# Patient Record
Sex: FEMALE | Race: OTHER | HISPANIC OR LATINO
[De-identification: names, ages, dates, MRNs, and addresses within clinical notes are randomized per-mention and may not be internally consistent; named-entity substitution may affect disease eponyms.]

---

## 2017-01-06 ENCOUNTER — APPOINTMENT (OUTPATIENT)
Age: 67
End: 2017-01-06

## 2017-01-24 ENCOUNTER — RESULT CHARGE (OUTPATIENT)
Age: 67
End: 2017-01-24

## 2017-01-25 ENCOUNTER — APPOINTMENT (OUTPATIENT)
Dept: FAMILY MEDICINE | Facility: HOSPITAL | Age: 67
End: 2017-01-25

## 2017-01-25 ENCOUNTER — OUTPATIENT (OUTPATIENT)
Dept: OUTPATIENT SERVICES | Facility: HOSPITAL | Age: 67
LOS: 1 days | End: 2017-01-25
Payer: MEDICAID

## 2017-01-25 DIAGNOSIS — R04.2 HEMOPTYSIS: ICD-10-CM

## 2017-01-25 DIAGNOSIS — Z98.51 TUBAL LIGATION STATUS: Chronic | ICD-10-CM

## 2017-01-25 PROCEDURE — G0463: CPT

## 2017-01-25 PROCEDURE — 93005 ELECTROCARDIOGRAM TRACING: CPT

## 2017-01-31 ENCOUNTER — OUTPATIENT (OUTPATIENT)
Dept: OUTPATIENT SERVICES | Facility: HOSPITAL | Age: 67
LOS: 1 days | End: 2017-01-31
Payer: MEDICAID

## 2017-01-31 DIAGNOSIS — Z98.51 TUBAL LIGATION STATUS: Chronic | ICD-10-CM

## 2017-01-31 PROCEDURE — 80053 COMPREHEN METABOLIC PANEL: CPT

## 2017-01-31 PROCEDURE — 83036 HEMOGLOBIN GLYCOSYLATED A1C: CPT

## 2017-01-31 PROCEDURE — 80061 LIPID PANEL: CPT

## 2017-01-31 PROCEDURE — 93306 TTE W/DOPPLER COMPLETE: CPT

## 2017-01-31 PROCEDURE — 85025 COMPLETE CBC W/AUTO DIFF WBC: CPT

## 2017-01-31 PROCEDURE — 85730 THROMBOPLASTIN TIME PARTIAL: CPT

## 2017-02-06 ENCOUNTER — APPOINTMENT (OUTPATIENT)
Dept: OPHTHALMOLOGY | Facility: CLINIC | Age: 67
End: 2017-02-06

## 2017-02-07 LAB — HBA1C MFR BLD HPLC: 5.9

## 2017-02-09 ENCOUNTER — APPOINTMENT (OUTPATIENT)
Dept: OPHTHALMOLOGY | Facility: CLINIC | Age: 67
End: 2017-02-09

## 2017-03-06 ENCOUNTER — APPOINTMENT (OUTPATIENT)
Dept: OPHTHALMOLOGY | Facility: CLINIC | Age: 67
End: 2017-03-06

## 2017-03-16 ENCOUNTER — FORM ENCOUNTER (OUTPATIENT)
Age: 67
End: 2017-03-16

## 2017-03-16 ENCOUNTER — APPOINTMENT (OUTPATIENT)
Dept: FAMILY MEDICINE | Facility: HOSPITAL | Age: 67
End: 2017-03-16

## 2017-03-16 ENCOUNTER — OUTPATIENT (OUTPATIENT)
Dept: OUTPATIENT SERVICES | Facility: HOSPITAL | Age: 67
LOS: 1 days | End: 2017-03-16
Payer: MEDICAID

## 2017-03-16 VITALS
SYSTOLIC BLOOD PRESSURE: 141 MMHG | HEIGHT: 55 IN | BODY MASS INDEX: 33.09 KG/M2 | HEART RATE: 63 BPM | RESPIRATION RATE: 16 BRPM | TEMPERATURE: 97.5 F | OXYGEN SATURATION: 99 % | WEIGHT: 143 LBS | DIASTOLIC BLOOD PRESSURE: 92 MMHG

## 2017-03-16 DIAGNOSIS — Z87.898 PERSONAL HISTORY OF OTHER SPECIFIED CONDITIONS: ICD-10-CM

## 2017-03-16 DIAGNOSIS — H57.9 UNSPECIFIED DISORDER OF EYE AND ADNEXA: ICD-10-CM

## 2017-03-16 DIAGNOSIS — Z87.2 PERSONAL HISTORY OF DISEASES OF THE SKIN AND SUBCUTANEOUS TISSUE: ICD-10-CM

## 2017-03-16 DIAGNOSIS — R73.03 PREDIABETES.: ICD-10-CM

## 2017-03-16 DIAGNOSIS — Z23 ENCOUNTER FOR IMMUNIZATION: ICD-10-CM

## 2017-03-16 DIAGNOSIS — Z86.79 PERSONAL HISTORY OF OTHER DISEASES OF THE CIRCULATORY SYSTEM: ICD-10-CM

## 2017-03-16 DIAGNOSIS — R05 COUGH: ICD-10-CM

## 2017-03-16 DIAGNOSIS — Z98.51 TUBAL LIGATION STATUS: Chronic | ICD-10-CM

## 2017-03-16 DIAGNOSIS — H26.9 UNSPECIFIED CATARACT: ICD-10-CM

## 2017-03-16 DIAGNOSIS — R35.8 XXOTHER POLYURIA: ICD-10-CM

## 2017-03-16 DIAGNOSIS — M79.1 MYALGIA: ICD-10-CM

## 2017-03-16 DIAGNOSIS — K21.9 GASTRO-ESOPHAGEAL REFLUX DISEASE W/OUT ESOPHAGITIS: ICD-10-CM

## 2017-03-17 PROCEDURE — 93880 EXTRACRANIAL BILAT STUDY: CPT

## 2017-03-17 PROCEDURE — G0463: CPT

## 2017-03-21 ENCOUNTER — APPOINTMENT (OUTPATIENT)
Dept: GASTROENTEROLOGY | Facility: HOSPITAL | Age: 67
End: 2017-03-21

## 2017-03-21 ENCOUNTER — APPOINTMENT (OUTPATIENT)
Age: 67
End: 2017-03-21

## 2017-03-21 ENCOUNTER — OUTPATIENT (OUTPATIENT)
Dept: OUTPATIENT SERVICES | Facility: HOSPITAL | Age: 67
LOS: 1 days | End: 2017-03-21
Payer: MEDICAID

## 2017-03-21 VITALS
HEART RATE: 75 BPM | BODY MASS INDEX: 27.88 KG/M2 | SYSTOLIC BLOOD PRESSURE: 127 MMHG | WEIGHT: 142 LBS | DIASTOLIC BLOOD PRESSURE: 82 MMHG | HEIGHT: 60 IN | RESPIRATION RATE: 14 BRPM

## 2017-03-21 DIAGNOSIS — K29.70 GASTRITIS, UNSPECIFIED, W/OUT BLEEDING: ICD-10-CM

## 2017-03-21 DIAGNOSIS — K31.9 DISEASE OF STOMACH AND DUODENUM, UNSPECIFIED: ICD-10-CM

## 2017-03-21 DIAGNOSIS — Z98.51 TUBAL LIGATION STATUS: Chronic | ICD-10-CM

## 2017-03-21 DIAGNOSIS — K29.70 GASTRITIS, UNSPECIFIED, WITHOUT BLEEDING: ICD-10-CM

## 2017-03-21 PROCEDURE — G0463: CPT

## 2017-04-06 ENCOUNTER — APPOINTMENT (OUTPATIENT)
Dept: OPHTHALMOLOGY | Facility: CLINIC | Age: 67
End: 2017-04-06

## 2017-08-18 ENCOUNTER — APPOINTMENT (OUTPATIENT)
Dept: FAMILY MEDICINE | Facility: HOSPITAL | Age: 67
End: 2017-08-18

## 2017-08-18 ENCOUNTER — RESULT CHARGE (OUTPATIENT)
Age: 67
End: 2017-08-18

## 2017-08-18 ENCOUNTER — OUTPATIENT (OUTPATIENT)
Dept: OUTPATIENT SERVICES | Facility: HOSPITAL | Age: 67
LOS: 1 days | End: 2017-08-18
Payer: MEDICAID

## 2017-08-18 ENCOUNTER — RX RENEWAL (OUTPATIENT)
Age: 67
End: 2017-08-18

## 2017-08-18 VITALS
OXYGEN SATURATION: 98 % | TEMPERATURE: 98 F | HEIGHT: 60 IN | RESPIRATION RATE: 14 BRPM | HEART RATE: 81 BPM | DIASTOLIC BLOOD PRESSURE: 107 MMHG | SYSTOLIC BLOOD PRESSURE: 159 MMHG | BODY MASS INDEX: 29.25 KG/M2 | WEIGHT: 149 LBS

## 2017-08-18 DIAGNOSIS — Z98.51 TUBAL LIGATION STATUS: Chronic | ICD-10-CM

## 2017-08-18 LAB
BILIRUB UR QL STRIP: NEGATIVE
CLARITY UR: CLEAR
COLLECTION METHOD: NORMAL
GLUCOSE UR-MCNC: NEGATIVE
HCG UR QL: 0.2 EU/DL
HGB UR QL STRIP.AUTO: NORMAL
KETONES UR-MCNC: NEGATIVE
LEUKOCYTE ESTERASE UR QL STRIP: NORMAL
NITRITE UR QL STRIP: NEGATIVE
PH UR STRIP: 6.5
PROT UR STRIP-MCNC: NORMAL
SP GR UR STRIP: 1.01

## 2017-08-18 PROCEDURE — 87086 URINE CULTURE/COLONY COUNT: CPT

## 2017-08-18 PROCEDURE — G0463: CPT

## 2017-11-10 ENCOUNTER — MED ADMIN CHARGE (OUTPATIENT)
Age: 67
End: 2017-11-10

## 2017-11-10 ENCOUNTER — OUTPATIENT (OUTPATIENT)
Dept: OUTPATIENT SERVICES | Facility: HOSPITAL | Age: 67
LOS: 1 days | End: 2017-11-10
Payer: MEDICAID

## 2017-11-10 ENCOUNTER — APPOINTMENT (OUTPATIENT)
Dept: FAMILY MEDICINE | Facility: HOSPITAL | Age: 67
End: 2017-11-10

## 2017-11-10 VITALS
WEIGHT: 146 LBS | HEIGHT: 60 IN | HEART RATE: 72 BPM | DIASTOLIC BLOOD PRESSURE: 105 MMHG | RESPIRATION RATE: 14 BRPM | SYSTOLIC BLOOD PRESSURE: 149 MMHG | TEMPERATURE: 98.1 F | BODY MASS INDEX: 28.66 KG/M2

## 2017-11-10 VITALS — SYSTOLIC BLOOD PRESSURE: 140 MMHG | DIASTOLIC BLOOD PRESSURE: 94 MMHG

## 2017-11-10 DIAGNOSIS — Z98.51 TUBAL LIGATION STATUS: Chronic | ICD-10-CM

## 2017-11-10 DIAGNOSIS — I10 ESSENTIAL (PRIMARY) HYPERTENSION: ICD-10-CM

## 2017-11-10 DIAGNOSIS — H26.9 UNSPECIFIED CATARACT: ICD-10-CM

## 2017-11-10 DIAGNOSIS — Z87.898 PERSONAL HISTORY OF OTHER SPECIFIED CONDITIONS: ICD-10-CM

## 2017-11-10 DIAGNOSIS — M25.511 PAIN IN RIGHT SHOULDER: ICD-10-CM

## 2017-11-10 DIAGNOSIS — R10.2 PELVIC AND PERINEAL PAIN: ICD-10-CM

## 2017-11-10 DIAGNOSIS — H34.212 PARTIAL RETINAL ARTERY OCCLUSION, LEFT EYE: ICD-10-CM

## 2017-11-10 DIAGNOSIS — H35.039 HYPERTENSIVE RETINOPATHY, UNSPECIFIED EYE: ICD-10-CM

## 2017-11-10 DIAGNOSIS — M25.512 PAIN IN RIGHT SHOULDER: ICD-10-CM

## 2017-11-10 DIAGNOSIS — Z00.00 ENCOUNTER FOR GENERAL ADULT MEDICAL EXAMINATION W/OUT ABNORMAL FINDINGS: ICD-10-CM

## 2017-11-10 PROCEDURE — 83036 HEMOGLOBIN GLYCOSYLATED A1C: CPT

## 2017-11-10 PROCEDURE — G0463: CPT

## 2017-11-10 PROCEDURE — 82306 VITAMIN D 25 HYDROXY: CPT

## 2017-11-10 PROCEDURE — 36415 COLL VENOUS BLD VENIPUNCTURE: CPT

## 2017-11-10 PROCEDURE — 80053 COMPREHEN METABOLIC PANEL: CPT

## 2017-11-10 PROCEDURE — 80061 LIPID PANEL: CPT

## 2017-11-10 PROCEDURE — 85025 COMPLETE CBC W/AUTO DIFF WBC: CPT

## 2017-11-10 RX ORDER — LOSARTAN POTASSIUM 100 MG/1
100 TABLET, FILM COATED ORAL DAILY
Qty: 90 | Refills: 0 | Status: DISCONTINUED | COMMUNITY
Start: 2017-03-16 | End: 2017-11-10

## 2017-11-10 RX ORDER — SULFAMETHOXAZOLE AND TRIMETHOPRIM 800; 160 MG/1; MG/1
800-160 TABLET ORAL TWICE DAILY
Qty: 8 | Refills: 0 | Status: DISCONTINUED | COMMUNITY
Start: 2017-08-18 | End: 2017-11-10

## 2017-11-10 RX ORDER — NITROFURANTOIN MACROCRYSTALS 100 MG/1
100 CAPSULE ORAL
Qty: 10 | Refills: 0 | Status: DISCONTINUED | COMMUNITY
Start: 2017-08-18 | End: 2017-11-10

## 2017-11-17 LAB — HBA1C MFR BLD HPLC: 5.6

## 2017-12-12 ENCOUNTER — RX RENEWAL (OUTPATIENT)
Age: 67
End: 2017-12-12

## 2018-01-22 ENCOUNTER — RX RENEWAL (OUTPATIENT)
Age: 68
End: 2018-01-22

## 2018-02-05 ENCOUNTER — MEDICATION RENEWAL (OUTPATIENT)
Age: 68
End: 2018-02-05

## 2018-02-07 ENCOUNTER — RX RENEWAL (OUTPATIENT)
Age: 68
End: 2018-02-07

## 2018-02-09 ENCOUNTER — APPOINTMENT (OUTPATIENT)
Dept: FAMILY MEDICINE | Facility: HOSPITAL | Age: 68
End: 2018-02-09

## 2018-02-09 ENCOUNTER — OUTPATIENT (OUTPATIENT)
Dept: OUTPATIENT SERVICES | Facility: HOSPITAL | Age: 68
LOS: 1 days | End: 2018-02-09
Payer: MEDICAID

## 2018-02-09 VITALS
DIASTOLIC BLOOD PRESSURE: 92 MMHG | SYSTOLIC BLOOD PRESSURE: 161 MMHG | TEMPERATURE: 98 F | HEART RATE: 72 BPM | OXYGEN SATURATION: 98 % | BODY MASS INDEX: 29.3 KG/M2 | WEIGHT: 150 LBS | RESPIRATION RATE: 14 BRPM

## 2018-02-09 DIAGNOSIS — Z98.51 TUBAL LIGATION STATUS: Chronic | ICD-10-CM

## 2018-02-09 DIAGNOSIS — Z00.00 ENCOUNTER FOR GENERAL ADULT MEDICAL EXAMINATION WITHOUT ABNORMAL FINDINGS: ICD-10-CM

## 2018-02-09 DIAGNOSIS — M79.1 MYALGIA: ICD-10-CM

## 2018-02-09 PROCEDURE — 82565 ASSAY OF CREATININE: CPT

## 2018-02-09 PROCEDURE — 80076 HEPATIC FUNCTION PANEL: CPT

## 2018-02-09 PROCEDURE — G0463: CPT

## 2018-02-09 RX ORDER — ONDANSETRON 4 MG/1
4 TABLET ORAL 3 TIMES DAILY
Qty: 30 | Refills: 0 | Status: DISCONTINUED | COMMUNITY
Start: 2017-11-10 | End: 2018-02-09

## 2018-02-09 RX ORDER — RANITIDINE 150 MG/1
150 TABLET ORAL
Qty: 60 | Refills: 0 | Status: ACTIVE | COMMUNITY
Start: 2017-01-25 | End: 1900-01-01

## 2018-02-09 RX ORDER — NAPROXEN 500 MG/1
500 TABLET, DELAYED RELEASE ORAL
Qty: 10 | Refills: 0 | Status: DISCONTINUED | COMMUNITY
Start: 2017-11-10 | End: 2018-02-09

## 2018-02-09 RX ORDER — ATORVASTATIN CALCIUM 20 MG/1
20 TABLET, FILM COATED ORAL
Qty: 30 | Refills: 0 | Status: DISCONTINUED | COMMUNITY
Start: 2017-09-17

## 2018-02-12 DIAGNOSIS — M79.1 MYALGIA: ICD-10-CM

## 2018-03-06 ENCOUNTER — APPOINTMENT (OUTPATIENT)
Dept: FAMILY MEDICINE | Facility: HOSPITAL | Age: 68
End: 2018-03-06

## 2018-03-06 ENCOUNTER — OUTPATIENT (OUTPATIENT)
Dept: OUTPATIENT SERVICES | Facility: HOSPITAL | Age: 68
LOS: 1 days | End: 2018-03-06
Payer: MEDICARE

## 2018-03-06 ENCOUNTER — EMERGENCY (EMERGENCY)
Facility: HOSPITAL | Age: 68
LOS: 1 days | Discharge: HOME | End: 2018-03-06
Admitting: INTERNAL MEDICINE
Payer: MEDICARE

## 2018-03-06 DIAGNOSIS — Z98.51 TUBAL LIGATION STATUS: Chronic | ICD-10-CM

## 2018-03-06 DIAGNOSIS — Z00.00 ENCOUNTER FOR GENERAL ADULT MEDICAL EXAMINATION WITHOUT ABNORMAL FINDINGS: ICD-10-CM

## 2018-03-06 DIAGNOSIS — R11.0 NAUSEA: ICD-10-CM

## 2018-03-06 PROCEDURE — 93005 ELECTROCARDIOGRAM TRACING: CPT

## 2018-03-06 PROCEDURE — 36415 COLL VENOUS BLD VENIPUNCTURE: CPT

## 2018-03-06 PROCEDURE — 85610 PROTHROMBIN TIME: CPT

## 2018-03-06 PROCEDURE — 82150 ASSAY OF AMYLASE: CPT

## 2018-03-06 PROCEDURE — G0463: CPT

## 2018-03-06 PROCEDURE — 84484 ASSAY OF TROPONIN QUANT: CPT

## 2018-03-06 PROCEDURE — 82550 ASSAY OF CK (CPK): CPT

## 2018-03-06 PROCEDURE — 83880 ASSAY OF NATRIURETIC PEPTIDE: CPT

## 2018-03-06 PROCEDURE — 80076 HEPATIC FUNCTION PANEL: CPT

## 2018-03-06 PROCEDURE — 80048 BASIC METABOLIC PNL TOTAL CA: CPT

## 2018-03-06 PROCEDURE — 93010 ELECTROCARDIOGRAM REPORT: CPT

## 2018-03-06 PROCEDURE — 85027 COMPLETE CBC AUTOMATED: CPT

## 2018-03-06 PROCEDURE — 99284 EMERGENCY DEPT VISIT MOD MDM: CPT | Mod: 25

## 2018-03-06 PROCEDURE — 82553 CREATINE MB FRACTION: CPT

## 2018-03-06 PROCEDURE — 99284 EMERGENCY DEPT VISIT MOD MDM: CPT

## 2018-03-06 PROCEDURE — 83690 ASSAY OF LIPASE: CPT

## 2018-03-06 PROCEDURE — 85730 THROMBOPLASTIN TIME PARTIAL: CPT

## 2018-03-06 RX ORDER — MULTIVITAMIN
TABLET ORAL DAILY
Qty: 3 | Refills: 3 | Status: ACTIVE | COMMUNITY
Start: 2018-03-06 | End: 1900-01-01

## 2018-03-13 DIAGNOSIS — R11.0 NAUSEA: ICD-10-CM

## 2018-03-23 ENCOUNTER — APPOINTMENT (OUTPATIENT)
Dept: FAMILY MEDICINE | Facility: HOSPITAL | Age: 68
End: 2018-03-23

## 2018-03-23 ENCOUNTER — OUTPATIENT (OUTPATIENT)
Dept: OUTPATIENT SERVICES | Facility: HOSPITAL | Age: 68
LOS: 1 days | End: 2018-03-23
Payer: MEDICARE

## 2018-03-23 VITALS
RESPIRATION RATE: 16 BRPM | WEIGHT: 139 LBS | OXYGEN SATURATION: 98 % | HEART RATE: 79 BPM | TEMPERATURE: 98 F | SYSTOLIC BLOOD PRESSURE: 126 MMHG | DIASTOLIC BLOOD PRESSURE: 82 MMHG | BODY MASS INDEX: 27.15 KG/M2

## 2018-03-23 DIAGNOSIS — Z92.29 PERSONAL HISTORY OF OTHER DRUG THERAPY: ICD-10-CM

## 2018-03-23 DIAGNOSIS — Z00.00 ENCOUNTER FOR GENERAL ADULT MEDICAL EXAMINATION WITHOUT ABNORMAL FINDINGS: ICD-10-CM

## 2018-03-23 DIAGNOSIS — F32.9 MAJOR DEPRESSIVE DISORDER, SINGLE EPISODE, UNSPECIFIED: ICD-10-CM

## 2018-03-23 DIAGNOSIS — Z12.31 ENCOUNTER FOR SCREENING MAMMOGRAM FOR MALIGNANT NEOPLASM OF BREAST: ICD-10-CM

## 2018-03-23 DIAGNOSIS — Z98.51 TUBAL LIGATION STATUS: Chronic | ICD-10-CM

## 2018-03-23 DIAGNOSIS — N85.00 ENDOMETRIAL HYPERPLASIA, UNSPECIFIED: ICD-10-CM

## 2018-03-23 DIAGNOSIS — G47.9 SLEEP DISORDER, UNSPECIFIED: ICD-10-CM

## 2018-03-23 PROCEDURE — G0463: CPT

## 2018-03-23 RX ORDER — SERTRALINE HYDROCHLORIDE 50 MG/1
50 TABLET, FILM COATED ORAL DAILY
Qty: 1 | Refills: 1 | Status: ACTIVE | COMMUNITY
Start: 2018-03-06 | End: 1900-01-01

## 2018-03-23 RX ORDER — LOSARTAN POTASSIUM AND HYDROCHLOROTHIAZIDE 25; 100 MG/1; MG/1
100-25 TABLET ORAL DAILY
Qty: 90 | Refills: 0 | Status: ACTIVE | COMMUNITY
Start: 2017-11-10 | End: 1900-01-01

## 2018-03-23 RX ORDER — ONDANSETRON 4 MG/1
4 TABLET ORAL 3 TIMES DAILY
Qty: 21 | Refills: 0 | Status: DISCONTINUED | COMMUNITY
Start: 2018-03-06 | End: 2018-03-23

## 2018-03-25 ENCOUNTER — EMERGENCY (EMERGENCY)
Facility: HOSPITAL | Age: 68
LOS: 1 days | Discharge: HOME | End: 2018-03-25
Attending: EMERGENCY MEDICINE
Payer: MEDICARE

## 2018-03-25 VITALS
HEART RATE: 66 BPM | TEMPERATURE: 100 F | DIASTOLIC BLOOD PRESSURE: 66 MMHG | OXYGEN SATURATION: 98 % | HEIGHT: 61 IN | SYSTOLIC BLOOD PRESSURE: 98 MMHG | RESPIRATION RATE: 18 BRPM | WEIGHT: 145.95 LBS

## 2018-03-25 VITALS
OXYGEN SATURATION: 100 % | DIASTOLIC BLOOD PRESSURE: 57 MMHG | RESPIRATION RATE: 18 BRPM | SYSTOLIC BLOOD PRESSURE: 92 MMHG | HEART RATE: 83 BPM | TEMPERATURE: 98 F

## 2018-03-25 DIAGNOSIS — Z98.51 TUBAL LIGATION STATUS: Chronic | ICD-10-CM

## 2018-03-25 LAB
ALBUMIN SERPL ELPH-MCNC: 2.7 G/DL — LOW (ref 3.5–5)
ALP SERPL-CCNC: 73 U/L — SIGNIFICANT CHANGE UP (ref 40–120)
ALT FLD-CCNC: 24 U/L DA — SIGNIFICANT CHANGE UP (ref 10–60)
ANION GAP SERPL CALC-SCNC: 10 MMOL/L — SIGNIFICANT CHANGE UP (ref 5–17)
APPEARANCE UR: CLEAR — SIGNIFICANT CHANGE UP
AST SERPL-CCNC: 26 U/L — SIGNIFICANT CHANGE UP (ref 10–40)
BASOPHILS # BLD AUTO: 0 K/UL — SIGNIFICANT CHANGE UP (ref 0–0.2)
BASOPHILS NFR BLD AUTO: 0.2 % — SIGNIFICANT CHANGE UP (ref 0–2)
BILIRUB SERPL-MCNC: 0.7 MG/DL — SIGNIFICANT CHANGE UP (ref 0.2–1.2)
BILIRUB UR-MCNC: NEGATIVE — SIGNIFICANT CHANGE UP
BUN SERPL-MCNC: 13 MG/DL — SIGNIFICANT CHANGE UP (ref 7–18)
CALCIUM SERPL-MCNC: 8.1 MG/DL — LOW (ref 8.4–10.5)
CHLORIDE SERPL-SCNC: 85 MMOL/L — LOW (ref 96–108)
CO2 SERPL-SCNC: 26 MMOL/L — SIGNIFICANT CHANGE UP (ref 22–31)
COLOR SPEC: YELLOW — SIGNIFICANT CHANGE UP
CREAT SERPL-MCNC: 0.94 MG/DL — SIGNIFICANT CHANGE UP (ref 0.5–1.3)
DIFF PNL FLD: ABNORMAL
EOSINOPHIL # BLD AUTO: 0 K/UL — SIGNIFICANT CHANGE UP (ref 0–0.5)
EOSINOPHIL NFR BLD AUTO: 0.1 % — SIGNIFICANT CHANGE UP (ref 0–6)
GLUCOSE SERPL-MCNC: 178 MG/DL — HIGH (ref 70–99)
GLUCOSE UR QL: NEGATIVE — SIGNIFICANT CHANGE UP
HCT VFR BLD CALC: 31.5 % — LOW (ref 34.5–45)
HGB BLD-MCNC: 11.1 G/DL — LOW (ref 11.5–15.5)
KETONES UR-MCNC: NEGATIVE — SIGNIFICANT CHANGE UP
LEUKOCYTE ESTERASE UR-ACNC: NEGATIVE — SIGNIFICANT CHANGE UP
LYMPHOCYTES # BLD AUTO: 0.4 K/UL — LOW (ref 1–3.3)
LYMPHOCYTES # BLD AUTO: 2.8 % — LOW (ref 13–44)
MCHC RBC-ENTMCNC: 31.5 PG — SIGNIFICANT CHANGE UP (ref 27–34)
MCHC RBC-ENTMCNC: 35.2 GM/DL — SIGNIFICANT CHANGE UP (ref 32–36)
MCV RBC AUTO: 89.4 FL — SIGNIFICANT CHANGE UP (ref 80–100)
MONOCYTES # BLD AUTO: 0.3 K/UL — SIGNIFICANT CHANGE UP (ref 0–0.9)
MONOCYTES NFR BLD AUTO: 2.3 % — SIGNIFICANT CHANGE UP (ref 2–14)
NEUTROPHILS # BLD AUTO: 12.7 K/UL — HIGH (ref 1.8–7.4)
NEUTROPHILS NFR BLD AUTO: 94.7 % — HIGH (ref 43–77)
NITRITE UR-MCNC: NEGATIVE — SIGNIFICANT CHANGE UP
PH UR: 8 — SIGNIFICANT CHANGE UP (ref 5–8)
PLATELET # BLD AUTO: 201 K/UL — SIGNIFICANT CHANGE UP (ref 150–400)
POTASSIUM SERPL-MCNC: 3.2 MMOL/L — LOW (ref 3.5–5.3)
POTASSIUM SERPL-SCNC: 3.2 MMOL/L — LOW (ref 3.5–5.3)
PROT SERPL-MCNC: 6.6 G/DL — SIGNIFICANT CHANGE UP (ref 6–8.3)
PROT UR-MCNC: 15
RBC # BLD: 3.52 M/UL — LOW (ref 3.8–5.2)
RBC # FLD: 11 % — SIGNIFICANT CHANGE UP (ref 10.3–14.5)
SODIUM SERPL-SCNC: 121 MMOL/L — LOW (ref 135–145)
SP GR SPEC: 1.01 — SIGNIFICANT CHANGE UP (ref 1.01–1.02)
UROBILINOGEN FLD QL: NEGATIVE — SIGNIFICANT CHANGE UP
WBC # BLD: 13.4 K/UL — HIGH (ref 3.8–10.5)
WBC # FLD AUTO: 13.4 K/UL — HIGH (ref 3.8–10.5)

## 2018-03-25 PROCEDURE — 72131 CT LUMBAR SPINE W/O DYE: CPT

## 2018-03-25 PROCEDURE — 93005 ELECTROCARDIOGRAM TRACING: CPT

## 2018-03-25 PROCEDURE — 82962 GLUCOSE BLOOD TEST: CPT

## 2018-03-25 PROCEDURE — 99284 EMERGENCY DEPT VISIT MOD MDM: CPT | Mod: 25

## 2018-03-25 PROCEDURE — 80053 COMPREHEN METABOLIC PANEL: CPT

## 2018-03-25 PROCEDURE — 81001 URINALYSIS AUTO W/SCOPE: CPT

## 2018-03-25 PROCEDURE — 87086 URINE CULTURE/COLONY COUNT: CPT

## 2018-03-25 PROCEDURE — 99285 EMERGENCY DEPT VISIT HI MDM: CPT

## 2018-03-25 PROCEDURE — 72131 CT LUMBAR SPINE W/O DYE: CPT | Mod: 26

## 2018-03-25 PROCEDURE — 96374 THER/PROPH/DIAG INJ IV PUSH: CPT

## 2018-03-25 PROCEDURE — 85027 COMPLETE CBC AUTOMATED: CPT

## 2018-03-25 RX ORDER — KETOROLAC TROMETHAMINE 30 MG/ML
15 SYRINGE (ML) INJECTION ONCE
Qty: 0 | Refills: 0 | Status: DISCONTINUED | OUTPATIENT
Start: 2018-03-25 | End: 2018-03-25

## 2018-03-25 RX ADMIN — Medication 15 MILLIGRAM(S): at 17:09

## 2018-03-25 RX ADMIN — Medication 15 MILLIGRAM(S): at 17:45

## 2018-03-25 NOTE — ED PROVIDER NOTE - CHPI ED SYMPTOMS NEG
no tingling/no bowel dysfunction/no bladder dysfunction/no constipation/no motor function loss/no anorexia/no neck tenderness/no numbness/no fatigue

## 2018-03-25 NOTE — ED ADULT NURSE REASSESSMENT NOTE - NS ED NURSE REASSESS COMMENT FT1
1510 Received pt sleeping easil;y arousable , pt's daughter at bedside no complaints awaitng reevaluation

## 2018-03-25 NOTE — ED ADULT NURSE NOTE - OBJECTIVE STATEMENT
Pt requested adult grandson to translate, states she has generalized weakness x 7-10 days, chills on and off, lower back pain and fell x 8 days ago, denies injuries, ambulates without difficulty

## 2018-03-25 NOTE — ED PROVIDER NOTE - OBJECTIVE STATEMENT
lower back pain from many months  had fever this week at home to 100.1  no dysuria no frequency no n/v/d/  no cp no sob   no rash no injury/trauma  no weakness in lower ext no numbness no tingling

## 2018-03-26 DIAGNOSIS — I10 ESSENTIAL (PRIMARY) HYPERTENSION: ICD-10-CM

## 2018-03-26 DIAGNOSIS — Z12.31 ENCOUNTER FOR SCREENING MAMMOGRAM FOR MALIGNANT NEOPLASM OF BREAST: ICD-10-CM

## 2018-03-26 LAB
CULTURE RESULTS: NO GROWTH — SIGNIFICANT CHANGE UP
SPECIMEN SOURCE: SIGNIFICANT CHANGE UP

## 2018-03-29 ENCOUNTER — APPOINTMENT (OUTPATIENT)
Dept: MAMMOGRAPHY | Facility: HOSPITAL | Age: 68
End: 2018-03-29

## 2018-04-04 ENCOUNTER — INPATIENT (INPATIENT)
Facility: HOSPITAL | Age: 68
LOS: 3 days | Discharge: ANOTHER HOSPITAL | DRG: 305 | End: 2018-04-08
Attending: SURGERY | Admitting: SURGERY
Payer: MEDICARE

## 2018-04-04 VITALS
RESPIRATION RATE: 18 BRPM | HEIGHT: 62 IN | TEMPERATURE: 98 F | OXYGEN SATURATION: 92 % | SYSTOLIC BLOOD PRESSURE: 110 MMHG | HEART RATE: 92 BPM | WEIGHT: 138.01 LBS | DIASTOLIC BLOOD PRESSURE: 69 MMHG

## 2018-04-04 DIAGNOSIS — E87.1 HYPO-OSMOLALITY AND HYPONATREMIA: ICD-10-CM

## 2018-04-04 DIAGNOSIS — I10 ESSENTIAL (PRIMARY) HYPERTENSION: ICD-10-CM

## 2018-04-04 DIAGNOSIS — E78.5 HYPERLIPIDEMIA, UNSPECIFIED: ICD-10-CM

## 2018-04-04 DIAGNOSIS — E11.9 TYPE 2 DIABETES MELLITUS WITHOUT COMPLICATIONS: ICD-10-CM

## 2018-04-04 DIAGNOSIS — Z98.51 TUBAL LIGATION STATUS: Chronic | ICD-10-CM

## 2018-04-04 DIAGNOSIS — Z29.9 ENCOUNTER FOR PROPHYLACTIC MEASURES, UNSPECIFIED: ICD-10-CM

## 2018-04-04 DIAGNOSIS — D72.825 BANDEMIA: ICD-10-CM

## 2018-04-04 DIAGNOSIS — M54.9 DORSALGIA, UNSPECIFIED: ICD-10-CM

## 2018-04-04 LAB
ALBUMIN SERPL ELPH-MCNC: 1.8 G/DL — LOW (ref 3.5–5)
ALP SERPL-CCNC: 167 U/L — HIGH (ref 40–120)
ALT FLD-CCNC: 41 U/L DA — SIGNIFICANT CHANGE UP (ref 10–60)
ANION GAP SERPL CALC-SCNC: 8 MMOL/L — SIGNIFICANT CHANGE UP (ref 5–17)
APPEARANCE UR: CLEAR — SIGNIFICANT CHANGE UP
AST SERPL-CCNC: 29 U/L — SIGNIFICANT CHANGE UP (ref 10–40)
BASOPHILS # BLD AUTO: 0.2 K/UL — SIGNIFICANT CHANGE UP (ref 0–0.2)
BASOPHILS NFR BLD AUTO: 0.6 % — SIGNIFICANT CHANGE UP (ref 0–2)
BASOPHILS NFR BLD AUTO: 1 % — SIGNIFICANT CHANGE UP (ref 0–2)
BILIRUB SERPL-MCNC: 0.6 MG/DL — SIGNIFICANT CHANGE UP (ref 0.2–1.2)
BILIRUB UR-MCNC: NEGATIVE — SIGNIFICANT CHANGE UP
BUN SERPL-MCNC: 24 MG/DL — HIGH (ref 7–18)
CALCIUM SERPL-MCNC: 7.9 MG/DL — LOW (ref 8.4–10.5)
CHLORIDE SERPL-SCNC: 93 MMOL/L — LOW (ref 96–108)
CO2 SERPL-SCNC: 25 MMOL/L — SIGNIFICANT CHANGE UP (ref 22–31)
COLOR SPEC: YELLOW — SIGNIFICANT CHANGE UP
CREAT SERPL-MCNC: 0.86 MG/DL — SIGNIFICANT CHANGE UP (ref 0.5–1.3)
DIFF PNL FLD: ABNORMAL
EOSINOPHIL # BLD AUTO: 0 K/UL — SIGNIFICANT CHANGE UP (ref 0–0.5)
EOSINOPHIL NFR BLD AUTO: 0.1 % — SIGNIFICANT CHANGE UP (ref 0–6)
ERYTHROCYTE [SEDIMENTATION RATE] IN BLOOD: 95 MM/HR — HIGH (ref 0–20)
GLUCOSE SERPL-MCNC: 245 MG/DL — HIGH (ref 70–99)
GLUCOSE UR QL: NEGATIVE — SIGNIFICANT CHANGE UP
HCT VFR BLD CALC: 22.1 % — LOW (ref 34.5–45)
HCT VFR BLD CALC: 23.2 % — LOW (ref 34.5–45)
HGB BLD-MCNC: 7.1 G/DL — LOW (ref 11.5–15.5)
HGB BLD-MCNC: 7.4 G/DL — LOW (ref 11.5–15.5)
IRON SATN MFR SERPL: 16 UG/DL — LOW (ref 40–150)
IRON SATN MFR SERPL: 8 % — LOW (ref 15–50)
KETONES UR-MCNC: NEGATIVE — SIGNIFICANT CHANGE UP
LEUKOCYTE ESTERASE UR-ACNC: ABNORMAL
LIDOCAIN IGE QN: 176 U/L — SIGNIFICANT CHANGE UP (ref 73–393)
LYMPHOCYTES # BLD AUTO: 1.7 K/UL — SIGNIFICANT CHANGE UP (ref 1–3.3)
LYMPHOCYTES # BLD AUTO: 5 % — LOW (ref 13–44)
LYMPHOCYTES # BLD AUTO: 5.8 % — LOW (ref 13–44)
MCHC RBC-ENTMCNC: 29.5 PG — SIGNIFICANT CHANGE UP (ref 27–34)
MCHC RBC-ENTMCNC: 29.7 PG — SIGNIFICANT CHANGE UP (ref 27–34)
MCHC RBC-ENTMCNC: 31.7 GM/DL — LOW (ref 32–36)
MCHC RBC-ENTMCNC: 32.2 GM/DL — SIGNIFICANT CHANGE UP (ref 32–36)
MCV RBC AUTO: 92 FL — SIGNIFICANT CHANGE UP (ref 80–100)
MCV RBC AUTO: 93 FL — SIGNIFICANT CHANGE UP (ref 80–100)
MONOCYTES # BLD AUTO: 2.1 K/UL — HIGH (ref 0–0.9)
MONOCYTES NFR BLD AUTO: 7 % — SIGNIFICANT CHANGE UP (ref 2–14)
MONOCYTES NFR BLD AUTO: 7.5 % — SIGNIFICANT CHANGE UP (ref 2–14)
NEUTROPHILS # BLD AUTO: 24.7 K/UL — HIGH (ref 1.8–7.4)
NEUTROPHILS NFR BLD AUTO: 82 % — HIGH (ref 43–77)
NEUTROPHILS NFR BLD AUTO: 86 % — HIGH (ref 43–77)
NITRITE UR-MCNC: NEGATIVE — SIGNIFICANT CHANGE UP
OSMOLALITY SERPL: 270 MOS/KG — LOW (ref 275–300)
PH UR: 6.5 — SIGNIFICANT CHANGE UP (ref 5–8)
PLATELET # BLD AUTO: 260 K/UL — SIGNIFICANT CHANGE UP (ref 150–400)
PLATELET # BLD AUTO: 268 K/UL — SIGNIFICANT CHANGE UP (ref 150–400)
POTASSIUM SERPL-MCNC: 4.8 MMOL/L — SIGNIFICANT CHANGE UP (ref 3.5–5.3)
POTASSIUM SERPL-SCNC: 4.8 MMOL/L — SIGNIFICANT CHANGE UP (ref 3.5–5.3)
PROT SERPL-MCNC: 6.3 G/DL — SIGNIFICANT CHANGE UP (ref 6–8.3)
PROT UR-MCNC: NEGATIVE — SIGNIFICANT CHANGE UP
RBC # BLD: 2.4 M/UL — LOW (ref 3.8–5.2)
RBC # BLD: 2.49 M/UL — LOW (ref 3.8–5.2)
RBC # FLD: 12.8 % — SIGNIFICANT CHANGE UP (ref 10.3–14.5)
RBC # FLD: 12.9 % — SIGNIFICANT CHANGE UP (ref 10.3–14.5)
SODIUM SERPL-SCNC: 126 MMOL/L — LOW (ref 135–145)
SP GR SPEC: 1.01 — SIGNIFICANT CHANGE UP (ref 1.01–1.02)
TIBC SERPL-MCNC: 197 UG/DL — LOW (ref 250–450)
UIBC SERPL-MCNC: 181 UG/DL — SIGNIFICANT CHANGE UP (ref 110–370)
URATE SERPL-MCNC: 3.4 MG/DL — SIGNIFICANT CHANGE UP (ref 2.5–7)
UROBILINOGEN FLD QL: NEGATIVE — SIGNIFICANT CHANGE UP
WBC # BLD: 23.4 K/UL — HIGH (ref 3.8–10.5)
WBC # BLD: 28.7 K/UL — HIGH (ref 3.8–10.5)
WBC # FLD AUTO: 23.4 K/UL — HIGH (ref 3.8–10.5)
WBC # FLD AUTO: 28.7 K/UL — HIGH (ref 3.8–10.5)

## 2018-04-04 PROCEDURE — 71045 X-RAY EXAM CHEST 1 VIEW: CPT | Mod: 26

## 2018-04-04 PROCEDURE — 99285 EMERGENCY DEPT VISIT HI MDM: CPT

## 2018-04-04 RX ORDER — ATORVASTATIN CALCIUM 80 MG/1
2 TABLET, FILM COATED ORAL
Qty: 0 | Refills: 0 | COMMUNITY

## 2018-04-04 RX ORDER — TRAMADOL HYDROCHLORIDE 50 MG/1
50 TABLET ORAL EVERY 8 HOURS
Qty: 0 | Refills: 0 | Status: DISCONTINUED | OUTPATIENT
Start: 2018-04-04 | End: 2018-04-06

## 2018-04-04 RX ORDER — LOSARTAN POTASSIUM 100 MG/1
100 TABLET, FILM COATED ORAL DAILY
Qty: 0 | Refills: 0 | Status: DISCONTINUED | OUTPATIENT
Start: 2018-04-04 | End: 2018-04-05

## 2018-04-04 RX ORDER — VANCOMYCIN HCL 1 G
1000 VIAL (EA) INTRAVENOUS ONCE
Qty: 0 | Refills: 0 | Status: COMPLETED | OUTPATIENT
Start: 2018-04-04 | End: 2018-04-05

## 2018-04-04 RX ORDER — ATORVASTATIN CALCIUM 80 MG/1
40 TABLET, FILM COATED ORAL AT BEDTIME
Qty: 0 | Refills: 0 | Status: DISCONTINUED | OUTPATIENT
Start: 2018-04-04 | End: 2018-04-06

## 2018-04-04 RX ORDER — SODIUM CHLORIDE 9 MG/ML
1000 INJECTION, SOLUTION INTRAVENOUS
Qty: 0 | Refills: 0 | Status: DISCONTINUED | OUTPATIENT
Start: 2018-04-04 | End: 2018-04-05

## 2018-04-04 RX ORDER — PIPERACILLIN AND TAZOBACTAM 4; .5 G/20ML; G/20ML
3.38 INJECTION, POWDER, LYOPHILIZED, FOR SOLUTION INTRAVENOUS ONCE
Qty: 0 | Refills: 0 | Status: COMPLETED | OUTPATIENT
Start: 2018-04-04 | End: 2018-04-05

## 2018-04-04 RX ORDER — ENOXAPARIN SODIUM 100 MG/ML
40 INJECTION SUBCUTANEOUS DAILY
Qty: 0 | Refills: 0 | Status: DISCONTINUED | OUTPATIENT
Start: 2018-04-04 | End: 2018-04-04

## 2018-04-04 RX ADMIN — TRAMADOL HYDROCHLORIDE 50 MILLIGRAM(S): 50 TABLET ORAL at 23:15

## 2018-04-04 NOTE — ED ADULT NURSE NOTE - OBJECTIVE STATEMENT
pt a&ox3, sent by PMD for abnormal labs. as per family, pt has low hemoglobin and elevated blood sugar. denies CP, n/v, fever, chills.

## 2018-04-04 NOTE — H&P ADULT - NSHPLABSRESULTS_GEN_ALL_CORE
WBC count elevated at 23.4  H/H dropped to 7.4 from 11.1  Sodium low at 126 but improved  Other lytes are mildly low except for K  Cr wnl  Glucose elevated  Very high protein/albumin ratio  ALP high  UA neg  CXR has possible finding in left costophrenic angle

## 2018-04-04 NOTE — ED ADULT NURSE NOTE - ED STAT RN HANDOFF DETAILS
endorsed to RN Shante in B2 in stable condition for continuation of care. pt a&ox3, admitted for bandemia. 20G RAC.

## 2018-04-04 NOTE — H&P ADULT - HISTORY OF PRESENT ILLNESS
68F from home PMH HTN, DM, HLD who comes to hospital for 2 weeks of back pain. Daughter says that she has had longstanding back pain but in the past 2 weeks, it has worsened significantly. She is unable to walk, the pain is very severe, she feels dizzy, weakness in the bilateral LE, says she cannot balance well. She came to ED last week, had temperature of 100.1, and was discharged from the ED with outpatient follow up. Today she saw her regular doctor who noted all her complaints and told her to come to the hospital. Patient also complains of night sweats, subjective fevers at night. Denies any cough, sob, nausea, vomiting, diarrhea, abd pain. Moved here from  8 years ago. Denies any bowel or bladder incontinence.

## 2018-04-04 NOTE — H&P ADULT - NSHPREVIEWOFSYSTEMS_GEN_ALL_CORE
REVIEW OF SYSTEMS:  CONSTITUTIONAL: night sweats, subjective fever at night primarily  EYES: No eye pain, visual disturbances, or discharge  ENMT:  No difficulty hearing, tinnitus, vertigo; No sinus or throat pain  NECK: No pain or stiffness  RESPIRATORY: No cough, wheezing, chills or hemoptysis; No shortness of breath  CARDIOVASCULAR: No chest pain, palpitations, dizziness, or leg swelling  GASTROINTESTINAL: No abdominal or epigastric pain. No nausea, vomiting, or hematemesis; No diarrhea or constipation. No melena or hematochezia.  GENITOURINARY: No dysuria, frequency, hematuria, or incontinence  NEUROLOGICAL: No headaches, memory loss, loss of strength, numbness, or tremors  SKIN: No itching, burning, rashes, or lesions   LYMPH NODES: No enlarged glands  ENDOCRINE: No heat or cold intolerance; No hair loss  MUSCULOSKELETAL: No joint pain or swelling; No muscle, back, or extremity pain  PSYCHIATRIC: No depression, anxiety, mood swings, or difficulty sleeping  HEME/LYMPH: No easy bruising, or bleeding gums  ALLERY AND IMMUNOLOGIC: No hives or eczema

## 2018-04-04 NOTE — H&P ADULT - ATTENDING COMMENTS
Patient seen/evaluated at bedside 4/4/2018. I agree with the resident progress note/outlined plan of care. My independent findings and conclusions are documented. Patient seen/evaluated at bedside 4/4/2018. I agree with the resident progress note/outlined plan of care. My independent findings and conclusions are documented.     69 y/o female w/ DM T II, progressive back pain referred from PMD office for assessment of leukocytosis    Pt reports months of back pain, which has worsened over the past 2 weeks. No radiation of pain to legs. Patient denies bowel/bladder incontinence, thigh numbness, + lower extremity weakness. daughter states she has had imaging of her back before but not an mri spine. Of note, patient was in the ED on March 25 with back pain and low grade temperature to 100.1 and discharged home with recommendation for follow-up. Patient has taken Naprosyn for back pain. Of note, patient also had a syncopal episode last week. Daughter reports poor oral intake, malaise fatigue    No melena, hematochezia/bright red blood per rectum.    vitals reviewed  AAOx3 NaD  S1S2 RRR  CTAb/l no accessory muscle use  soft, NT, ND, +BS  tenderness to palpation of low mid-line back  no saddle anesthesia, sensation intact at all 4 limbs/body/face  4+ bilateral lower extremity strength  patient not appropriately able to relax for lower extremity reflex testing    labs reviewed  hgb/HCt 7.4/23.2    1. severe leukocytosis of unclear etiology  2. progressive low back pain  3. anemia  4.  syncope  5. hyponatremia  6. dehydration  7. DM T II w/out long term insulin  8. hypoalbuminemia    blood cultures, procalcitonin  serial cbc, maintain active type and screen--> transfuse for Hgb/HCT <  mri lumbar spine with contrast--> expedite this study, need to assess for discitis/OM  -check iron profile  -cT chest/abd/pelvis  -discordent albumin/serum protein values--> check spep/upep  -infectious disease, hematology consults  -telemetry, TTE  -ivf hydration, orthostatics Patient seen/evaluated at bedside 4/4/2018. I agree with the resident progress note/outlined plan of care. My independent findings and conclusions are documented.     67 y/o female w/ DM T II, progressive back pain referred from PMD office for assessment of leukocytosis    Pt reports months of back pain, which has worsened over the past 2 weeks. No radiation of pain to legs. Patient denies bowel/bladder incontinence, thigh numbness, + lower extremity weakness. daughter states she has had imaging of her back before but not an mri spine. Of note, patient was in the ED on March 25 with back pain and low grade temperature to 100.1 and discharged home with recommendation for follow-up. Patient has taken Naprosyn for back pain. Of note, patient also had a syncopal episode last week. Daughter reports poor oral intake, malaise fatigue    No melena, hematochezia/bright red blood per rectum.    vitals reviewed  AAOx3 NaD  S1S2 RRR  CTAb/l no accessory muscle use  soft, NT, ND, +BS  tenderness to palpation of low mid-line back  no saddle anesthesia, sensation intact at all 4 limbs/body/face  4+ bilateral lower extremity strength  patient not appropriately able to relax for lower extremity reflex testing    labs reviewed  hgb/HCt 7.4/23.2    1. severe leukocytosis of unclear etiology  2. progressive low back pain  3. anemia  4.  syncope  5. hyponatremia  6. dehydration  7. DM T II w/out long term insulin  8. hypoalbuminemia    blood cultures, procalcitonin  serial cbc, maintain active type and screen--> transfuse for Hgb/HCT <  mri lumbar spine with contrast--> expedite this study, need to assess for discitis/OM  -check iron profile  -cT chest/abd/pelvis  -discordent albumin/serum protein values--> check spep/upep  -serum/urine immunofixation  -infectious disease, hematology consults  -telemetry, TTE  -ivf hydration, orthostatics Patient seen/evaluated at bedside 4/4/2018. I agree with the resident progress note/outlined plan of care. My independent findings and conclusions are documented.     69 y/o female w/ DM T II, progressive back pain referred from PMD office for assessment of leukocytosis    Pt reports months of back pain, which has worsened over the past 2 weeks. No radiation of pain to legs. Patient denies bowel/bladder incontinence, thigh numbness, + lower extremity weakness. daughter states she has had imaging of her back before but not an mri spine. Of note, patient was in the ED on March 25 with back pain and low grade temperature to 100.1 and discharged home with recommendation for follow-up. Patient has taken Naprosyn for back pain. Of note, patient also had a syncopal episode last week. Daughter reports poor oral intake, malaise fatigue    No melena, hematochezia/bright red blood per rectum.    In the ED, was given an empiric dose of vancomycin/zosyn    vitals reviewed  AAOx3 NaD  S1S2 RRR  CTAb/l no accessory muscle use  soft, NT, ND, +BS  tenderness to palpation of low mid-line back  no saddle anesthesia, sensation intact at all 4 limbs/body/face  4+ bilateral lower extremity strength  patient not appropriately able to relax for lower extremity reflex testing    labs reviewed  hgb/HCt 7.4/23.2    1. SIRS  1. severe leukocytosis of unclear etiology  2. progressive low back pain  3. anemia  4.  syncope  5. hyponatremia  6. dehydration  7. DM T II w/out long term insulin  8. hypoalbuminemia    blood cultures, procalcitonin  serial cbc, maintain active type and screen--> transfuse for Hgb/HCT <  mri lumbar spine with contrast--> expedite this study, need to assess for discitis/OM  -check iron profile  -CT chest/abd/pelvis  -IVf hydration, check urine sodium/urine creatinine  -discordent albumin/serum protein values--> check spep/upep  -serum/urine immunofixation  -infectious disease, hematology consults  -telemetry, TTE  -ivf hydration, orthostatics Patient seen/evaluated at bedside 4/4/2018. I agree with the resident progress note/outlined plan of care. My independent findings and conclusions are documented.     67 y/o female w/ DM T II, progressive back pain referred from PMD office for assessment of leukocytosis    Pt reports months of back pain, which has worsened over the past 2 weeks. No radiation of pain to legs. Patient denies bowel/bladder incontinence, thigh numbness, + lower extremity weakness. daughter states she has had imaging of her back before but not an mri spine. Of note, patient was in the ED on March 25 with back pain and low grade temperature to 100.1 and discharged home with recommendation for follow-up. Patient has taken Naprosyn for back pain. Of note, patient also had a syncopal episode last week. Daughter reports poor oral intake, malaise fatigue    No melena, hematochezia/bright red blood per rectum.    In the ED, was given an empiric dose of vancomycin/zosyn    vitals reviewed  AAOx3 NaD  S1S2 RRR  CTAb/l no accessory muscle use  soft, NT, ND, +BS  tenderness to palpation of low mid-line back  no saddle anesthesia, sensation intact at all 4 limbs/body/face  4+ bilateral lower extremity strength  patient not appropriately able to relax for lower extremity reflex testing    labs reviewed  hgb/HCt 7.4/23.2    1. SIRS  1. severe leukocytosis of unclear etiology  2. progressive low back pain  3. anemia  4.  syncope  5. hyponatremia  6. dehydration  7. DM T II w/out long term insulin  8. hypoalbuminemia    blood cultures, procalcitonin  serial cbc, maintain active type and screen--> transfuse for Hgb/HCT <  mri lumbar spine with contrast--> expedite this study, need to assess for discitis/OM  -check iron profile  -CT chest/abd/pelvis  -IVf hydration, check urine sodium/urine creatinine  -discordent albumin/serum protein values--> check spep/upep  -serum/urine immunofixation  -flow cytometry was ordered and pending  -infectious disease, hematology consults  -stool guiaiac  -telemetry, TTE  -ivf hydration, orthostatics

## 2018-04-04 NOTE — H&P ADULT - PROBLEM SELECTOR PLAN 4
c/w home losartan  -will hold HCTZ due to likely contribution to hyponatremia  -monitor bp  -may need to start CCB if BP becomes uncontrolled

## 2018-04-04 NOTE — H&P ADULT - PROBLEM SELECTOR PLAN 6
IMPROVE VTE Individual Risk Assessment    RISK                                                          Points  [] Previous VTE                                           3  [] Thrombophilia                                        2  [] Lower limb paralysis                              2   [] Current Cancer                                       2   [x] Immobilization > 24 hrs                        1  [] ICU/CCU stay > 24 hours                       1  [x] Age > 60                                                   1    IMPROVE VTE Score: 2, will not give chem dvt ppx 2/2 to low h/h

## 2018-04-04 NOTE — ED PROVIDER NOTE - PMH
Acute gastric ulcer with hemorrhage    Essential hypertension    HLD (hyperlipidemia)    Type 2 diabetes mellitus without complication

## 2018-04-04 NOTE — H&P ADULT - NSHPPHYSICALEXAM_GEN_ALL_CORE
Vital Signs Last 24 Hrs  T(C): 36.7 (04 Apr 2018 15:46), Max: 36.7 (04 Apr 2018 15:46)  T(F): 98.1 (04 Apr 2018 15:46), Max: 98.1 (04 Apr 2018 15:46)  HR: 91 (04 Apr 2018 15:46) (91 - 92)  BP: 101/68 (04 Apr 2018 15:46) (101/68 - 110/69)  BP(mean): --  RR: 17 (04 Apr 2018 15:46) (17 - 18)  SpO2: 100% (04 Apr 2018 15:46) (92% - 100%)    GENERAL: in mild distress due to pain, well-groomed, well-developed  HEAD:  Atraumatic, Normocephalic  EYES: EOMI, PERRLA, conjunctiva and sclera clear  ENMT: No tonsillar erythema, exudates, or enlargement; Moist mucous membranes  NECK: Supple, No JVD, Normal thyroid  NERVOUS SYSTEM:  Alert & Oriented X3, negative SLR, power 4/5 in bilateral upper extremities, power 3.5/5 in bilateral lower extremities, tenderness in back  CHEST/LUNG: Clear to auscultation bilaterally; No rales, rhonchi, wheezing, or rubs  HEART: Regular rate and rhythm; No murmurs, rubs, or gallops  ABDOMEN: Soft, Nontender, Nondistended; Bowel sounds present  EXTREMITIES:  2+ Peripheral Pulses, No clubbing, cyanosis, or edema

## 2018-04-04 NOTE — H&P ADULT - PROBLEM SELECTOR PLAN 2
likely secondary to HCTZ use  -improve compared to previous  -will give low dose fluids  -check uric acid, serum osm  -monitor AM repeat

## 2018-04-04 NOTE — ED ADULT TRIAGE NOTE - CHIEF COMPLAINT QUOTE
pt sent from the dr office for the abnormal lab values . pt c/o generalised  body pain pt synopsized last week

## 2018-04-04 NOTE — H&P ADULT - PROBLEM SELECTOR PLAN 3
not on any medication at this time  -will give diabetic diet  -f/u A1c  -will not do FS or give HSS unless A1c is elevated

## 2018-04-04 NOTE — H&P ADULT - ASSESSMENT
68F from home PMH HTN, DM, HLD who comes to hospital for 2 weeks of back pain. Unknown etiology at this time. Concerning for discitis due to symptoms vs other infection. However also concern for hematogenous malignancy due to changes in CBC and high protein/albumin ratio

## 2018-04-04 NOTE — H&P ADULT - PROBLEM SELECTOR PLAN 1
patient complains primarily of severe back pain radiating to rest of her body  -will check MRI for cord compression vs discitis  -will r/o other infections  -check for cancer with flow cytometry, protein electrophoresis  -monitor for improvement  -will increase pain control with tramadol

## 2018-04-05 DIAGNOSIS — G06.2 EXTRADURAL AND SUBDURAL ABSCESS, UNSPECIFIED: ICD-10-CM

## 2018-04-05 DIAGNOSIS — K92.2 GASTROINTESTINAL HEMORRHAGE, UNSPECIFIED: ICD-10-CM

## 2018-04-05 DIAGNOSIS — D64.9 ANEMIA, UNSPECIFIED: ICD-10-CM

## 2018-04-05 LAB
ANION GAP SERPL CALC-SCNC: 10 MMOL/L — SIGNIFICANT CHANGE UP (ref 5–17)
ANION GAP SERPL CALC-SCNC: 7 MMOL/L — SIGNIFICANT CHANGE UP (ref 5–17)
BUN SERPL-MCNC: 20 MG/DL — HIGH (ref 7–18)
BUN SERPL-MCNC: 37 MG/DL — HIGH (ref 7–18)
CALCIUM SERPL-MCNC: 6.8 MG/DL — LOW (ref 8.4–10.5)
CALCIUM SERPL-MCNC: 7.6 MG/DL — LOW (ref 8.4–10.5)
CHLORIDE SERPL-SCNC: 95 MMOL/L — LOW (ref 96–108)
CHLORIDE SERPL-SCNC: 95 MMOL/L — LOW (ref 96–108)
CHOLEST SERPL-MCNC: <50 MG/DL — SIGNIFICANT CHANGE UP (ref 10–199)
CO2 SERPL-SCNC: 24 MMOL/L — SIGNIFICANT CHANGE UP (ref 22–31)
CO2 SERPL-SCNC: 25 MMOL/L — SIGNIFICANT CHANGE UP (ref 22–31)
CREAT SERPL-MCNC: 0.8 MG/DL — SIGNIFICANT CHANGE UP (ref 0.5–1.3)
CREAT SERPL-MCNC: 0.92 MG/DL — SIGNIFICANT CHANGE UP (ref 0.5–1.3)
CRP SERPL-MCNC: 16.5 MG/DL — HIGH (ref 0–0.4)
FERRITIN SERPL-MCNC: 530 NG/ML — HIGH (ref 15–150)
FOLATE SERPL-MCNC: 6.9 NG/ML — SIGNIFICANT CHANGE UP (ref 4.8–24.2)
GLUCOSE SERPL-MCNC: 174 MG/DL — HIGH (ref 70–99)
GLUCOSE SERPL-MCNC: 183 MG/DL — HIGH (ref 70–99)
HAPTOGLOB SERPL-MCNC: 182 MG/DL — SIGNIFICANT CHANGE UP (ref 34–200)
HBA1C BLD-MCNC: 6.9 % — HIGH (ref 4–5.6)
HCT VFR BLD CALC: 18.6 % — CRITICAL LOW (ref 34.5–45)
HCT VFR BLD CALC: 18.6 % — CRITICAL LOW (ref 34.5–45)
HCT VFR BLD CALC: 20 % — CRITICAL LOW (ref 34.5–45)
HDLC SERPL-MCNC: 12 MG/DL — LOW (ref 40–125)
HGB BLD-MCNC: 5.9 G/DL — CRITICAL LOW (ref 11.5–15.5)
HGB BLD-MCNC: 6.2 G/DL — CRITICAL LOW (ref 11.5–15.5)
HGB BLD-MCNC: 6.3 G/DL — CRITICAL LOW (ref 11.5–15.5)
LACTATE SERPL-SCNC: 1.6 MMOL/L — SIGNIFICANT CHANGE UP (ref 0.7–2)
LIPID PNL WITH DIRECT LDL SERPL: SIGNIFICANT CHANGE UP MG/DL
MAGNESIUM SERPL-MCNC: 1.7 MG/DL — SIGNIFICANT CHANGE UP (ref 1.6–2.6)
MCHC RBC-ENTMCNC: 28.8 PG — SIGNIFICANT CHANGE UP (ref 27–34)
MCHC RBC-ENTMCNC: 28.9 PG — SIGNIFICANT CHANGE UP (ref 27–34)
MCHC RBC-ENTMCNC: 31.1 PG — SIGNIFICANT CHANGE UP (ref 27–34)
MCHC RBC-ENTMCNC: 31.2 GM/DL — LOW (ref 32–36)
MCHC RBC-ENTMCNC: 31.8 GM/DL — LOW (ref 32–36)
MCHC RBC-ENTMCNC: 34 GM/DL — SIGNIFICANT CHANGE UP (ref 32–36)
MCV RBC AUTO: 91.1 FL — SIGNIFICANT CHANGE UP (ref 80–100)
MCV RBC AUTO: 91.5 FL — SIGNIFICANT CHANGE UP (ref 80–100)
MCV RBC AUTO: 92.5 FL — SIGNIFICANT CHANGE UP (ref 80–100)
OSMOLALITY UR: 480 MOS/KG — SIGNIFICANT CHANGE UP (ref 50–1200)
PHOSPHATE SERPL-MCNC: 4.1 MG/DL — SIGNIFICANT CHANGE UP (ref 2.5–4.5)
PLATELET # BLD AUTO: 266 K/UL — SIGNIFICANT CHANGE UP (ref 150–400)
PLATELET # BLD AUTO: 280 K/UL — SIGNIFICANT CHANGE UP (ref 150–400)
PLATELET # BLD AUTO: 318 K/UL — SIGNIFICANT CHANGE UP (ref 150–400)
POTASSIUM SERPL-MCNC: 4.3 MMOL/L — SIGNIFICANT CHANGE UP (ref 3.5–5.3)
POTASSIUM SERPL-MCNC: 4.6 MMOL/L — SIGNIFICANT CHANGE UP (ref 3.5–5.3)
POTASSIUM SERPL-SCNC: 4.3 MMOL/L — SIGNIFICANT CHANGE UP (ref 3.5–5.3)
POTASSIUM SERPL-SCNC: 4.6 MMOL/L — SIGNIFICANT CHANGE UP (ref 3.5–5.3)
PROCALCITONIN SERPL-MCNC: 1.22 NG/ML — HIGH (ref 0–0.04)
PROT SERPL-MCNC: 5.1 G/DL — LOW (ref 6–8.3)
PROT SERPL-MCNC: 5.1 G/DL — LOW (ref 6–8.3)
PROT SERPL-MCNC: 5.3 G/DL — LOW (ref 6–8.3)
PROT SERPL-MCNC: 5.3 G/DL — LOW (ref 6–8.3)
RBC # BLD: 1.99 M/UL — LOW (ref 3.8–5.2)
RBC # BLD: 2.03 M/UL — LOW (ref 3.8–5.2)
RBC # BLD: 2.04 M/UL — LOW (ref 3.8–5.2)
RBC # BLD: 2.16 M/UL — LOW (ref 3.8–5.2)
RBC # FLD: 12.6 % — SIGNIFICANT CHANGE UP (ref 10.3–14.5)
RBC # FLD: 13.1 % — SIGNIFICANT CHANGE UP (ref 10.3–14.5)
RBC # FLD: 13.4 % — SIGNIFICANT CHANGE UP (ref 10.3–14.5)
RETICS #: 59.3 K/UL — SIGNIFICANT CHANGE UP (ref 25–125)
RETICS/RBC NFR: 3 % — HIGH (ref 0.5–2.5)
SODIUM SERPL-SCNC: 127 MMOL/L — LOW (ref 135–145)
SODIUM SERPL-SCNC: 129 MMOL/L — LOW (ref 135–145)
SODIUM UR-SCNC: 85 MMOL/L — SIGNIFICANT CHANGE UP (ref 40–220)
TOTAL CHOLESTEROL/HDL RATIO MEASUREMENT: SIGNIFICANT CHANGE UP RATIO (ref 3.3–7.1)
TRIGL SERPL-MCNC: 61 MG/DL — SIGNIFICANT CHANGE UP (ref 10–149)
TSH SERPL-MCNC: 2.14 UU/ML — SIGNIFICANT CHANGE UP (ref 0.34–4.82)
VIT B12 SERPL-MCNC: >2000 PG/ML — HIGH (ref 232–1245)
WBC # BLD: 18.7 K/UL — HIGH (ref 3.8–10.5)
WBC # BLD: 20 K/UL — HIGH (ref 3.8–10.5)
WBC # BLD: 20.2 K/UL — HIGH (ref 3.8–10.5)
WBC # FLD AUTO: 18.7 K/UL — HIGH (ref 3.8–10.5)
WBC # FLD AUTO: 20 K/UL — HIGH (ref 3.8–10.5)
WBC # FLD AUTO: 20.2 K/UL — HIGH (ref 3.8–10.5)

## 2018-04-05 PROCEDURE — 72147 MRI CHEST SPINE W/DYE: CPT | Mod: 26

## 2018-04-05 PROCEDURE — 72149 MRI LUMBAR SPINE W/DYE: CPT | Mod: 26

## 2018-04-05 PROCEDURE — 88189 FLOWCYTOMETRY/READ 16 & >: CPT

## 2018-04-05 RX ORDER — ONDANSETRON 8 MG/1
4 TABLET, FILM COATED ORAL EVERY 8 HOURS
Qty: 0 | Refills: 0 | Status: COMPLETED | OUTPATIENT
Start: 2018-04-05 | End: 2018-04-05

## 2018-04-05 RX ORDER — PANTOPRAZOLE SODIUM 20 MG/1
40 TABLET, DELAYED RELEASE ORAL ONCE
Qty: 0 | Refills: 0 | Status: DISCONTINUED | OUTPATIENT
Start: 2018-04-05 | End: 2018-04-05

## 2018-04-05 RX ORDER — SODIUM CHLORIDE 9 MG/ML
1000 INJECTION INTRAMUSCULAR; INTRAVENOUS; SUBCUTANEOUS ONCE
Qty: 0 | Refills: 0 | Status: COMPLETED | OUTPATIENT
Start: 2018-04-05 | End: 2018-04-05

## 2018-04-05 RX ORDER — SENNA PLUS 8.6 MG/1
2 TABLET ORAL AT BEDTIME
Qty: 0 | Refills: 0 | Status: DISCONTINUED | OUTPATIENT
Start: 2018-04-05 | End: 2018-04-05

## 2018-04-05 RX ORDER — VANCOMYCIN HCL 1 G
1000 VIAL (EA) INTRAVENOUS ONCE
Qty: 0 | Refills: 0 | Status: COMPLETED | OUTPATIENT
Start: 2018-04-05 | End: 2018-04-05

## 2018-04-05 RX ORDER — MORPHINE SULFATE 50 MG/1
1 CAPSULE, EXTENDED RELEASE ORAL EVERY 6 HOURS
Qty: 0 | Refills: 0 | Status: DISCONTINUED | OUTPATIENT
Start: 2018-04-05 | End: 2018-04-06

## 2018-04-05 RX ORDER — PANTOPRAZOLE SODIUM 20 MG/1
40 TABLET, DELAYED RELEASE ORAL EVERY 12 HOURS
Qty: 0 | Refills: 0 | Status: DISCONTINUED | OUTPATIENT
Start: 2018-04-05 | End: 2018-04-05

## 2018-04-05 RX ORDER — DOCUSATE SODIUM 100 MG
100 CAPSULE ORAL
Qty: 0 | Refills: 0 | Status: DISCONTINUED | OUTPATIENT
Start: 2018-04-05 | End: 2018-04-05

## 2018-04-05 RX ORDER — VANCOMYCIN HCL 1 G
VIAL (EA) INTRAVENOUS
Qty: 0 | Refills: 0 | Status: DISCONTINUED | OUTPATIENT
Start: 2018-04-05 | End: 2018-04-07

## 2018-04-05 RX ORDER — ACETAMINOPHEN 500 MG
650 TABLET ORAL EVERY 6 HOURS
Qty: 0 | Refills: 0 | Status: DISCONTINUED | OUTPATIENT
Start: 2018-04-05 | End: 2018-04-06

## 2018-04-05 RX ORDER — PANTOPRAZOLE SODIUM 20 MG/1
8 TABLET, DELAYED RELEASE ORAL
Qty: 80 | Refills: 0 | Status: DISCONTINUED | OUTPATIENT
Start: 2018-04-05 | End: 2018-04-08

## 2018-04-05 RX ORDER — SODIUM CHLORIDE 9 MG/ML
1000 INJECTION INTRAMUSCULAR; INTRAVENOUS; SUBCUTANEOUS
Qty: 0 | Refills: 0 | Status: DISCONTINUED | OUTPATIENT
Start: 2018-04-05 | End: 2018-04-06

## 2018-04-05 RX ORDER — FUROSEMIDE 40 MG
20 TABLET ORAL ONCE
Qty: 0 | Refills: 0 | Status: COMPLETED | OUTPATIENT
Start: 2018-04-05 | End: 2018-04-05

## 2018-04-05 RX ORDER — LIDOCAINE 4 G/100G
1 CREAM TOPICAL DAILY
Qty: 0 | Refills: 0 | Status: DISCONTINUED | OUTPATIENT
Start: 2018-04-05 | End: 2018-04-08

## 2018-04-05 RX ORDER — VANCOMYCIN HCL 1 G
1000 VIAL (EA) INTRAVENOUS EVERY 12 HOURS
Qty: 0 | Refills: 0 | Status: DISCONTINUED | OUTPATIENT
Start: 2018-04-06 | End: 2018-04-07

## 2018-04-05 RX ADMIN — TRAMADOL HYDROCHLORIDE 50 MILLIGRAM(S): 50 TABLET ORAL at 07:24

## 2018-04-05 RX ADMIN — Medication 250 MILLIGRAM(S): at 16:40

## 2018-04-05 RX ADMIN — SODIUM CHLORIDE 50 MILLILITER(S): 9 INJECTION, SOLUTION INTRAVENOUS at 00:45

## 2018-04-05 RX ADMIN — LOSARTAN POTASSIUM 100 MILLIGRAM(S): 100 TABLET, FILM COATED ORAL at 06:27

## 2018-04-05 RX ADMIN — ONDANSETRON 4 MILLIGRAM(S): 8 TABLET, FILM COATED ORAL at 21:09

## 2018-04-05 RX ADMIN — Medication 250 MILLIGRAM(S): at 01:41

## 2018-04-05 RX ADMIN — Medication 20 MILLIGRAM(S): at 16:31

## 2018-04-05 RX ADMIN — ATORVASTATIN CALCIUM 40 MILLIGRAM(S): 80 TABLET, FILM COATED ORAL at 00:45

## 2018-04-05 RX ADMIN — TRAMADOL HYDROCHLORIDE 50 MILLIGRAM(S): 50 TABLET ORAL at 06:30

## 2018-04-05 RX ADMIN — PIPERACILLIN AND TAZOBACTAM 200 GRAM(S): 4; .5 INJECTION, POWDER, LYOPHILIZED, FOR SOLUTION INTRAVENOUS at 00:43

## 2018-04-05 RX ADMIN — PANTOPRAZOLE SODIUM 10 MG/HR: 20 TABLET, DELAYED RELEASE ORAL at 22:26

## 2018-04-05 RX ADMIN — TRAMADOL HYDROCHLORIDE 50 MILLIGRAM(S): 50 TABLET ORAL at 00:46

## 2018-04-05 RX ADMIN — Medication 650 MILLIGRAM(S): at 16:32

## 2018-04-05 RX ADMIN — SODIUM CHLORIDE 2000 MILLILITER(S): 9 INJECTION INTRAMUSCULAR; INTRAVENOUS; SUBCUTANEOUS at 22:00

## 2018-04-05 RX ADMIN — LIDOCAINE 1 PATCH: 4 CREAM TOPICAL at 13:02

## 2018-04-05 NOTE — CONSULT NOTE ADULT - SUBJECTIVE AND OBJECTIVE BOX
Patient is a 68y old  Female who presents with a chief complaint of back pain x2 weeks (04 Apr 2018 17:58)      HPI:  68F from home PMH HTN, DM, HLD who comes to hospital for 2 weeks of back pain. Daughter says that she has had longstanding back pain but in the past 2 weeks, it has worsened significantly. She is unable to walk, the pain is very severe, she feels dizzy, weakness in the bilateral LE, says she cannot balance well. She came to ED last week, had temperature of 100.1, and was discharged from the ED with outpatient follow up. Today she saw her regular doctor who noted all her complaints and told her to come to the hospital. Patient also complains of night sweats, subjective fevers at night. Denies any cough, sob, nausea, vomiting, diarrhea, abd pain. Moved here from [place] 8 years ago. Denies any bowel or bladder incontinence. (04 Apr 2018 17:58)       ROS:  Negative except for:    PAST MEDICAL & SURGICAL HISTORY:  Essential hypertension  Type 2 diabetes mellitus without complication  HLD (hyperlipidemia)  H/O tubal ligation      SOCIAL HISTORY:    FAMILY HISTORY:  No pertinent family history in first degree relatives      MEDICATIONS  (STANDING):  atorvastatin 40 milliGRAM(s) Oral at bedtime  docusate sodium 100 milliGRAM(s) Oral two times a day  lidocaine   Patch 1 Patch Transdermal daily  losartan 100 milliGRAM(s) Oral daily  pantoprazole  Injectable 40 milliGRAM(s) IV Push every 12 hours  senna 2 Tablet(s) Oral at bedtime  sodium chloride 0.9%. 1000 milliLiter(s) (50 mL/Hr) IV Continuous <Continuous>  vancomycin  IVPB        MEDICATIONS  (PRN):  acetaminophen   Tablet 650 milliGRAM(s) Oral every 6 hours PRN For Temp greater than 38 C (100.4 F)  acetaminophen   Tablet. 650 milliGRAM(s) Oral every 6 hours PRN Mild Pain (1 - 3)  morphine  - Injectable 1 milliGRAM(s) IV Push every 6 hours PRN Severe Pain (7 - 10)  traMADol 50 milliGRAM(s) Oral every 8 hours PRN Moderate Pain (4 - 6)      Allergies    No Known Allergies    Intolerances        Vital Signs Last 24 Hrs  T(C): 36.7 (05 Apr 2018 15:00), Max: 36.7 (04 Apr 2018 23:49)  T(F): 98 (05 Apr 2018 15:00), Max: 98 (04 Apr 2018 23:49)  HR: 97 (05 Apr 2018 15:00) (90 - 98)  BP: 121/55 (05 Apr 2018 15:00) (108/64 - 121/55)  BP(mean): --  RR: 16 (05 Apr 2018 15:00) (16 - 18)  SpO2: 94% (05 Apr 2018 15:00) (93% - 99%)    PHYSICAL EXAM  General: adult in NAD  HEENT: clear oropharynx, anicteric sclera, pink conjunctiva  Neck: supple  CV: normal S1/S2 with no murmur rubs or gallops  Lungs: positive air movement b/l ant lungs,clear to auscultation, no wheezes, no rales  Abdomen: soft non-tender non-distended, no hepatosplenomegaly  Ext: no clubbing cyanosis or edema  Skin: no rashes and no petechiae  Neuro: alert and oriented X 4, no focal deficits      LABS:                          6.2    20.0  )-----------( 318      ( 05 Apr 2018 12:06 )             20.0         Mean Cell Volume : 92.5 fl  Mean Cell Hemoglobin : 28.8 pg  Mean Cell Hemoglobin Concentration : 31.2 gm/dL  Auto Neutrophil # : x  Auto Lymphocyte # : x  Auto Monocyte # : x  Auto Eosinophil # : x  Auto Basophil # : x  Auto Neutrophil % : x  Auto Lymphocyte % : x  Auto Monocyte % : x  Auto Eosinophil % : x  Auto Basophil % : x      Serial CBC's  04-05 @ 12:06  Hct-20.0 / Hgb-6.2 / Plat-318 / RBC-2.16 / WBC-20.0  Serial CBC's  04-05 @ 10:01  Hct--- / Hgb--- / Plat--- / RBC-1.99 / WBC---  Serial CBC's  04-05 @ 07:24  Hct-18.6 / Hgb-6.3 / Plat-280 / RBC-2.03 / WBC-18.7  Serial CBC's  04-04 @ 18:45  Hct-22.1 / Hgb-7.1 / Plat-260 / RBC-2.40 / WBC-28.7  Serial CBC's  04-04 @ 15:03  Hct-23.2 / Hgb-7.4 / Plat-268 / RBC-2.49 / WBC-23.4      04-05    127<L>  |  95<L>  |  20<H>  ----------------------------<  183<H>  4.6   |  25  |  0.80    Ca    7.6<L>      05 Apr 2018 07:24  Phos  4.1     04-05  Mg     1.7     04-05    TPro  5.3<L>  /  Alb  x   /  TBili  x   /  DBili  x   /  AST  x   /  ALT  x   /  AlkPhos  x   04-05          Folate, Serum: 6.9 ng/mL (04-05 @ 10:02)  Vitamin B12, Serum: >2000 pg/mL (04-05 @ 10:02)  Reticulocyte Percent: 3.0 % (04-05 @ 10:01)  Ferritin, Serum: 530 ng/mL (04-04 @ 22:27)  Iron - Total Binding Capacity.: 197 ug/dL (04-04 @ 18:45)              BLOOD SMEAR INTERPRETATION:  Rouleaux formation ,  atypical lymphocytes, bands, occ plasma cells Patient is a 68y old  Female who presents with a chief complaint of back pain x2 weeks (04 Apr 2018 17:58)    history as per patient and daughter by the bedside.  HPI:  68F from home PMH HTN, DM, HLD who comes to hospital for 2 weeks of back pain. Daughter says that she has had longstanding back pain but in the past 2 weeks, it has worsened significantly. She is unable to walk, the pain is very severe, she feels dizzy, weakness in the bilateral LE, says she cannot balance well. She came to ED last week, had temperature of 100.1, and was discharged from the ED with outpatient follow up. Today she saw her regular doctor who noted all her complaints and told her to come to the hospital. Patient also complains of night sweats, subjective fevers at night. Denies any cough, sob, nausea, vomiting, diarrhea, abd pain. Moved here from [place] 8 years ago. Denies any bowel or bladder incontinence. (04 Apr 2018 17:58)     Found to have reverse a/g ratio and severe anemia    ROS:  has lost weight. no itch or rash, no arthritis. has fever and shortness of breath at times    PAST MEDICAL & SURGICAL HISTORY:  Essential hypertension  Type 2 diabetes mellitus without complication  HLD (hyperlipidemia)  H/O tubal ligation      SOCIAL HISTORY: had seven pregnancies. lives at home, never worked outside    FAMILY HISTORY:  No pertinent family history in first degree relatives      MEDICATIONS  (STANDING):  atorvastatin 40 milliGRAM(s) Oral at bedtime  docusate sodium 100 milliGRAM(s) Oral two times a day  lidocaine   Patch 1 Patch Transdermal daily  losartan 100 milliGRAM(s) Oral daily  pantoprazole  Injectable 40 milliGRAM(s) IV Push every 12 hours  senna 2 Tablet(s) Oral at bedtime  sodium chloride 0.9%. 1000 milliLiter(s) (50 mL/Hr) IV Continuous <Continuous>  vancomycin  IVPB        MEDICATIONS  (PRN):  acetaminophen   Tablet 650 milliGRAM(s) Oral every 6 hours PRN For Temp greater than 38 C (100.4 F)  acetaminophen   Tablet. 650 milliGRAM(s) Oral every 6 hours PRN Mild Pain (1 - 3)  morphine  - Injectable 1 milliGRAM(s) IV Push every 6 hours PRN Severe Pain (7 - 10)  traMADol 50 milliGRAM(s) Oral every 8 hours PRN Moderate Pain (4 - 6)      Allergies    No Known Allergies    Intolerances        Vital Signs Last 24 Hrs  T(C): 36.7 (05 Apr 2018 15:00), Max: 36.7 (04 Apr 2018 23:49)  T(F): 98 (05 Apr 2018 15:00), Max: 98 (04 Apr 2018 23:49)  HR: 97 (05 Apr 2018 15:00) (90 - 98)  BP: 121/55 (05 Apr 2018 15:00) (108/64 - 121/55)  BP(mean): --  RR: 16 (05 Apr 2018 15:00) (16 - 18)  SpO2: 94% (05 Apr 2018 15:00) (93% - 99%)    PHYSICAL EXAM  General: adult in NAD  HEENT: clear oropharynx, anicteric sclera, pink conjunctiva  Neck: supple  CV: normal S1/S2 with no murmur rubs or gallops  Lungs: positive air movement b/l ant lungs,clear to auscultation, no wheezes, no rales  Abdomen: soft non-tender non-distended, no hepatosplenomegaly  Ext: no clubbing cyanosis or edema  Skin: no rashes and no petechiae  Neuro: alert and oriented X 4, no focal deficits  Breast : no lumps      LABS:                          6.2    20.0  )-----------( 318      ( 05 Apr 2018 12:06 )             20.0         Mean Cell Volume : 92.5 fl  Mean Cell Hemoglobin : 28.8 pg  Mean Cell Hemoglobin Concentration : 31.2 gm/dL  Auto Neutrophil # : x  Auto Lymphocyte # : x  Auto Monocyte # : x  Auto Eosinophil # : x  Auto Basophil # : x  Auto Neutrophil % : x  Auto Lymphocyte % : x  Auto Monocyte % : x  Auto Eosinophil % : x  Auto Basophil % : x      Serial CBC's  04-05 @ 12:06  Hct-20.0 / Hgb-6.2 / Plat-318 / RBC-2.16 / WBC-20.0  Serial CBC's  04-05 @ 10:01  Hct--- / Hgb--- / Plat--- / RBC-1.99 / WBC---  Serial CBC's  04-05 @ 07:24  Hct-18.6 / Hgb-6.3 / Plat-280 / RBC-2.03 / WBC-18.7  Serial CBC's  04-04 @ 18:45  Hct-22.1 / Hgb-7.1 / Plat-260 / RBC-2.40 / WBC-28.7  Serial CBC's  04-04 @ 15:03  Hct-23.2 / Hgb-7.4 / Plat-268 / RBC-2.49 / WBC-23.4      04-05    127<L>  |  95<L>  |  20<H>  ----------------------------<  183<H>  4.6   |  25  |  0.80    Ca    7.6<L>      05 Apr 2018 07:24  Phos  4.1     04-05  Mg     1.7     04-05    TPro  5.3<L>  /  Alb  x   /  TBili  x   /  DBili  x   /  AST  x   /  ALT  x   /  AlkPhos  x   04-05          Folate, Serum: 6.9 ng/mL (04-05 @ 10:02)  Vitamin B12, Serum: >2000 pg/mL (04-05 @ 10:02)  Reticulocyte Percent: 3.0 % (04-05 @ 10:01)  Ferritin, Serum: 530 ng/mL (04-04 @ 22:27)  Iron - Total Binding Capacity.: 197 ug/dL (04-04 @ 18:45)              BLOOD SMEAR INTERPRETATION:  Rouleaux formation ,  atypical lymphocytes, bands, occ plasma cells

## 2018-04-05 NOTE — PROGRESS NOTE ADULT - SUBJECTIVE AND OBJECTIVE BOX
PGY 1 Note discussed with supervising resident and primary attending    Patient is a 68y old  Female who presents with a chief complaint of back pain x2 weeks (2018 17:58)      INTERVAL HPI/OVERNIGHT EVENTS: no new complaints    MEDICATIONS  (STANDING):  atorvastatin 40 milliGRAM(s) Oral at bedtime  lactated ringers. 1000 milliLiter(s) (50 mL/Hr) IV Continuous <Continuous>  lidocaine   Patch 1 Patch Transdermal daily  losartan 100 milliGRAM(s) Oral daily  vancomycin  IVPB        MEDICATIONS  (PRN):  traMADol 50 milliGRAM(s) Oral every 8 hours PRN Moderate Pain (4 - 6)      __________________________________________________  REVIEW OF SYSTEMS:    CONSTITUTIONAL: No fever,   EYES: no acute visual disturbances  NECK: No pain or stiffness  RESPIRATORY: No cough; No shortness of breath  CARDIOVASCULAR: No chest pain, no palpitations  GASTROINTESTINAL: No pain. No nausea or vomiting; No diarrhea   NEUROLOGICAL: No headache or numbness, no tremors  MUSCULOSKELETAL: No joint pain, no muscle pain  GENITOURINARY: no dysuria, no frequency, no hesitancy  PSYCHIATRY: no depression , no anxiety  ALL OTHER  ROS negative        Vital Signs Last 24 Hrs  T(C): 36.7 (2018 04:57), Max: 36.7 (2018 15:46)  T(F): 98 (2018 04:57), Max: 98.1 (2018 15:46)  HR: 98 (2018 04:57) (90 - 98)  BP: 117/58 (2018 04:57) (101/68 - 117/58)  RR: 16 (2018 04:57) (16 - 18)  SpO2: 93% (2018 04:57) (92% - 100%)    ________________________________________________  PHYSICAL EXAM:  GENERAL: NAD  HEENT:Normocephalic; pale conjunctivae, sclerae clear  NECK : supple  CHEST/LUNG: Clear to auscultation bilaterally with good air entry   HEART: S1 S2  regular; no murmurs, gallops or rubs  ABDOMEN: Soft, Nontender, Nondistended; Bowel sounds present  EXTREMITIES: no cyanosis; no edema; no calf tenderness  NERVOUS SYSTEM:  Awake and alert; Oriented  to place, person and time  _________________________________________________  LABS:                        6.2    20.0  )-----------( 318      ( 2018 12:06 )             20.0     04-05    127<L>  |  95<L>  |  20<H>  ----------------------------<  183<H>  4.6   |  25  |  0.80    Ca    7.6<L>      2018 07:24  Phos  4.1     -05  Mg     1.7     -    TPro  6.3  /  Alb  1.8<L>  /  TBili  0.6  /  DBili  x   /  AST  29  /  ALT  41  /  AlkPhos  167<H>  -      Urinalysis Basic - ( 2018 15:19 )    Color: Yellow / Appearance: Clear / S.010 / pH: x  Gluc: x / Ketone: Negative  / Bili: Negative / Urobili: Negative   Blood: x / Protein: Negative / Nitrite: Negative   Leuk Esterase: Trace / RBC: 2-5 /HPF / WBC 3-5 /HPF   Sq Epi: x / Non Sq Epi: Moderate /HPF / Bacteria: Few /HPF      CAPILLARY BLOOD GLUCOSE      POCT Blood Glucose.: 289 mg/dL (2018 11:32)  POCT Blood Glucose.: 211 mg/dL (2018 07:48)  POCT Blood Glucose.: 216 mg/dL (2018 00:01)  POCT Blood Glucose.: 286 mg/dL (2018 14:07)          Plan of care was discussed with patient and /or primary care giver; all questions and concerns were addressed and care was aligned with patient's wishes.

## 2018-04-05 NOTE — CONSULT NOTE ADULT - ATTENDING COMMENTS
JARROD
Patient is a 69 y/o female with PMH of HTN, DM admitted yesterday with c/o back pain. MRI shows a large left epidural abscess in the Lumbar spine ( L3-4 ). The size is ~12 x 10mm and it is causing canal stenosis of moderate to severe degree. The patient has had a leukocytosis since admission. ICU was consulted today because of coffee ground emesis and drop in HCT and BP. Systolic BP 85mmhg. The Hct dropped to as low as 18. The patient was taking naprosyn for pain and could thus have gastritis. She is receiving 2 u PRBCs and IV vancomycin. ID follow up with Dr. Menendez. BP now 96 systolic. Hospitalist has been in touch with neurosurgery who state that patient has no bowel or bladder incontinence and urgent neurosurgical not required. GI follow up with Dr. Newman.

## 2018-04-05 NOTE — CONSULT NOTE ADULT - SUBJECTIVE AND OBJECTIVE BOX
Patient is a 68y old  Female who presents with a chief complaint of back pain x2 weeks (2018 17:58)      HPI:  68F from home PMH HTN, DM, HLD who comes to hospital for 2 weeks of back pain. Daughter says that she has had longstanding back pain but in the past 2 weeks, it has worsened significantly. She is unable to walk, the pain is very severe, she feels dizzy, weakness in the bilateral LE, says she cannot balance well. She came to ED last week, had temperature of 100.1, and was discharged from the ED with outpatient follow up. Today she saw her regular doctor who noted all her complaints and told her to come to the hospital. Patient also complains of night sweats, subjective fevers at night. Denies any cough, sob, nausea, vomiting, diarrhea, abd pain. Moved here from [place] 8 years ago. Denies any bowel or bladder incontinence. (2018 17:58)    INTERVAL HISTORY: Patient Hb dropped to 6.2 this morning , 2 U of PRBC transfusion was planned . MRI of lumbar spine       Allergies    No Known Allergies    Intolerances        MEDICATIONS  (STANDING):  atorvastatin 40 milliGRAM(s) Oral at bedtime  lidocaine   Patch 1 Patch Transdermal daily  pantoprazole Infusion 8 mG/Hr (10 mL/Hr) IV Continuous <Continuous>  sodium chloride 0.9% Bolus 1000 milliLiter(s) IV Bolus once  sodium chloride 0.9%. 1000 milliLiter(s) (50 mL/Hr) IV Continuous <Continuous>  vancomycin  IVPB        MEDICATIONS  (PRN):  acetaminophen   Tablet 650 milliGRAM(s) Oral every 6 hours PRN For Temp greater than 38 C (100.4 F)  acetaminophen   Tablet. 650 milliGRAM(s) Oral every 6 hours PRN Mild Pain (1 - 3)  morphine  - Injectable 1 milliGRAM(s) IV Push every 6 hours PRN Severe Pain (7 - 10)  traMADol 50 milliGRAM(s) Oral every 8 hours PRN Moderate Pain (4 - 6)      Daily     Daily Weight in k.9 (2018 23:49)    Drug Dosing Weight  Height (cm): 157.48 (2018 14:09)  Weight (kg): 62.6 (2018 14:09)  BMI (kg/m2): 25.2 (2018 14:09)  BSA (m2): 1.63 (2018 14:09)    PAST MEDICAL & SURGICAL HISTORY:  Essential hypertension  Type 2 diabetes mellitus without complication  HLD (hyperlipidemia)  H/O tubal ligation      FAMILY HISTORY:  No pertinent family history in first degree relatives      SOCIAL HISTORY:    ADVANCE DIRECTIVES:    REVIEW OF SYSTEMS:    CONSTITUTIONAL: No fever, weight loss, or fatigue  EYES: No eye pain, visual disturbances, or discharge  ENMT:  No difficulty hearing, tinnitus, vertigo; No sinus or throat pain  NECK: No pain or stiffness  BREASTS: No pain, masses, or nipple discharge  RESPIRATORY: No cough, wheezing, chills or hemoptysis; No shortness of breath  CARDIOVASCULAR: No chest pain, palpitations, dizziness, or leg swelling  GASTROINTESTINAL: No abdominal or epigastric pain. No nausea, vomiting, or hematemesis; No diarrhea or constipation. No melena or hematochezia.  GENITOURINARY: No dysuria, frequency, hematuria, or incontinence  NEUROLOGICAL: No headaches, memory loss, loss of strength, numbness, or tremors  SKIN: No itching, burning, rashes, or lesions   LYMPH NODES: No enlarged glands  ENDOCRINE: No heat or cold intolerance; No hair loss  MUSCULOSKELETAL: No joint pain or swelling; No muscle, back, or extremity pain  PSYCHIATRIC: No depression, anxiety, mood swings, or difficulty sleeping  HEME/LYMPH: No easy bruising, or bleeding gums  ALLERGY AND IMMUNOLOGIC: No hives or eczema          ICU Vital Signs Last 24 Hrs  T(C): 36.9 (2018 20:17), Max: 36.9 (2018 20:17)  T(F): 98.5 (2018 20:17), Max: 98.5 (2018 20:17)  HR: 105 (2018 20:17) (90 - 105)  BP: 107/57 (2018 20:17) (107/57 - 121/55)  BP(mean): --  ABP: --  ABP(mean): --  RR: 17 (2018 20:17) (16 - 17)  SpO2: 98% (2018 20:17) (93% - 98%)          I&O's Detail      PHYSICAL EXAM:    GENERAL: NAD, well-groomed, well-developed  HEAD:  Atraumatic, Normocephalic  EYES: EOMI, PERRLA, conjunctiva and sclera clear  ENMT: No tonsillar erythema, exudates, or enlargement; Moist mucous membranes, Good dentition, No lesions  NECK: Supple, No JVD, Normal thyroid  NERVOUS SYSTEM:  Alert & Oriented X3, Good concentration; Motor Strength 5/5 B/L upper and lower extremities; DTRs 2+ intact and symmetric  CHEST/LUNG: Clear to percussion bilaterally; No rales, rhonchi, wheezing, or rubs  HEART: Regular rate and rhythm; No murmurs, rubs, or gallops  ABDOMEN: Soft, Nontender, Nondistended; Bowel sounds present  EXTREMITIES:  2+ Peripheral Pulses, No clubbing, cyanosis, or edema  LYMPH: No lymphadenopathy noted  SKIN: No rashes or lesions    LABS:  CBC Full  -  ( 2018 12:06 )  WBC Count : 20.0 K/uL  Hemoglobin : 6.2 g/dL  Hematocrit : 20.0 %  Platelet Count - Automated : 318 K/uL  Mean Cell Volume : 92.5 fl  Mean Cell Hemoglobin : 28.8 pg  Mean Cell Hemoglobin Concentration : 31.2 gm/dL  Auto Neutrophil # : x  Auto Lymphocyte # : x  Auto Monocyte # : x  Auto Eosinophil # : x  Auto Basophil # : x  Auto Neutrophil % : x  Auto Lymphocyte % : x  Auto Monocyte % : x  Auto Eosinophil % : x  Auto Basophil % : x    04-05    127<L>  |  95<L>  |  20<H>  ----------------------------<  183<H>  4.6   |  25  |  0.80    Ca    7.6<L>      2018 07:24  Phos  4.1     04-05  Mg     1.7     -05    TPro  5.3<L>  /  Alb  x   /  TBili  x   /  DBili  x   /  AST  x   /  ALT  x   /  AlkPhos  x   04-05    CAPILLARY BLOOD GLUCOSE      POCT Blood Glucose.: 228 mg/dL (2018 16:53)      Urinalysis Basic - ( 2018 15:19 )    Color: Yellow / Appearance: Clear / S.010 / pH: x  Gluc: x / Ketone: Negative  / Bili: Negative / Urobili: Negative   Blood: x / Protein: Negative / Nitrite: Negative   Leuk Esterase: Trace / RBC: 2-5 /HPF / WBC 3-5 /HPF   Sq Epi: x / Non Sq Epi: Moderate /HPF / Bacteria: Few /HPF          Culture Results:   10,000 - 49,000 CFU/mL Enterococcus faecalis ( @ 22:40)      EKG:    ECHO, US:    RADIOLOGY:    CRITICAL CARE TIME SPENT: Patient is a 68y old  Female who presents with a chief complaint of back pain x2 weeks (2018 17:58)      HPI:  68F from home PMH HTN, DM, HLD who comes to hospital for 2 weeks of back pain. Daughter says that she has had longstanding back pain but in the past 2 weeks, it has worsened significantly. She is unable to walk, the pain is very severe, she feels dizzy, weakness in the bilateral LE, says she cannot balance well. She came to ED last week, had temperature of 100.1, and was discharged from the ED with outpatient follow up. Today she saw her regular doctor who noted all her complaints and told her to come to the hospital. Patient also complains of night sweats, subjective fevers at night. Denies any cough, sob, nausea, vomiting, diarrhea, abd pain. [place] 8 years ago. Denies any bowel or bladder incontinence. (2018 17:58)    INTERVAL HISTORY: Patient Hb dropped to 6.2 this morning , 2 U of PRBC transfusion was planned . MRI of lumbar spine was done showed < from: MR Lumbar Spine w/ IV Cont (18 @ 20:12) >   Large left posterior epidural abscess collection is seen in mid   lumbar   spine measuring up to 12 mm in AP dimension and 9.4 cm in craniocaudal   dimension mostly centered at L2 and L3 vertebral levels extending to mid   L4   vertebral level. There is moderate to severe canal stenosis secondary to   mass   effect from this lesion.    < end of copied text >  < from: MR Lumbar Spine w/ IV Cont (18 @ 20:12) >  discitis and   osteophytosis with likely small anterior epidural early abscess   collection.    < end of copied text >        Allergies    No Known Allergies    Intolerances        MEDICATIONS  (STANDING):  atorvastatin 40 milliGRAM(s) Oral at bedtime  lidocaine   Patch 1 Patch Transdermal daily  pantoprazole Infusion 8 mG/Hr (10 mL/Hr) IV Continuous <Continuous>  sodium chloride 0.9% Bolus 1000 milliLiter(s) IV Bolus once  sodium chloride 0.9%. 1000 milliLiter(s) (50 mL/Hr) IV Continuous <Continuous>  vancomycin  IVPB        MEDICATIONS  (PRN):  acetaminophen   Tablet 650 milliGRAM(s) Oral every 6 hours PRN For Temp greater than 38 C (100.4 F)  acetaminophen   Tablet. 650 milliGRAM(s) Oral every 6 hours PRN Mild Pain (1 - 3)  morphine  - Injectable 1 milliGRAM(s) IV Push every 6 hours PRN Severe Pain (7 - 10)  traMADol 50 milliGRAM(s) Oral every 8 hours PRN Moderate Pain (4 - 6)      Daily     Daily Weight in k.9 (2018 23:49)    Drug Dosing Weight  Height (cm): 157.48 (2018 14:09)  Weight (kg): 62.6 (2018 14:09)  BMI (kg/m2): 25.2 (2018 14:09)  BSA (m2): 1.63 (2018 14:09)    PAST MEDICAL & SURGICAL HISTORY:  Essential hypertension  Type 2 diabetes mellitus without complication  HLD (hyperlipidemia)  H/O tubal ligation      FAMILY HISTORY:  No pertinent family history in first degree relatives      SOCIAL HISTORY:    ADVANCE DIRECTIVES:    REVIEW OF SYSTEMS:    CONSTITUTIONAL: No fever, weight loss, or fatigue  EYES: No eye pain, visual disturbances, or discharge  ENMT:  No difficulty hearing, tinnitus, vertigo; No sinus or throat pain  NECK: No pain or stiffness  BREASTS: No pain, masses, or nipple discharge  RESPIRATORY: No cough, wheezing, chills or hemoptysis; No shortness of breath  CARDIOVASCULAR: No chest pain, palpitations, dizziness, or leg swelling  GASTROINTESTINAL: No abdominal or epigastric pain. No nausea, vomiting, or hematemesis; No diarrhea or constipation. No melena or hematochezia.  GENITOURINARY: No dysuria, frequency, hematuria, or incontinence  NEUROLOGICAL: No headaches, memory loss, loss of strength, numbness, or tremors  SKIN: No itching, burning, rashes, or lesions   LYMPH NODES: No enlarged glands  ENDOCRINE: No heat or cold intolerance; No hair loss  MUSCULOSKELETAL: No joint pain or swelling; No muscle, back, or extremity pain  PSYCHIATRIC: No depression, anxiety, mood swings, or difficulty sleeping  HEME/LYMPH: No easy bruising, or bleeding gums  ALLERGY AND IMMUNOLOGIC: No hives or eczema          ICU Vital Signs Last 24 Hrs  T(C): 36.9 (2018 20:17), Max: 36.9 (2018 20:17)  T(F): 98.5 (2018 20:17), Max: 98.5 (2018 20:17)  HR: 105 (2018 20:17) (90 - 105)  BP: 107/57 (2018 20:17) (107/57 - 121/55)  BP(mean): --  ABP: --  ABP(mean): --  RR: 17 (2018 20:17) (16 - 17)  SpO2: 98% (2018 20:17) (93% - 98%)          I&O's Detail      PHYSICAL EXAM:    GENERAL: NAD, well-groomed, well-developed  HEAD:  Atraumatic, Normocephalic  EYES: EOMI, PERRLA, conjunctiva and sclera clear  ENMT: No tonsillar erythema, exudates, or enlargement; Moist mucous membranes, Good dentition, No lesions  NECK: Supple, No JVD, Normal thyroid  NERVOUS SYSTEM:  Alert & Oriented X3, Good concentration; Motor Strength 5/5 B/L upper and lower extremities; DTRs 2+ intact and symmetric  CHEST/LUNG: Clear to percussion bilaterally; No rales, rhonchi, wheezing, or rubs  HEART: Regular rate and rhythm; No murmurs, rubs, or gallops  ABDOMEN: Soft, Nontender, Nondistended; Bowel sounds present  EXTREMITIES:  2+ Peripheral Pulses, No clubbing, cyanosis, or edema  LYMPH: No lymphadenopathy noted  SKIN: No rashes or lesions    LABS:  CBC Full  -  ( 2018 12:06 )  WBC Count : 20.0 K/uL  Hemoglobin : 6.2 g/dL  Hematocrit : 20.0 %  Platelet Count - Automated : 318 K/uL  Mean Cell Volume : 92.5 fl  Mean Cell Hemoglobin : 28.8 pg  Mean Cell Hemoglobin Concentration : 31.2 gm/dL  Auto Neutrophil # : x  Auto Lymphocyte # : x  Auto Monocyte # : x  Auto Eosinophil # : x  Auto Basophil # : x  Auto Neutrophil % : x  Auto Lymphocyte % : x  Auto Monocyte % : x  Auto Eosinophil % : x  Auto Basophil % : x    04-05    127<L>  |  95<L>  |  20<H>  ----------------------------<  183<H>  4.6   |  25  |  0.80    Ca    7.6<L>      2018 07:24  Phos  4.1     04-05  Mg     1.7     04-05    TPro  5.3<L>  /  Alb  x   /  TBili  x   /  DBili  x   /  AST  x   /  ALT  x   /  AlkPhos  x       CAPILLARY BLOOD GLUCOSE      POCT Blood Glucose.: 228 mg/dL (2018 16:53)      Urinalysis Basic - ( 2018 15:19 )    Color: Yellow / Appearance: Clear / S.010 / pH: x  Gluc: x / Ketone: Negative  / Bili: Negative / Urobili: Negative   Blood: x / Protein: Negative / Nitrite: Negative   Leuk Esterase: Trace / RBC: 2-5 /HPF / WBC 3-5 /HPF   Sq Epi: x / Non Sq Epi: Moderate /HPF / Bacteria: Few /HPF          Culture Results:   10,000 - 49,000 CFU/mL Enterococcus faecalis ( @ 22:40)      EKG:    ECHO, US:    RADIOLOGY:    CRITICAL CARE TIME SPENT: Patient is a 68y old  Female who presents with a chief complaint of back pain x2 weeks (2018 17:58)      HPI:  68F from home PMH HTN, DM, HLD who comes to hospital for 2 weeks of back pain. Daughter says that she has had longstanding back pain but in the past 2 weeks, it has worsened significantly. She is unable to walk, the pain is very severe, she feels dizzy, weakness in the bilateral LE, says she cannot balance well. She came to ED last week, had temperature of 100.1, and was discharged from the ED with outpatient follow up. Today she saw her regular doctor who noted all her complaints and told her to come to the hospital. Patient also complains of night sweats, subjective fevers at night. Denies any cough, sob, nausea, vomiting, diarrhea, abd pain. [place]. Denies any bowel or bladder incontinence. (2018 17:58)    INTERVAL HISTORY: Patient Hb dropped to 6.2 this morning , 2 U of PRBC transfusion was planned . MRI of lumbar spine was done showed Large left posterior epidural abscess collection is seen in mid lumbar spine measuring up to 12 mm in AP dimension and 9.4 cm in craniocaudal dimension mostly centered at L2 and L3 vertebral levels extending to mid L4 vertebral level. There is moderate to severe canal stenosis secondary to mass effect from this lesion. discitis and osteophytosis with likely small anterior epidural early abscess   collection.  Patient received 1 U PRBC , had one episode of coffee ground emesis. Patient complained of feeling weak and dizzy. Patient also reported having injection in the spine last Monday. She has been taking Naproxen since last week for back pain.           Allergies    No Known Allergies    Intolerances        MEDICATIONS  (STANDING):  atorvastatin 40 milliGRAM(s) Oral at bedtime  lidocaine   Patch 1 Patch Transdermal daily  pantoprazole Infusion 8 mG/Hr (10 mL/Hr) IV Continuous <Continuous>  sodium chloride 0.9% Bolus 1000 milliLiter(s) IV Bolus once  sodium chloride 0.9%. 1000 milliLiter(s) (50 mL/Hr) IV Continuous <Continuous>  vancomycin  IVPB        MEDICATIONS  (PRN):  acetaminophen   Tablet 650 milliGRAM(s) Oral every 6 hours PRN For Temp greater than 38 C (100.4 F)  acetaminophen   Tablet. 650 milliGRAM(s) Oral every 6 hours PRN Mild Pain (1 - 3)  morphine  - Injectable 1 milliGRAM(s) IV Push every 6 hours PRN Severe Pain (7 - 10)  traMADol 50 milliGRAM(s) Oral every 8 hours PRN Moderate Pain (4 - 6)      Daily     Daily Weight in k.9 (2018 23:49)    Drug Dosing Weight  Height (cm): 157.48 (2018 14:09)  Weight (kg): 62.6 (2018 14:09)  BMI (kg/m2): 25.2 (2018 14:09)  BSA (m2): 1.63 (2018 14:09)    PAST MEDICAL & SURGICAL HISTORY:  Essential hypertension  Type 2 diabetes mellitus without complication  HLD (hyperlipidemia)  H/O tubal ligation      FAMILY HISTORY:  No pertinent family history in first degree relatives        REVIEW OF SYSTEMS:    CONSTITUTIONAL: No fever, weight loss, or fatigue  EYES: No eye pain, visual disturbances, or discharge  ENMT:  No difficulty hearing, tinnitus, vertigo; No sinus or throat pain  NECK: No pain or stiffness  BREASTS: No pain, masses, or nipple discharge  RESPIRATORY: No cough, wheezing, chills or hemoptysis; No shortness of breath  CARDIOVASCULAR: No chest pain, palpitations, or leg swelling  GASTROINTESTINAL: as per HPI   GENITOURINARY: No dysuria, frequency, hematuria, or incontinence  NEUROLOGICAL: As per HPI         Vital Signs Last 24 Hrs  T(C): 36.9 (2018 20:17), Max: 36.9 (2018 20:17)  T(F): 98.5 (2018 20:17), Max: 98.5 (2018 20:17)  HR: 105 (2018 20:17) (90 - 105)  BP: 107/57 (2018 20:17) (107/57 - 121/55)  BP(mean): --  ABP: --  ABP(mean): --  RR: 17 (2018 20:17) (16 - 17)  SpO2: 98% (2018 20:17) (93% - 98%)          I&O's Detail      PHYSICAL EXAM:    GENERAL: NAD, pale looking   NECK: Supple, No JVD  NERVOUS SYSTEM:  Alert & Oriented X3, no focal neurological deficit   CHEST/LUNG: Clear to percussion bilaterally; No rales, rhonchi, wheezing, or rubs  HEART: Regular rate and rhythm; No murmurs, rubs, or gallops  ABDOMEN: Epigastric tenderness, soft, no distension   EXTREMITIES:  2+ Peripheral Pulses, No clubbing, cyanosis, or edema      LABS:  CBC Full  -  ( 2018 12:06 )  WBC Count : 20.0 K/uL  Hemoglobin : 6.2 g/dL  Hematocrit : 20.0 %  Platelet Count - Automated : 318 K/uL  Mean Cell Volume : 92.5 fl  Mean Cell Hemoglobin : 28.8 pg  Mean Cell Hemoglobin Concentration : 31.2 gm/dL  Auto Neutrophil # : x  Auto Lymphocyte # : x  Auto Monocyte # : x  Auto Eosinophil # : x  Auto Basophil # : x  Auto Neutrophil % : x  Auto Lymphocyte % : x  Auto Monocyte % : x  Auto Eosinophil % : x  Auto Basophil % : x    -    127<L>  |  95<L>  |  20<H>  ----------------------------<  183<H>  4.6   |  25  |  0.80    Ca    7.6<L>      2018 07:24  Phos  4.1     04-05  Mg     1.7     -05    TPro  5.3<L>  /  Alb  x   /  TBili  x   /  DBili  x   /  AST  x   /  ALT  x   /  AlkPhos  x   -05    CAPILLARY BLOOD GLUCOSE      POCT Blood Glucose.: 228 mg/dL (2018 16:53)      Urinalysis Basic - ( 2018 15:19 )    Color: Yellow / Appearance: Clear / S.010 / pH: x  Gluc: x / Ketone: Negative  / Bili: Negative / Urobili: Negative   Blood: x / Protein: Negative / Nitrite: Negative   Leuk Esterase: Trace / RBC: 2-5 /HPF / WBC 3-5 /HPF   Sq Epi: x / Non Sq Epi: Moderate /HPF / Bacteria: Few /HPF          Culture Results:   10,000 - 49,000 CFU/mL Enterococcus faecalis ( @ 22:40)      EKG:    ECHO, US:    RADIOLOGY:    CRITICAL CARE TIME SPENT:

## 2018-04-05 NOTE — CONSULT NOTE ADULT - PROBLEM SELECTOR RECOMMENDATION 9
NO SIGNS OF GI BLEEDING     MONITOR CBC  TRANSFUSE AS NEEDED    F/U IMAGING STUDIES    RECALL IF MELENA, RECTAL BLEEDING OR HEMATEMESIS OBSERVED
likely secondary to NSAID use   s/p 1 L bolus and 1 U PRBC   continue with second U PRBC   Repeat CBC post intubation   Monitor closely in ICU   Pt has two peripheral 20 G IV lines   continue with protonix infusion   Keep NPO   Discussed with Dr. Andres GI , no plan of EGD , hemodynamic stabilization and serial CBC

## 2018-04-05 NOTE — CONSULT NOTE ADULT - PROBLEM SELECTOR RECOMMENDATION 2
Neurosurgery was consulted , recommended IR guided drainage   Continue with Vancomycin   ID Dr. Bowers

## 2018-04-05 NOTE — CONSULT NOTE ADULT - SUBJECTIVE AND OBJECTIVE BOX
Patient is a 68y old  Female who presents with a chief complaint of back pain x2 weeks (04 Apr 2018 17:58)    HPI:  68F from home PMH HTN, DM, HLD who comes to hospital for 2 weeks of back pain. Daughter says that she has had longstanding back pain but in the past 2 weeks, it has worsened significantly. She is unable to walk, the pain is very severe, she feels dizzy, weakness in the bilateral LE, says she cannot balance well. She came to ED last week, had temperature of 100.1, and was discharged from the ED with outpatient follow up. Today she saw her regular doctor who noted all her complaints and told her to come to the hospital. Patient also complains of night sweats, subjective fevers at night. Denies any cough, sob, nausea, vomiting, diarrhea, abd pain. Moved here from  8 years ago. Denies any bowel or bladder incontinence. (04 Apr 2018 17:58)    today : denies abdominal pain, nausea or vomiting.  no h/o hematemesis, no melena or rectal bleeding     PAST MEDICAL & SURGICAL HISTORY:  Essential hypertension  Type 2 diabetes mellitus without complication  HLD (hyperlipidemia)  H/O tubal ligation    MEDICATIONS  (STANDING):  atorvastatin 40 milliGRAM(s) Oral at bedtime  docusate sodium 100 milliGRAM(s) Oral two times a day  lidocaine   Patch 1 Patch Transdermal daily  losartan 100 milliGRAM(s) Oral daily  pantoprazole  Injectable 40 milliGRAM(s) IV Push every 12 hours  senna 2 Tablet(s) Oral at bedtime  sodium chloride 0.9%. 1000 milliLiter(s) (50 mL/Hr) IV Continuous <Continuous>  vancomycin  IVPB                              6.2    20.0  )-----------( 318      ( 05 Apr 2018 12:06 )             20.0     04-05    127<L>  |  95<L>  |  20<H>  ----------------------------<  183<H>  4.6   |  25  |  0.80    Ca    7.6<L>      05 Apr 2018 07:24  Phos  4.1     04-05  Mg     1.7     04-05    TPro  5.3<L>  /  Alb  x   /  TBili  x   /  DBili  x   /  AST  x   /  ALT  x   /  AlkPhos  x   04-05

## 2018-04-05 NOTE — CONSULT NOTE ADULT - ASSESSMENT
68F from home PMH HTN, DM, HLD who comes to hospital for 2 weeks of back pain. Patient dropped Hb , and had one episode of coffee ground emesis . Pt received 1 U PRBC , Hb remained 5.9 after 1 U PRBC. Pt blood pressure dropped to 89/54. ICU was consulted for the concern of hypotension in the setting of GI bleed.

## 2018-04-05 NOTE — PROGRESS NOTE ADULT - PROBLEM SELECTOR PLAN 2
patient complains primarily of severe back pain radiating to rest of her body  -f/u MRI for cord compression vs discitis  f/u flow cytometry, protein electrophoresis for possible malignancy  -monitor for improvement  - tramadol for now PRN

## 2018-04-05 NOTE — CONSULT NOTE ADULT - ASSESSMENT
RULE OUT MULTIPLE MYELOMA:  Await protein electrophoresis and imaging  Recommend to check Free Kappa lambda ration, B2 Mircoglobulin, LDH  Check for 24 hour urine for Bence Mullins, immunofixation, and Creatinine clearance    LEUCOCYTOSIS  with left shift. Fever, anemia and back pain  Rule out infection. If no abcess or osteo seen , there is high risk of malignancy    RULE OUT MULTIPLE MYELOMA:  Await protein electrophoresis and imaging  Recommend to check Free Kappa lambda ration, B2 Mircoglobulin, LDH  Check for 24 hour urine for Bence Mullins, immunofixation, and Creatinine clearance    LEUCOCYTOSIS  with left shift. on antibiotics for now    Nasir Gondal  [phone]

## 2018-04-05 NOTE — PROGRESS NOTE ADULT - PROBLEM SELECTOR PLAN 1
patient states having syncopal episode last week  may be related to anemia  patient placed on telemetry, f/u echo  h/h this AM is 6.3/18  ordered 2 units PRBC  f/u reticulocyte, haptoglobin, LDH, peripheral smear, guaiac  f/u heme/onc Dr. Butler  f/u GI - Dr. Newman

## 2018-04-06 ENCOUNTER — RESULT REVIEW (OUTPATIENT)
Age: 68
End: 2018-04-06

## 2018-04-06 DIAGNOSIS — D62 ACUTE POSTHEMORRHAGIC ANEMIA: ICD-10-CM

## 2018-04-06 LAB
-  AMPICILLIN: SIGNIFICANT CHANGE UP
-  CIPROFLOXACIN: SIGNIFICANT CHANGE UP
-  NITROFURANTOIN: SIGNIFICANT CHANGE UP
-  TETRACYCLINE: SIGNIFICANT CHANGE UP
-  VANCOMYCIN: SIGNIFICANT CHANGE UP
ANION GAP SERPL CALC-SCNC: 7 MMOL/L — SIGNIFICANT CHANGE UP (ref 5–17)
ANION GAP SERPL CALC-SCNC: 9 MMOL/L — SIGNIFICANT CHANGE UP (ref 5–17)
BUN SERPL-MCNC: 18 MG/DL — SIGNIFICANT CHANGE UP (ref 7–18)
BUN SERPL-MCNC: 30 MG/DL — HIGH (ref 7–18)
CALCIUM SERPL-MCNC: 6.6 MG/DL — LOW (ref 8.4–10.5)
CALCIUM SERPL-MCNC: 6.8 MG/DL — LOW (ref 8.4–10.5)
CHLORIDE SERPL-SCNC: 102 MMOL/L — SIGNIFICANT CHANGE UP (ref 96–108)
CHLORIDE SERPL-SCNC: 105 MMOL/L — SIGNIFICANT CHANGE UP (ref 96–108)
CO2 SERPL-SCNC: 21 MMOL/L — LOW (ref 22–31)
CO2 SERPL-SCNC: 23 MMOL/L — SIGNIFICANT CHANGE UP (ref 22–31)
CREAT SERPL-MCNC: 0.58 MG/DL — SIGNIFICANT CHANGE UP (ref 0.5–1.3)
CREAT SERPL-MCNC: 0.67 MG/DL — SIGNIFICANT CHANGE UP (ref 0.5–1.3)
CULTURE RESULTS: SIGNIFICANT CHANGE UP
GLUCOSE SERPL-MCNC: 131 MG/DL — HIGH (ref 70–99)
GLUCOSE SERPL-MCNC: 143 MG/DL — HIGH (ref 70–99)
HCT VFR BLD CALC: 21.1 % — LOW (ref 34.5–45)
HCT VFR BLD CALC: 22.3 % — LOW (ref 34.5–45)
HCT VFR BLD CALC: 24.3 % — LOW (ref 34.5–45)
HCT VFR BLD CALC: 28.3 % — LOW (ref 34.5–45)
HGB BLD-MCNC: 6.8 G/DL — CRITICAL LOW (ref 11.5–15.5)
HGB BLD-MCNC: 7.3 G/DL — LOW (ref 11.5–15.5)
HGB BLD-MCNC: 7.9 G/DL — LOW (ref 11.5–15.5)
HGB BLD-MCNC: 9.5 G/DL — LOW (ref 11.5–15.5)
INR BLD: 1.39 RATIO — HIGH (ref 0.88–1.16)
LACTATE SERPL-SCNC: 0.8 MMOL/L — SIGNIFICANT CHANGE UP (ref 0.7–2)
MCHC RBC-ENTMCNC: 28.8 PG — SIGNIFICANT CHANGE UP (ref 27–34)
MCHC RBC-ENTMCNC: 29.5 PG — SIGNIFICANT CHANGE UP (ref 27–34)
MCHC RBC-ENTMCNC: 29.6 PG — SIGNIFICANT CHANGE UP (ref 27–34)
MCHC RBC-ENTMCNC: 29.8 PG — SIGNIFICANT CHANGE UP (ref 27–34)
MCHC RBC-ENTMCNC: 32.4 GM/DL — SIGNIFICANT CHANGE UP (ref 32–36)
MCHC RBC-ENTMCNC: 32.6 GM/DL — SIGNIFICANT CHANGE UP (ref 32–36)
MCHC RBC-ENTMCNC: 32.8 GM/DL — SIGNIFICANT CHANGE UP (ref 32–36)
MCHC RBC-ENTMCNC: 33.7 GM/DL — SIGNIFICANT CHANGE UP (ref 32–36)
MCV RBC AUTO: 87.6 FL — SIGNIFICANT CHANGE UP (ref 80–100)
MCV RBC AUTO: 88.3 FL — SIGNIFICANT CHANGE UP (ref 80–100)
MCV RBC AUTO: 90.8 FL — SIGNIFICANT CHANGE UP (ref 80–100)
MCV RBC AUTO: 91.5 FL — SIGNIFICANT CHANGE UP (ref 80–100)
METHOD TYPE: SIGNIFICANT CHANGE UP
ORGANISM # SPEC MICROSCOPIC CNT: SIGNIFICANT CHANGE UP
ORGANISM # SPEC MICROSCOPIC CNT: SIGNIFICANT CHANGE UP
PLATELET # BLD AUTO: 239 K/UL — SIGNIFICANT CHANGE UP (ref 150–400)
PLATELET # BLD AUTO: 277 K/UL — SIGNIFICANT CHANGE UP (ref 150–400)
PLATELET # BLD AUTO: 277 K/UL — SIGNIFICANT CHANGE UP (ref 150–400)
PLATELET # BLD AUTO: 300 K/UL — SIGNIFICANT CHANGE UP (ref 150–400)
POTASSIUM SERPL-MCNC: 4.1 MMOL/L — SIGNIFICANT CHANGE UP (ref 3.5–5.3)
POTASSIUM SERPL-MCNC: 4.5 MMOL/L — SIGNIFICANT CHANGE UP (ref 3.5–5.3)
POTASSIUM SERPL-SCNC: 4.1 MMOL/L — SIGNIFICANT CHANGE UP (ref 3.5–5.3)
POTASSIUM SERPL-SCNC: 4.5 MMOL/L — SIGNIFICANT CHANGE UP (ref 3.5–5.3)
PROTHROM AB SERPL-ACNC: 15.2 SEC — HIGH (ref 9.8–12.7)
RBC # BLD: 2.31 M/UL — LOW (ref 3.8–5.2)
RBC # BLD: 2.46 M/UL — LOW (ref 3.8–5.2)
RBC # BLD: 2.76 M/UL — LOW (ref 3.8–5.2)
RBC # BLD: 3.24 M/UL — LOW (ref 3.8–5.2)
RBC # FLD: 12.9 % — SIGNIFICANT CHANGE UP (ref 10.3–14.5)
RBC # FLD: 13.1 % — SIGNIFICANT CHANGE UP (ref 10.3–14.5)
RBC # FLD: 13.6 % — SIGNIFICANT CHANGE UP (ref 10.3–14.5)
RBC # FLD: 13.8 % — SIGNIFICANT CHANGE UP (ref 10.3–14.5)
SODIUM SERPL-SCNC: 132 MMOL/L — LOW (ref 135–145)
SODIUM SERPL-SCNC: 135 MMOL/L — SIGNIFICANT CHANGE UP (ref 135–145)
SPECIMEN SOURCE: SIGNIFICANT CHANGE UP
VANCOMYCIN TROUGH SERPL-MCNC: 7.3 UG/ML — LOW (ref 10–20)
WBC # BLD: 14.4 K/UL — HIGH (ref 3.8–10.5)
WBC # BLD: 16.9 K/UL — HIGH (ref 3.8–10.5)
WBC # BLD: 18 K/UL — HIGH (ref 3.8–10.5)
WBC # BLD: 18.6 K/UL — HIGH (ref 3.8–10.5)
WBC # FLD AUTO: 14.4 K/UL — HIGH (ref 3.8–10.5)
WBC # FLD AUTO: 16.9 K/UL — HIGH (ref 3.8–10.5)
WBC # FLD AUTO: 18 K/UL — HIGH (ref 3.8–10.5)
WBC # FLD AUTO: 18.6 K/UL — HIGH (ref 3.8–10.5)

## 2018-04-06 PROCEDURE — 99291 CRITICAL CARE FIRST HOUR: CPT

## 2018-04-06 PROCEDURE — 88305 TISSUE EXAM BY PATHOLOGIST: CPT | Mod: 26

## 2018-04-06 PROCEDURE — 88312 SPECIAL STAINS GROUP 1: CPT | Mod: 26

## 2018-04-06 RX ORDER — MORPHINE SULFATE 50 MG/1
2 CAPSULE, EXTENDED RELEASE ORAL EVERY 4 HOURS
Qty: 0 | Refills: 0 | Status: DISCONTINUED | OUTPATIENT
Start: 2018-04-06 | End: 2018-04-06

## 2018-04-06 RX ORDER — SODIUM CHLORIDE 9 MG/ML
500 INJECTION INTRAMUSCULAR; INTRAVENOUS; SUBCUTANEOUS ONCE
Qty: 0 | Refills: 0 | Status: COMPLETED | OUTPATIENT
Start: 2018-04-06 | End: 2018-04-06

## 2018-04-06 RX ORDER — CALCIUM GLUCONATE 100 MG/ML
2 VIAL (ML) INTRAVENOUS ONCE
Qty: 0 | Refills: 0 | Status: COMPLETED | OUTPATIENT
Start: 2018-04-06 | End: 2018-04-06

## 2018-04-06 RX ORDER — CEFEPIME 1 G/1
INJECTION, POWDER, FOR SOLUTION INTRAMUSCULAR; INTRAVENOUS
Qty: 0 | Refills: 0 | Status: DISCONTINUED | OUTPATIENT
Start: 2018-04-06 | End: 2018-04-08

## 2018-04-06 RX ORDER — CHLORHEXIDINE GLUCONATE 213 G/1000ML
1 SOLUTION TOPICAL
Qty: 0 | Refills: 0 | Status: DISCONTINUED | OUTPATIENT
Start: 2018-04-06 | End: 2018-04-08

## 2018-04-06 RX ORDER — SODIUM CHLORIDE 9 MG/ML
1000 INJECTION, SOLUTION INTRAVENOUS
Qty: 0 | Refills: 0 | Status: DISCONTINUED | OUTPATIENT
Start: 2018-04-06 | End: 2018-04-07

## 2018-04-06 RX ORDER — CEFEPIME 1 G/1
2000 INJECTION, POWDER, FOR SOLUTION INTRAMUSCULAR; INTRAVENOUS ONCE
Qty: 0 | Refills: 0 | Status: COMPLETED | OUTPATIENT
Start: 2018-04-06 | End: 2018-04-06

## 2018-04-06 RX ORDER — MORPHINE SULFATE 50 MG/1
1 CAPSULE, EXTENDED RELEASE ORAL EVERY 6 HOURS
Qty: 0 | Refills: 0 | Status: DISCONTINUED | OUTPATIENT
Start: 2018-04-06 | End: 2018-04-07

## 2018-04-06 RX ORDER — ACETAMINOPHEN 500 MG
650 TABLET ORAL EVERY 6 HOURS
Qty: 0 | Refills: 0 | Status: DISCONTINUED | OUTPATIENT
Start: 2018-04-06 | End: 2018-04-07

## 2018-04-06 RX ORDER — MORPHINE SULFATE 50 MG/1
2 CAPSULE, EXTENDED RELEASE ORAL EVERY 4 HOURS
Qty: 0 | Refills: 0 | Status: DISCONTINUED | OUTPATIENT
Start: 2018-04-06 | End: 2018-04-08

## 2018-04-06 RX ORDER — CEFEPIME 1 G/1
2000 INJECTION, POWDER, FOR SOLUTION INTRAMUSCULAR; INTRAVENOUS EVERY 8 HOURS
Qty: 0 | Refills: 0 | Status: DISCONTINUED | OUTPATIENT
Start: 2018-04-07 | End: 2018-04-08

## 2018-04-06 RX ORDER — SODIUM CHLORIDE 9 MG/ML
1000 INJECTION INTRAMUSCULAR; INTRAVENOUS; SUBCUTANEOUS ONCE
Qty: 0 | Refills: 0 | Status: COMPLETED | OUTPATIENT
Start: 2018-04-06 | End: 2018-04-06

## 2018-04-06 RX ADMIN — LIDOCAINE 1 PATCH: 4 CREAM TOPICAL at 20:03

## 2018-04-06 RX ADMIN — SODIUM CHLORIDE 1500 MILLILITER(S): 9 INJECTION INTRAMUSCULAR; INTRAVENOUS; SUBCUTANEOUS at 02:32

## 2018-04-06 RX ADMIN — SODIUM CHLORIDE 50 MILLILITER(S): 9 INJECTION INTRAMUSCULAR; INTRAVENOUS; SUBCUTANEOUS at 00:20

## 2018-04-06 RX ADMIN — Medication 650 MILLIGRAM(S): at 18:00

## 2018-04-06 RX ADMIN — MORPHINE SULFATE 2 MILLIGRAM(S): 50 CAPSULE, EXTENDED RELEASE ORAL at 20:29

## 2018-04-06 RX ADMIN — SODIUM CHLORIDE 1000 MILLILITER(S): 9 INJECTION INTRAMUSCULAR; INTRAVENOUS; SUBCUTANEOUS at 17:33

## 2018-04-06 RX ADMIN — CEFEPIME 100 MILLIGRAM(S): 1 INJECTION, POWDER, FOR SOLUTION INTRAMUSCULAR; INTRAVENOUS at 20:02

## 2018-04-06 RX ADMIN — SODIUM CHLORIDE 2000 MILLILITER(S): 9 INJECTION INTRAMUSCULAR; INTRAVENOUS; SUBCUTANEOUS at 06:46

## 2018-04-06 RX ADMIN — PANTOPRAZOLE SODIUM 10 MG/HR: 20 TABLET, DELAYED RELEASE ORAL at 20:02

## 2018-04-06 RX ADMIN — MORPHINE SULFATE 1 MILLIGRAM(S): 50 CAPSULE, EXTENDED RELEASE ORAL at 14:44

## 2018-04-06 RX ADMIN — Medication 250 MILLIGRAM(S): at 18:34

## 2018-04-06 RX ADMIN — SODIUM CHLORIDE 3000 MILLILITER(S): 9 INJECTION INTRAMUSCULAR; INTRAVENOUS; SUBCUTANEOUS at 01:56

## 2018-04-06 RX ADMIN — MORPHINE SULFATE 1 MILLIGRAM(S): 50 CAPSULE, EXTENDED RELEASE ORAL at 15:00

## 2018-04-06 RX ADMIN — MORPHINE SULFATE 1 MILLIGRAM(S): 50 CAPSULE, EXTENDED RELEASE ORAL at 07:23

## 2018-04-06 RX ADMIN — PANTOPRAZOLE SODIUM 10 MG/HR: 20 TABLET, DELAYED RELEASE ORAL at 11:48

## 2018-04-06 RX ADMIN — LIDOCAINE 1 PATCH: 4 CREAM TOPICAL at 11:46

## 2018-04-06 RX ADMIN — CHLORHEXIDINE GLUCONATE 1 APPLICATION(S): 213 SOLUTION TOPICAL at 11:48

## 2018-04-06 RX ADMIN — MORPHINE SULFATE 1 MILLIGRAM(S): 50 CAPSULE, EXTENDED RELEASE ORAL at 07:45

## 2018-04-06 RX ADMIN — Medication 250 MILLIGRAM(S): at 05:53

## 2018-04-06 RX ADMIN — MORPHINE SULFATE 2 MILLIGRAM(S): 50 CAPSULE, EXTENDED RELEASE ORAL at 20:44

## 2018-04-06 RX ADMIN — LIDOCAINE 1 PATCH: 4 CREAM TOPICAL at 00:34

## 2018-04-06 RX ADMIN — Medication 200 GRAM(S): at 20:59

## 2018-04-06 RX ADMIN — SODIUM CHLORIDE 50 MILLILITER(S): 9 INJECTION, SOLUTION INTRAVENOUS at 20:27

## 2018-04-06 NOTE — PROGRESS NOTE ADULT - PROBLEM SELECTOR PLAN 3
on protonix infusion and holding heparin - GI bleed plan for transfer to [facility] SICU once bed available. Dr. Tierney accepting physician  Added Cefepime tonight for new fever on Vanco f/u trough  new cultures sent   ID following

## 2018-04-06 NOTE — CONSULT NOTE ADULT - SUBJECTIVE AND OBJECTIVE BOX
HPI:  ID consult was called to evaluate this 69 y/o female for lumbar epidural abscess with discitis and assoc cellulitis/ myositis.  Patient has pmhx for DM, HTN and HLD. Presented to [facility] on  with c/o progressive LBP over past 2 weeks.  Found to have +MRI of L-S for large lumbar epidural abscess with discitis plus findings of lumbar cellulitis/myositis. Patient is stable and appears not to be in much pain at this time. EGD was done 2/2 dropping H&H and found to have large hemorrhagic ulcer ?malignancy. Will need transfer to tertiary facility.    As per H&P:  68F from home PMH HTN, DM, HLD who comes to hospital for 2 weeks of back pain. Daughter says that she has had longstanding back pain but in the past 2 weeks, it has worsened significantly. She is unable to walk, the pain is very severe, she feels dizzy, weakness in the bilateral LE, says she cannot balance well. She came to ED last week, had temperature of 100.1, and was discharged from the ED with outpatient follow up. Today she saw her regular doctor who noted all her complaints and told her to come to the hospital. Patient also complains of night sweats, subjective fevers at night. Denies any cough, sob, nausea, vomiting, diarrhea, abd pain. Moved [place] from [place] 8 years ago. Denies any bowel or bladder incontinence. (2018 17:58)    REVIEW OF SYSTEMS:  [  ] Not able to illicit  General: no fevers +malaise   Chest: no cough no sob no CP  GI: no nvd no abdominal pain  : no urinary symptoms   Skin: no rashes no cyanosis  Musculoskeletal: no trauma +LBP  Neuro: no ha's no dizziness     PAST MEDICAL & SURGICAL HISTORY:  Essential hypertension  Type 2 diabetes mellitus without complication  HLD (hyperlipidemia)  H/O tubal ligation    ALLERGIES: No Known Allergies    MEDS:  acetaminophen   Tablet 650 milliGRAM(s) Oral every 6 hours PRN  acetaminophen   Tablet. 650 milliGRAM(s) Oral every 6 hours PRN  atorvastatin 40 milliGRAM(s) Oral at bedtime  chlorhexidine 4% Liquid 1 Application(s) Topical <User Schedule>  lidocaine   Patch 1 Patch Transdermal daily  morphine  - Injectable 1 milliGRAM(s) IV Push every 6 hours PRN  pantoprazole Infusion 8 mG/Hr IV Continuous <Continuous>  sodium chloride 0.9%. 1000 milliLiter(s) IV Continuous <Continuous>  traMADol 50 milliGRAM(s) Oral every 8 hours PRN  vancomycin  IVPB 1000 milliGRAM(s) IV Intermittent every 12 hours    SOCIAL HISTORY:  Smoker: none    FAMILY HISTORY:  No pertinent family history in first degree relatives    VITALS:  Vital Signs Last 24 Hrs  T(C): 37 (2018 11:12), Max: 37.6 (2018 08:56)  T(F): 98.6 (2018 11:12), Max: 99.7 (2018 08:56)  HR: 89 (2018 11:12) (81 - 105)  BP: 118/68 (2018 11:12) (89/54 - 121/55)  BP(mean): 80 (2018 11:12) (57 - 86)  RR: 24 (2018 11:12) (16 - 24)  SpO2: 97% (2018 11:12) (94% - 99%)      PHYSICAL EXAM:  Constitutional: elderly female in NAD  HEENT: normocephalic with moist oral mucosa  Neck: supple no LN's no JVD  Respiratory: lungs clear no rales no rhonchi  Cardiovascular: S1 S2 reg +soft murmur  Gastrointestinal: +BS with soft, nondistended abdomen; nontender  Extremities: no edema no cyanosis  Skin: no janeway lesions no olser nodes no splinter hemorrhages  Ortho: no jt swelling  Neuro: awake and alert      LABS/DIAGNOSTIC TESTS:                        6.8    18.0  )-----------( 277      ( 2018 06:32 )             21.1     WBC Count: 18.0 K/uL ( @ 06:32)  WBC Count: 18.6 K/uL ( @ 01:47)  WBC Count: 20.2 K/uL ( @ 22:11)  WBC Count: 20.0 K/uL ( @ 12:06)  WBC Count: 18.7 K/uL ( @ 07:24)  WBC Count: 28.7 K/uL ( @ 18:45)  WBC Count: 23.4 K/uL ( @ 15:03)        132<L>  |  102  |  30<H>  ----------------------------<  143<H>  4.5   |  23  |  0.67    Ca    6.6<L>      2018 06:32  Phos  4.1     04-05  Mg     1.7     -05    TPro  5.3<L>  /  Alb  x   /  TBili  x   /  DBili  x   /  AST  x   /  ALT  x   /  AlkPhos  x   -    Urinalysis Basic - ( 2018 15:19 )    Color: Yellow / Appearance: Clear / S.010 / pH: x  Gluc: x / Ketone: Negative  / Bili: Negative / Urobili: Negative   Blood: x / Protein: Negative / Nitrite: Negative   Leuk Esterase: Trace / RBC: 2-5 /HPF / WBC 3-5 /HPF   Sq Epi: x / Non Sq Epi: Moderate /HPF / Bacteria: Few /HPF    LIVER FUNCTIONS - ( 2018 17:50 )  Alb: x     / Pro: 5.3 g/dL / ALK PHOS: x     / ALT: x     / AST: x     / GGT: x             CULTURES:   .Blood Blood  - @ 23:42   No growth to date.  --  --      .Urine Clean Catch (Midstream)  - @ 22:40   10,000 - 49,000 CFU/mL Enterococcus faecalis  --  --      .Urine Clean Catch (Midstream)  - @ 21:49   No growth  --  --      RADIOLOGY:  EXAM:  MR SPINE LUMBAR IC                        PROCEDURE DATE:  2018    INTERPRETATION:  MRI LUMBAR SPINE WITHOUT AND WITH CONTRAST    HISTORY: Back pain, low grade temperature. Evaluate for discitis.    TECHNIQUE: MRI of the lumbar spine was performed without and with   intravenous contrast utilizing a 1.5 Noemi magnet. 0.1 mmol/kg of   Gadavist intravenous contrast was administered to the patient.    COMPARISON: CT lumbar spine 3/25/2019.    FINDINGS:  There is a large left dorsal peripherally enhancing epidural collection   extending from approximately lower L1 through mid L4 levels, measuring   approximately 9.4 cm in CC dimension. It measures up to 1.6 x 0.9 cm in   TR by AP dimensions. There is mass effect on the thecal sac, which is   displaced anteriorly and to the right; the worse levels are at L2-L3 and   L3-L4, where this causes moderate to severe compression of the thecal sac   with near complete CSF effacement.    There is adjacent left paravertebral soft tissue thickening and   inflammatory signal, with mild lifting and thickening of the left psoas   muscle.    Mild diffuse hypointense T1 signal of the vertebral bodies is present.   There is mild abnormal T2/STIR hyperintensity of the L4 and L5 vertebral   bodies, with abnormal enhancement. Additionally, there is STIR   hyperintense signal of the L4-L5 intervertebral disc. These findings are   concerning for discitis/osteomyelitis of L4-L5.    Anterior to the L4-L5 disc and vertebral bodies, thereis a small focus   of T2 hyperintensity with a small apparent fluid collection/abscess   measuring 5 mm.    The conus medullaris is normal in signal and position. There is no   abnormal thickening or enhancement of the cauda equina nerve roots.    Mild dextroconvex curvature of the lumbar spine, with apex at L3.   Vertebral body heights are preserved. There is no fracture. There is   multilevel disc desiccation, with loss of height worse at L4-L5.    Findings by level;    T12-L1: No significant disc herniation, central canal stenosis or   neuroforaminal narrowing.    L1-L2: Mild diffuse disc bulge flattens the ventral thecal sac, without   intervening significant central canal stenosis or neural foraminal   narrowing.    L2-L3: The epidural collection, along with posterior disc bulge, and mild   facet arthropathy contributes to moderate-severe compression of the   thecal sac. No significant neuroforaminal narrowing.    L3-L4: The epidural collection, mild posterior disc bulge, mild facet   arthropathy with a small left facet joint effusion contributes to   moderate compression of the thecal sac. No significant neuroforaminal   narrowing.    L4-L5: Posterior disc osteophyte complex, facet arthropathy with moderate   right and trace left facet joint effusions and ligamenta flava thickening   contributes to mild central canal stenosis and moderate bilateral neural   foraminal narrowing. Moderate facet joint effusion extends   posterolaterally from the joints into the paravertebral soft tissues,   demonstrates peripheral enhancement.    L5-S1: Mild posterior disc bulge, facet arthropathy and ligamenta flava   thickening contribute to moderate right neural foraminal narrowing. No   significant central canal stenosis.    Retroaorticleft renal vein is incidentally noted.    IMPRESSION:  Large epidural fluid collection suggestive of an abscess in the lumbar   spine from lower L1 through mid L4 levels, with mass effect on the thecal   sac as described. Adjacent left paravertebral soft tissue thickening and   inflammatory signal.    Findings suggests discitis and osteomyelitis of L4-L5. Possible small   anterior epidural abscess at L4-L5 disc level.    Moderate right facet joint effusion at L4-L5 with extra-articular   extension the paravertebral soft tissues demonstrates peripheral   enhancement; an infection/abscess of the right L4-L5 facet joint is not   excluded.    Findings were discussed by Dr. Vale Fontaine M.D. of [facility] with CORNELIA Gallegos. Initial report created on 2018 9:07:42 PM EDT.        EXAM:  XR CHEST AP OR PA 1V                        PROCEDURE DATE:  2018    INTERPRETATION:  AP chest on 2018 at 4:30 PM. Patient has   weakness and abnormal laboratory values. Patient is unable to walk.    Poor inspirationcrowds the chest. The heart is magnified by technique.   The present film shows minimal density at the left lateral costophrenic   angle possibly atelectasis. This is new since 2016.    Otherwise no finding of note or change.    IMPRESSION: New minimal density left lateral costophrenic angle.

## 2018-04-06 NOTE — PROGRESS NOTE ADULT - PROBLEM SELECTOR PLAN 2
ortho spine consulted and recommended to transfer patient to [facility] for possible surgery   Continue with Vancomycin   ID Dr. Bowers.  ortho surgery Dr. Aquino received 2 units today   H/H responded well  monitor CBC  transfuse for <7 or active bleeding

## 2018-04-06 NOTE — PROGRESS NOTE ADULT - SUBJECTIVE AND OBJECTIVE BOX
Patient is a 68y old  Female   Events noted  MRI showed epidural abcess  Had UGI bleeding. EGD done, non bleeding ulcer    Waiting to be transferred to [facility]      MEDICATIONS  (STANDING):  calcium gluconate IVPB 2 Gram(s) IV Intermittent once  cefepime  IVPB      chlorhexidine 4% Liquid 1 Application(s) Topical <User Schedule>  lactated ringers. 1000 milliLiter(s) (50 mL/Hr) IV Continuous <Continuous>  lidocaine   Patch 1 Patch Transdermal daily  pantoprazole Infusion 8 mG/Hr (10 mL/Hr) IV Continuous <Continuous>  vancomycin  IVPB      vancomycin  IVPB 1000 milliGRAM(s) IV Intermittent every 12 hours    MEDICATIONS  (PRN):  acetaminophen  Suppository 650 milliGRAM(s) Rectal every 6 hours PRN For Temp greater than 38 C (100.4 F)  morphine  - Injectable 2 milliGRAM(s) IV Push every 4 hours PRN Severe Pain (7 - 10)  morphine  - Injectable 1 milliGRAM(s) IV Push every 6 hours PRN Moderate Pain (4 - 6)      Allergies    No Known Allergies    Intolerances        Vital Signs Last 24 Hrs  T(C): 37.7 (06 Apr 2018 19:44), Max: 38.7 (06 Apr 2018 18:00)  T(F): 99.9 (06 Apr 2018 19:44), Max: 101.6 (06 Apr 2018 18:00)  HR: 87 (06 Apr 2018 19:00) (74 - 105)  BP: 134/78 (06 Apr 2018 19:00) (80/51 - 134/78)  BP(mean): 91 (06 Apr 2018 19:00) (57 - 91)  RR: 25 (06 Apr 2018 19:00) (17 - 25)  SpO2: 98% (06 Apr 2018 19:00) (95% - 100%)    PHYSICAL EXAM  General: adult  rest deferred      LABS:                          9.5    16.9  )-----------( 300      ( 06 Apr 2018 18:16 )             28.3         Mean Cell Volume : 87.6 fl  Mean Cell Hemoglobin : 29.5 pg  Mean Cell Hemoglobin Concentration : 33.7 gm/dL  Auto Neutrophil # : x  Auto Lymphocyte # : x  Auto Monocyte # : x  Auto Eosinophil # : x  Auto Basophil # : x  Auto Neutrophil % : x  Auto Lymphocyte % : x  Auto Monocyte % : x  Auto Eosinophil % : x  Auto Basophil % : x      Serial CBC's  04-06 @ 18:16  Hct-28.3 / Hgb-9.5 / Plat-300 / RBC-3.24 / WBC-16.9  Serial CBC's  04-06 @ 13:50  Hct-24.3 / Hgb-7.9 / Plat-239 / RBC-2.76 / WBC-14.4  Serial CBC's  04-06 @ 06:32  Hct-21.1 / Hgb-6.8 / Plat-277 / RBC-2.31 / WBC-18.0  Serial CBC's  04-06 @ 01:47  Hct-22.3 / Hgb-7.3 / Plat-277 / RBC-2.46 / WBC-18.6  Serial CBC's  04-05 @ 22:11  Hct-18.6 / Hgb-5.9 / Plat-266 / RBC-2.04 / WBC-20.2  Serial CBC's  04-05 @ 12:06  Hct-20.0 / Hgb-6.2 / Plat-318 / RBC-2.16 / WBC-20.0  Serial CBC's  04-05 @ 10:01  Hct--- / Hgb--- / Plat--- / RBC-1.99 / WBC---  Serial CBC's  04-05 @ 07:24  Hct-18.6 / Hgb-6.3 / Plat-280 / RBC-2.03 / WBC-18.7  Serial CBC's  04-04 @ 18:45  Hct-22.1 / Hgb-7.1 / Plat-260 / RBC-2.40 / WBC-28.7  Serial CBC's  04-04 @ 15:03  Hct-23.2 / Hgb-7.4 / Plat-268 / RBC-2.49 / WBC-23.4      04-06    135  |  105  |  18  ----------------------------<  131<H>  4.1   |  21<L>  |  0.58    Ca    6.8<L>      06 Apr 2018 18:16  Phos  4.1     04-05  Mg     1.7     04-05    TPro  5.3<L>  /  Alb  x   /  TBili  x   /  DBili  x   /  AST  x   /  ALT  x   /  AlkPhos  x   04-05      PT/INR - ( 06 Apr 2018 13:50 )   PT: 15.2 sec;   INR: 1.39 ratio             Folate, Serum: 6.9 ng/mL (04-05 @ 10:02)  Vitamin B12, Serum: >2000 pg/mL (04-05 @ 10:02)  Reticulocyte Percent: 3.0 % (04-05 @ 10:01)  Ferritin, Serum: 530 ng/mL (04-04 @ 22:27)  Iron - Total Binding Capacity.: 197 ug/dL (04-04 @ 18:45)

## 2018-04-06 NOTE — CONSULT NOTE ADULT - SUBJECTIVE AND OBJECTIVE BOX
Pt Name: KASH HALL  MRN: [id number]    ORTHOPEDIC CONSULT:    Orthopedic diagnosis:    HPI: Patient is a 68y Female presents with lower back pain for 2 weeks which has progressively became worse. Pain is in lower back with associated weakness to lower extremities. Patient states she is unable to ambulated due to pain. Rest helps alleviate pain. Denies any falls of trauma to lower back. Denies CP, SOB. Denies bladder/bowel incontinence.         PAST MEDICAL & SURGICAL HISTORY:  Essential hypertension  Type 2 diabetes mellitus without complication  HLD (hyperlipidemia)  H/O tubal ligation      ALLERGIES: No Known Allergies      MEDICATIONS: acetaminophen   Tablet 650 milliGRAM(s) Oral every 6 hours PRN  acetaminophen   Tablet. 650 milliGRAM(s) Oral every 6 hours PRN  atorvastatin 40 milliGRAM(s) Oral at bedtime  chlorhexidine 4% Liquid 1 Application(s) Topical <User Schedule>  lidocaine   Patch 1 Patch Transdermal daily  morphine  - Injectable 1 milliGRAM(s) IV Push every 6 hours PRN  pantoprazole Infusion 8 mG/Hr IV Continuous <Continuous>  sodium chloride 0.9%. 1000 milliLiter(s) IV Continuous <Continuous>  traMADol 50 milliGRAM(s) Oral every 8 hours PRN  vancomycin  IVPB      vancomycin  IVPB 1000 milliGRAM(s) IV Intermittent every 12 hours      PHYSICAL EXAM:    Vital Signs Last 24 Hrs  T(C): 37 (06 Apr 2018 11:12), Max: 37.6 (06 Apr 2018 08:56)  T(F): 98.6 (06 Apr 2018 11:12), Max: 99.7 (06 Apr 2018 08:56)  HR: 89 (06 Apr 2018 11:12) (81 - 105)  BP: 118/68 (06 Apr 2018 11:12) (89/54 - 121/55)  BP(mean): 80 (06 Apr 2018 11:12) (57 - 86)  RR: 24 (06 Apr 2018 11:12) (16 - 24)  SpO2: 97% (06 Apr 2018 11:12) (94% - 99%)    Back: Skin intact. No gross deformity. No tenderness on palpation of lumbar spine.  Lower Extremities: Sensation intact to LE B/L. Calves soft and NT B/L. 1+ pulses distally. ROM of toes and ankles on plantarflexion and dorsiflexion. Weakness of LE B/L on straight leg raise and extension.       LABS:                        6.8    18.0  )-----------( 277      ( 06 Apr 2018 06:32 )             21.1     04-06    132<L>  |  102  |  30<H>  ----------------------------<  143<H>  4.5   |  23  |  0.67    Ca    6.6<L>      06 Apr 2018 06:32  Phos  4.1     04-05  Mg     1.7     04-05    TPro  5.3<L>  /  Alb  x   /  TBili  x   /  DBili  x   /  AST  x   /  ALT  x   /  AlkPhos  x   04-05        RADIOLOGY: MRI of Lumbar Spine: Large epidural fluid collection suggestive of an abscess in the lumbar   spine from lower L1 through mid L4 levels, with mass effect on the thecal   sac as described. Adjacent left paravertebral soft tissue thickening and   inflammatory signal.    Findings suggests discitis and osteomyelitis of L4-L5. Possible small   anterior epidural abscess at L4-L5 disc level.    Moderate right facet joint effusion at L4-L5 with extra-articular   extension the paravertebral soft tissues demonstrates peripheral   enhancement; an infection/abscess of the right L4-L5 facet joint is not   excluded.      IMPRESSION: Pt is a  68y Female with L1-4 Epidural abscess and L4-5 osteomyelitis    PLAN:  -  Pain management  -  DVT prophylaxis  -  Recommend transfer to [facility] for surgical intervention of epidural abscess and osteomyelitis(Laminectomy/Fusion/Decompression/Drainage of abscess)  -  Continue IV abx as per ID  -  Case discussed with Dr. Oneill/Dr. Aquino Pt Name: KASH HALL  MRN: [id number]    ORTHOPEDIC CONSULT:    Orthopedic diagnosis:    HPI: Patient is a 68y Female presents with lower back pain for 2 weeks which has progressively became worse. Pain is in lower back with associated weakness to lower extremities. Patient states she is unable to ambulated due to pain. Rest helps alleviate pain. Denies any falls of trauma to lower back. Denies CP, SOB. Denies bladder/bowel incontinence.         PAST MEDICAL & SURGICAL HISTORY:  Essential hypertension  Type 2 diabetes mellitus without complication  HLD (hyperlipidemia)  H/O tubal ligation      ALLERGIES: No Known Allergies      MEDICATIONS: acetaminophen   Tablet 650 milliGRAM(s) Oral every 6 hours PRN  acetaminophen   Tablet. 650 milliGRAM(s) Oral every 6 hours PRN  atorvastatin 40 milliGRAM(s) Oral at bedtime  chlorhexidine 4% Liquid 1 Application(s) Topical <User Schedule>  lidocaine   Patch 1 Patch Transdermal daily  morphine  - Injectable 1 milliGRAM(s) IV Push every 6 hours PRN  pantoprazole Infusion 8 mG/Hr IV Continuous <Continuous>  sodium chloride 0.9%. 1000 milliLiter(s) IV Continuous <Continuous>  traMADol 50 milliGRAM(s) Oral every 8 hours PRN  vancomycin  IVPB      vancomycin  IVPB 1000 milliGRAM(s) IV Intermittent every 12 hours      PHYSICAL EXAM:    Vital Signs Last 24 Hrs  T(C): 37 (06 Apr 2018 11:12), Max: 37.6 (06 Apr 2018 08:56)  T(F): 98.6 (06 Apr 2018 11:12), Max: 99.7 (06 Apr 2018 08:56)  HR: 89 (06 Apr 2018 11:12) (81 - 105)  BP: 118/68 (06 Apr 2018 11:12) (89/54 - 121/55)  BP(mean): 80 (06 Apr 2018 11:12) (57 - 86)  RR: 24 (06 Apr 2018 11:12) (16 - 24)  SpO2: 97% (06 Apr 2018 11:12) (94% - 99%)    Back: Skin intact. No gross deformity. No tenderness on palpation of lumbar spine.  Lower Extremities: Sensation intact to LE B/L. Calves soft and NT B/L. 1+ pulses distally. ROM of toes and ankles on plantarflexion and dorsiflexion. Weakness of LE B/L on straight leg raise and extension.       LABS:                        6.8    18.0  )-----------( 277      ( 06 Apr 2018 06:32 )             21.1     04-06    132<L>  |  102  |  30<H>  ----------------------------<  143<H>  4.5   |  23  |  0.67    Ca    6.6<L>      06 Apr 2018 06:32  Phos  4.1     04-05  Mg     1.7     04-05    TPro  5.3<L>  /  Alb  x   /  TBili  x   /  DBili  x   /  AST  x   /  ALT  x   /  AlkPhos  x   04-05        RADIOLOGY: MRI of Lumbar Spine: Large epidural fluid collection suggestive of an abscess in the lumbar   spine from lower L1 through mid L4 levels, with mass effect on the thecal   sac as described. Adjacent left paravertebral soft tissue thickening and   inflammatory signal.    Findings suggests discitis and osteomyelitis of L4-L5. Possible small   anterior epidural abscess at L4-L5 disc level.    Moderate right facet joint effusion at L4-L5 with extra-articular   extension the paravertebral soft tissues demonstrates peripheral   enhancement; an infection/abscess of the right L4-L5 facet joint is not   excluded.      IMPRESSION: Pt is a  68y Female with L1-4 Epidural abscess and L4-5 osteomyelitis    PLAN:  -  Pain management  -  DVT prophylaxis  -  Recommend transfer to [facility] for surgical intervention of epidural abscess and osteomyelitis(Laminectomy/Fusion/Decompression/Drainage of abscess)  -  Continue IV abx as per ID  -  Case discussed with Dr. Oneill

## 2018-04-06 NOTE — PROGRESS NOTE ADULT - ASSESSMENT
ANEMIA with High proteins  SPEP is awaited, but the MRI showed epidural abscess That is perhaps the main explanation of the lab findings as reactive to infection  GI bleed could be due to NSAID use?    In process of being transferred.  Will remain available    discussed in detail with the daughter by the bedside    Nasir Gondal  [phone]

## 2018-04-06 NOTE — ACUTE INTERFACILITY TRANSFER NOTE - HOSPITAL COURSE
HPI:  68F from home PMH HTN, DM, HLD who comes to hospital for 2 weeks of back pain. Daughter says that she has had longstanding back pain but in the past 2 weeks, it has worsened significantly. She is unable to walk, the pain is very severe, she feels dizzy, weakness in the bilateral LE, says she cannot balance well. She came to ED last week, had temperature of 100.1, and was discharged from the ED with outpatient follow up. Today she saw her regular doctor who noted all her complaints and told her to come to the hospital. Patient also complains of night sweats, subjective fevers at night. Denies any cough, sob, nausea, vomiting, diarrhea, abd pain. Moved here from [place] 8 years ago. Denies any bowel or bladder incontinence. (04 Apr 2018 17:58)    hospital course:   patient was admitted to ICU for close monitoring of HB. Hb was checked every 4 hours and PRBC was transfused as needed. patient received 4 units of PRBC and 1 unit of Platelet. EGD was done and large antral ulcer noted - CT with contrast was advised by GI to rule out malignancy.   patient noted to have 10*12 mm epidural abscess at L3-L4 level. ortho spine surgeon consulted and advised patient to transfer to [facility] for possible surgical intervention.   repeat EGD recommended as per GI before surgery for stability of ulcer.     plan discussed with ICU attending.

## 2018-04-06 NOTE — PROGRESS NOTE ADULT - SUBJECTIVE AND OBJECTIVE BOX
INTERVAL HPI/OVERNIGHT EVENTS: ***    PRESSORS: [ ] YES [ ] NO  WHICH:    ANTIBIOTICS:                  DATE STARTED:  ANTIBIOTICS:                  DATE STARTED:  ANTIBIOTICS:                  DATE STARTED:    Antimicrobial:  vancomycin  IVPB      vancomycin  IVPB 1000 milliGRAM(s) IV Intermittent every 12 hours    Cardiovascular:    Pulmonary:    Hematalogic:    Other:  acetaminophen   Tablet 650 milliGRAM(s) Oral every 6 hours PRN  acetaminophen   Tablet. 650 milliGRAM(s) Oral every 6 hours PRN  atorvastatin 40 milliGRAM(s) Oral at bedtime  lidocaine   Patch 1 Patch Transdermal daily  morphine  - Injectable 1 milliGRAM(s) IV Push every 6 hours PRN  pantoprazole Infusion 8 mG/Hr IV Continuous <Continuous>  sodium chloride 0.9%. 1000 milliLiter(s) IV Continuous <Continuous>  traMADol 50 milliGRAM(s) Oral every 8 hours PRN    acetaminophen   Tablet 650 milliGRAM(s) Oral every 6 hours PRN  acetaminophen   Tablet. 650 milliGRAM(s) Oral every 6 hours PRN  atorvastatin 40 milliGRAM(s) Oral at bedtime  lidocaine   Patch 1 Patch Transdermal daily  morphine  - Injectable 1 milliGRAM(s) IV Push every 6 hours PRN  pantoprazole Infusion 8 mG/Hr IV Continuous <Continuous>  sodium chloride 0.9%. 1000 milliLiter(s) IV Continuous <Continuous>  traMADol 50 milliGRAM(s) Oral every 8 hours PRN  vancomycin  IVPB      vancomycin  IVPB 1000 milliGRAM(s) IV Intermittent every 12 hours    Drug Dosing Weight  Height (cm): 157.48 (2018 00:00)  Weight (kg): 65.8 (2018 00:00)  BMI (kg/m2): 26.5 (2018 00:00)  BSA (m2): 1.67 (2018 00:00)    CENTRAL LINE: [ ] YES [ ] NO  LOCATION:   DATE INSERTED:  REMOVE: [ ] YES [ ] NO  EXPLAIN:    CHANG: [ ] YES [ ] NO    DATE INSERTED:  REMOVE:  [ ] YES [ ] NO  EXPLAIN:    A-LINE:  [ ] YES [ ] NO  LOCATION:   DATE INSERTED:  REMOVE:  [ ] YES [ ] NO  EXPLAIN:    PMH -reviewed admission note, no change since admission  Heart faliure: acute [ ] chronic [ ] acute or chronic [ ] diastolic [ ] systolic [ ] combied systolic and diastolic[ ]  TOVA: ATN[ ] renal medullary necrosis [ ] CKD I [ ]CKDII [ ]CKD III [ ]CKD IV [ ]CKD V [ ]Other pathological lesions [ ]  Abdominal Nutrition Status: malnutrition [ ] cachexia [ ] morbid obesity/BMI=40 [ ] Supplement ordered [___________]     ICU Vital Signs Last 24 Hrs  T(C): 37.3 (2018 07:00), Max: 37.3 (2018 05:00)  T(F): 99.2 (2018 07:00), Max: 99.2 (2018 05:00)  HR: 102 (2018 07:00) (81 - 105)  BP: 116/61 (2018 07:00) (89/54 - 121/55)  BP(mean): 86 (2018 07:00) (57 - 86)  ABP: --  ABP(mean): --  RR: 21 (2018 07:00) (16 - 21)  SpO2: 97% (2018 07:00) (94% - 99%)            04-05 @ 07:01  -  -06 @ 07:00  --------------------------------------------------------  IN: 480 mL / OUT: 850 mL / NET: -370 mL            PHYSICAL EXAM:    GENERAL: [ ]NAD, [ ]well-groomed, [ ]well-developed  HEAD:  [ ]Atraumatic, [ ]Normocephalic  EYES: [ ]EOMI, [ ]PERRLA, [ ]conjunctiva and sclera clear  ENMT: [ ]No tonsillar erythema, exudates, or enlargement; [ ]Moist mucous membranes, [ ]Good dentition, [ ]No lesions  NECK: [ ]Supple, normal appearance, [ ]No JVD; [ ]Normal thyroid; [ ]Trachea midline  NERVOUS SYSTEM:  [ ]Alert & Oriented X3, [ ]Good concentration; [ ]Motor Strength 5/5 B/L upper and lower extremities; [ ]DTRs 2+ intact and symmetric  CHEST/LUNG: [ ]No chest deformity; [ ]Normal percussion bilaterally; [ ]No rales, rhonchi, wheezing   HEART: [ ]Regular rate and rhythm; [ ]No murmurs, rubs, or gallops  ABDOMEN: [ ]Soft, Nontender, Nondistended; [ ]Bowel sounds present  EXTREMITIES:  [ ]2+ Peripheral Pulses, [ ]No clubbing, cyanosis, or edema  LYMPH: [ ]No lymphadenopathy noted  SKIN: [ ]No rashes or lesions; [ ]Good capillary refill      LABS:  CBC Full  -  ( 2018 06:32 )  WBC Count : 18.0 K/uL  Hemoglobin : 6.8 g/dL  Hematocrit : 21.1 %  Platelet Count - Automated : 277 K/uL  Mean Cell Volume : 91.5 fl  Mean Cell Hemoglobin : 29.6 pg  Mean Cell Hemoglobin Concentration : 32.4 gm/dL  Auto Neutrophil # : x  Auto Lymphocyte # : x  Auto Monocyte # : x  Auto Eosinophil # : x  Auto Basophil # : x  Auto Neutrophil % : x  Auto Lymphocyte % : x  Auto Monocyte % : x  Auto Eosinophil % : x  Auto Basophil % : x    04-    132<L>  |  102  |  30<H>  ----------------------------<  143<H>  4.5   |  23  |  0.67    Ca    6.6<L>      2018 06:32  Phos  4.1     04-05  Mg     1.7     04-05    TPro  5.3<L>  /  Alb  x   /  TBili  x   /  DBili  x   /  AST  x   /  ALT  x   /  AlkPhos  x   04-05      Urinalysis Basic - ( 2018 15:19 )    Color: Yellow / Appearance: Clear / S.010 / pH: x  Gluc: x / Ketone: Negative  / Bili: Negative / Urobili: Negative   Blood: x / Protein: Negative / Nitrite: Negative   Leuk Esterase: Trace / RBC: 2-5 /HPF / WBC 3-5 /HPF   Sq Epi: x / Non Sq Epi: Moderate /HPF / Bacteria: Few /HPF      Culture Results:   No growth to date. ( @ 23:42)  Culture Results:   No growth to date. ( @ 23:42)  Culture Results:   10,000 - 49,000 CFU/mL Enterococcus faecalis ( @ 22:40)      RADIOLOGY & ADDITIONAL STUDIES REVIEWED:  ***    [ ]GOALS OF CARE DISCUSSION WITH PATIENT/FAMILY/PROXY:    CRITICAL CARE TIME SPENT: 35 minutes INTERVAL HPI/OVERNIGHT EVENTS:     PRESSORS: [ ] YES [x ] NO  WHICH:    ANTIBIOTICS: vanco                 DATE STARTED:     Antimicrobial:  vancomycin  IVPB      vancomycin  IVPB 1000 milliGRAM(s) IV Intermittent every 12 hours    Cardiovascular:    Pulmonary:    Hematalogic:    Other:  acetaminophen   Tablet 650 milliGRAM(s) Oral every 6 hours PRN  acetaminophen   Tablet. 650 milliGRAM(s) Oral every 6 hours PRN  atorvastatin 40 milliGRAM(s) Oral at bedtime  lidocaine   Patch 1 Patch Transdermal daily  morphine  - Injectable 1 milliGRAM(s) IV Push every 6 hours PRN  pantoprazole Infusion 8 mG/Hr IV Continuous <Continuous>  sodium chloride 0.9%. 1000 milliLiter(s) IV Continuous <Continuous>  traMADol 50 milliGRAM(s) Oral every 8 hours PRN    acetaminophen   Tablet 650 milliGRAM(s) Oral every 6 hours PRN  acetaminophen   Tablet. 650 milliGRAM(s) Oral every 6 hours PRN  atorvastatin 40 milliGRAM(s) Oral at bedtime  lidocaine   Patch 1 Patch Transdermal daily  morphine  - Injectable 1 milliGRAM(s) IV Push every 6 hours PRN  pantoprazole Infusion 8 mG/Hr IV Continuous <Continuous>  sodium chloride 0.9%. 1000 milliLiter(s) IV Continuous <Continuous>  traMADol 50 milliGRAM(s) Oral every 8 hours PRN  vancomycin  IVPB      vancomycin  IVPB 1000 milliGRAM(s) IV Intermittent every 12 hours    Drug Dosing Weight  Height (cm): 157.48 (2018 00:00)  Weight (kg): 65.8 (2018 00:00)  BMI (kg/m2): 26.5 (2018 00:00)  BSA (m2): 1.67 (2018 00:00)    CENTRAL LINE: [ ] YES [ x] NO  LOCATION:   DATE INSERTED:  REMOVE: [ ] YES [ ] NO  EXPLAIN:    CHANG: [ ] YES [x ] NO    DATE INSERTED:  REMOVE:  [ ] YES [ ] NO  EXPLAIN:    A-LINE:  [ ] YES [x ] NO  LOCATION:   DATE INSERTED:  REMOVE:  [ ] YES [ ] NO  EXPLAIN:    PMH -reviewed admission note, no change since admission  Heart faliure: acute [ ] chronic [ ] acute or chronic [ ] diastolic [ ] systolic [ ] combied systolic and diastolic[ ]  TOVA: ATN[ ] renal medullary necrosis [ ] CKD I [ ]CKDII [ ]CKD III [ ]CKD IV [ ]CKD V [ ]Other pathological lesions [ ]  Abdominal Nutrition Status: malnutrition [ ] cachexia [ ] morbid obesity/BMI=40 [ ] Supplement ordered [___________]     ICU Vital Signs Last 24 Hrs  T(C): 37.3 (2018 07:00), Max: 37.3 (2018 05:00)  T(F): 99.2 (2018 07:00), Max: 99.2 (2018 05:00)  HR: 102 (2018 07:00) (81 - 105)  BP: 116/61 (2018 07:00) (89/54 - 121/55)  BP(mean): 86 (2018 07:00) (57 - 86)  ABP: --  ABP(mean): --  RR: 21 (2018 07:00) (16 - 21)  SpO2: 97% (2018 07:00) (94% - 99%)            04-05 @ 07:01  -  -06 @ 07:00  --------------------------------------------------------  IN: 480 mL / OUT: 850 mL / NET: -370 mL            PHYSICAL EXAM:    GENERAL: [x ]NAD, [x ]well-groomed, [x ]well-developed  HEAD:  [x ]Atraumatic, [x ]Normocephalic  EYES: [x ]EOMI, [x]PERRLA, [x ]conjunctiva and sclera clear  ENMT: [x ]No tonsillar erythema, exudates, or enlargement; [x ]Moist mucous membranes, [ ]Good dentition, [ ]No lesions  NECK: [x ]Supple, normal appearance, [x ]No JVD; [ ]Normal thyroid; [ ]Trachea midline  NERVOUS SYSTEM:  [x ]Alert & Oriented X3, [x ]Good concentration; [x ]Motor Strength 5/5 B/L upper extremities; [ x]DTRs 2+ intact and symmetric  [x] motor strength 4/5 noted   CHEST/LUNG: [x ]No chest deformity; [x ]Normal percussion bilaterally; [x ]No rales, rhonchi, wheezing   HEART: [x ]Regular rate and rhythm; [x ]No murmurs, rubs, or gallops  ABDOMEN: [x ]Soft, Nontender, Nondistended; [ ]Bowel sounds present  EXTREMITIES:  [x ]2+ Peripheral Pulses, [ x]No clubbing, cyanosis, or edema  LYMPH: [x ]No lymphadenopathy noted  SKIN: [x ]No rashes or lesions; [x ]Good capillary refill      LABS:  CBC Full  -  ( 2018 06:32 )  WBC Count : 18.0 K/uL  Hemoglobin : 6.8 g/dL  Hematocrit : 21.1 %  Platelet Count - Automated : 277 K/uL  Mean Cell Volume : 91.5 fl  Mean Cell Hemoglobin : 29.6 pg  Mean Cell Hemoglobin Concentration : 32.4 gm/dL  Auto Neutrophil # : x  Auto Lymphocyte # : x  Auto Monocyte # : x  Auto Eosinophil # : x  Auto Basophil # : x  Auto Neutrophil % : x  Auto Lymphocyte % : x  Auto Monocyte % : x  Auto Eosinophil % : x  Auto Basophil % : x    04-    132<L>  |  102  |  30<H>  ----------------------------<  143<H>  4.5   |  23  |  0.67    Ca    6.6<L>      2018 06:32  Phos  4.1     04-05  Mg     1.7     -05    TPro  5.3<L>  /  Alb  x   /  TBili  x   /  DBili  x   /  AST  x   /  ALT  x   /  AlkPhos  x   04-05      Urinalysis Basic - ( 2018 15:19 )    Color: Yellow / Appearance: Clear / S.010 / pH: x  Gluc: x / Ketone: Negative  / Bili: Negative / Urobili: Negative   Blood: x / Protein: Negative / Nitrite: Negative   Leuk Esterase: Trace / RBC: 2-5 /HPF / WBC 3-5 /HPF   Sq Epi: x / Non Sq Epi: Moderate /HPF / Bacteria: Few /HPF      Culture Results:   No growth to date. ( @ 23:42)  Culture Results:   No growth to date. ( @ 23:42)  Culture Results:   10,000 - 49,000 CFU/mL Enterococcus faecalis ( @ 22:40)      RADIOLOGY & ADDITIONAL STUDIES REVIEWED:  < from: MR Lumbar Spine w/ IV Cont (18 @ 20:12) >  IMPRESSION:  Large epidural fluid collection suggestive of an abscess in the lumbar   spine from lower L1 through mid L4 levels, with mass effect on the thecal   sac as described. Adjacent left paravertebral soft tissue thickening and   inflammatory signal.    Findings suggests discitis and osteomyelitis of L4-L5. Possible small   anterior epidural abscess at L4-L5 disc level.    Moderate right facet joint effusion at L4-L5 with extra-articular   extension the paravertebral soft tissues demonstrates peripheral   enhancement; an infection/abscess of the right L4-L5 facet joint is not   excluded.      < end of copied text >      [x ]GOALS OF CARE DISCUSSION WITH PATIENT/FAMILY/PROXY: full code    CRITICAL CARE TIME SPENT: 35 minutes

## 2018-04-06 NOTE — PROGRESS NOTE ADULT - PROBLEM SELECTOR PLAN 2
Neurosurgery was consulted , recommended IR guided drainage   follow up with  IR  Continue with Vancomycin   ID Dr. Bowers. ortho spine consulted and recommended to transfer patient to [facility] for possible surgery   Continue with Vancomycin   ID Dr. Bowers.  ortho surgery Dr. Aquino

## 2018-04-06 NOTE — PROGRESS NOTE ADULT - PROBLEM SELECTOR PLAN 5
c/w home losartan  -will hold HCTZ due to likely contribution to hyponatremia  -monitor bp  -may need to start CCB if BP becomes uncontrolled
on protonix infusion and holding heparin - GI bleed  Bilateral SCDs fro DVT prophylaxis

## 2018-04-06 NOTE — PROGRESS NOTE ADULT - ASSESSMENT
68F from home PMH HTN, DM, HLD who comes to hospital for 2 weeks of back pain. Patient dropped Hb , and had one episode of coffee ground emesis . Pt received 1 U PRBC , Hb remained 5.9 after 1 U PRBC. Pt blood pressure dropped to 89/54. ICU was consulted for the concern of hypotension in the setting of GI bleed. 68F from home PMH HTN, DM, HLD who comes to hospital for 2 weeks of back pain. Patient dropped Hb , and had one episode of coffee ground emesis . Pt received 1 U PRBC , Hb remained 5.9 after 1 U PRBC. Pt blood pressure dropped to 89/54. ICU was consulted for the concern of hypotension in the setting of GI bleed.  EGD performed today. Pt had additional 2 units PRBC today.;

## 2018-04-06 NOTE — CONSULT NOTE ADULT - CONSULT REASON
GI bleed
L1-4 Epidural Abscess
Lumbar epidural abscess with discitis, cellulitis and myositis
anemia and high proteins.
anemia

## 2018-04-06 NOTE — PROGRESS NOTE ADULT - SUBJECTIVE AND OBJECTIVE BOX
INTERVAL HPI/OVERNIGHT EVENTS: febrile this evening     PRESSORS: [ ] YES [x} NO  WHICH:      Antimicrobial:  cefepime  IVPB      cefepime  IVPB 2000 milliGRAM(s) IV Intermittent once  vancomycin  IVPB      vancomycin  IVPB 1000 milliGRAM(s) IV Intermittent every 12 hours      Other:  acetaminophen   Tablet 650 milliGRAM(s) Oral every 6 hours PRN  acetaminophen   Tablet. 650 milliGRAM(s) Oral every 6 hours PRN  atorvastatin 40 milliGRAM(s) Oral at bedtime  chlorhexidine 4% Liquid 1 Application(s) Topical <User Schedule>  lidocaine   Patch 1 Patch Transdermal daily  morphine  - Injectable 1 milliGRAM(s) IV Push every 6 hours PRN  pantoprazole Infusion 8 mG/Hr IV Continuous <Continuous>  sodium chloride 0.9%. 1000 milliLiter(s) IV Continuous <Continuous>  traMADol 50 milliGRAM(s) Oral every 8 hours PRN    acetaminophen   Tablet 650 milliGRAM(s) Oral every 6 hours PRN  acetaminophen   Tablet. 650 milliGRAM(s) Oral every 6 hours PRN  atorvastatin 40 milliGRAM(s) Oral at bedtime  cefepime  IVPB      cefepime  IVPB 2000 milliGRAM(s) IV Intermittent once  chlorhexidine 4% Liquid 1 Application(s) Topical <User Schedule>  lidocaine   Patch 1 Patch Transdermal daily  morphine  - Injectable 1 milliGRAM(s) IV Push every 6 hours PRN  pantoprazole Infusion 8 mG/Hr IV Continuous <Continuous>  sodium chloride 0.9%. 1000 milliLiter(s) IV Continuous <Continuous>  traMADol 50 milliGRAM(s) Oral every 8 hours PRN  vancomycin  IVPB      vancomycin  IVPB 1000 milliGRAM(s) IV Intermittent every 12 hours    Drug Dosing Weight  Height (cm): 157.48 (06 Apr 2018 00:00)  Weight (kg): 65.8 (06 Apr 2018 00:00)  BMI (kg/m2): 26.5 (06 Apr 2018 00:00)  BSA (m2): 1.67 (06 Apr 2018 00:00)    CENTRAL LINE: [ ] YES [ ] NO  LOCATION:   DATE INSERTED:  REMOVE: [ ] YES [ ] NO  EXPLAIN:    CHANG: [ ] YES [ ] NO    DATE INSERTED:  REMOVE:  [ ] YES [ ] NO  EXPLAIN:    A-LINE:  [ ] YES [ ] NO  LOCATION:   DATE INSERTED:  REMOVE:  [ ] YES [ ] NO  EXPLAIN:    PMH -reviewed admission note, no change since admission  PAST MEDICAL & SURGICAL HISTORY:  Essential hypertension  Type 2 diabetes mellitus without complication  HLD (hyperlipidemia)  H/O tubal ligation      ICU Vital Signs Last 24 Hrs  T(C): 38.7 (06 Apr 2018 18:00), Max: 38.7 (06 Apr 2018 18:00)  T(F): 101.6 (06 Apr 2018 18:00), Max: 101.6 (06 Apr 2018 18:00)  HR: 92 (06 Apr 2018 18:00) (74 - 105)  BP: 134/76 (06 Apr 2018 18:00) (80/51 - 134/76)  BP(mean): 91 (06 Apr 2018 18:00) (57 - 91)  ABP: --  ABP(mean): --  RR: 20 (06 Apr 2018 18:00) (17 - 25)  SpO2: 97% (06 Apr 2018 18:00) (95% - 100%)            04-05 @ 07:01  -  04-06 @ 07:00  --------------------------------------------------------  IN: 480 mL / OUT: 850 mL / NET: -370 mL            PHYSICAL EXAM:    GENERAL: [ ]NAD, [ ]well-groomed, [ ]well-developed  HEAD:  [ ]Atraumatic, [ ]Normocephalic  EYES: [ ]EOMI, [ ]PERRLA, [ ]conjunctiva and sclera clear  ENMT: [ ]No tonsillar erythema, exudates, or enlargement; [ ]Moist mucous membranes, [ ]Good dentition, [ ]No lesions  NECK: [ ]Supple, normal appearance, [ ]No JVD; [ ]Normal thyroid; [ ]Trachea midline  NERVOUS SYSTEM:  [ ]Alert & Oriented X3, [ ]Good concentration; [ ]Motor Strength 5/5 B/L upper and lower extremities; [ ]DTRs 2+ intact and symmetric  CHEST/LUNG: [ ]No chest deformity; [ ]Normal percussion bilaterally; [ ]No rales, rhonchi, wheezing   HEART: [ ]Regular rate and rhythm; [ ]No murmurs, rubs, or gallops  ABDOMEN: [ ]Soft, Nontender, Nondistended; [ ]Bowel sounds present  EXTREMITIES:  [ ]2+ Peripheral Pulses, [ ]No clubbing, cyanosis, or edema  LYMPH: [ ]No lymphadenopathy noted  SKIN: [ ]No rashes or lesions; [ ]Good capillary refill      LABS:  CBC Full  -  ( 06 Apr 2018 18:16 )  WBC Count : 16.9 K/uL  Hemoglobin : 9.5 g/dL  Hematocrit : 28.3 %  Platelet Count - Automated : 300 K/uL  Mean Cell Volume : 87.6 fl  Mean Cell Hemoglobin : 29.5 pg  Mean Cell Hemoglobin Concentration : 33.7 gm/dL  Auto Neutrophil # : x  Auto Lymphocyte # : x  Auto Monocyte # : x  Auto Eosinophil # : x  Auto Basophil # : x  Auto Neutrophil % : x  Auto Lymphocyte % : x  Auto Monocyte % : x  Auto Eosinophil % : x  Auto Basophil % : x    04-06    132<L>  |  102  |  30<H>  ----------------------------<  143<H>  4.5   |  23  |  0.67    Ca    6.6<L>      06 Apr 2018 06:32  Phos  4.1     04-05  Mg     1.7     04-05    TPro  5.3<L>  /  Alb  x   /  TBili  x   /  DBili  x   /  AST  x   /  ALT  x   /  AlkPhos  x   04-05    PT/INR - ( 06 Apr 2018 13:50 )   PT: 15.2 sec;   INR: 1.39 ratio             Culture Results:   No growth to date. (04-04 @ 23:42)  Culture Results:   No growth to date. (04-04 @ 23:42)  Culture Results:   10,000 - 49,000 CFU/mL Enterococcus faecalis (04-04 @ 22:40)      RADIOLOGY & ADDITIONAL STUDIES REVIEWED:   < from: MR Lumbar Spine w/ IV Cont (04.05.18 @ 20:12) >  FINDINGS:  There is a large left dorsal peripherally enhancing epidural collection   extending from approximately lower L1 through mid L4 levels, measuring   approximately 9.4 cm in CC dimension. It measures up to 1.6 x 0.9 cm in   TR by AP dimensions. There is mass effect on the thecal sac, which is   displaced anteriorly and to the right; the worse levels are at L2-L3 and   L3-L4, where this causes moderate to severe compression of the thecal sac   with near complete CSF effacement.    There is adjacent left paravertebral soft tissue thickening and   inflammatory signal, with mild lifting and thickening of the left psoas   muscle.    Mild diffuse hypointense T1 signal of the vertebral bodies is present.   There is mild abnormal T2/STIR hyperintensity of the L4 and L5 vertebral   bodies, with abnormal enhancement. Additionally, there is STIR   hyperintense signal of the L4-L5 intervertebral disc. These findings are   concerning for discitis/osteomyelitis of L4-L5.    Anterior to the L4-L5 disc and vertebral bodies, thereis a small focus   of T2 hyperintensity with a small apparent fluid collection/abscess   measuring 5 mm.    The conus medullaris is normal in signal and position. There is no   abnormal thickening or enhancement of the cauda equina nerve roots.    Mild dextroconvex curvature of the lumbar spine, with apex at L3.   Vertebral body heights are preserved. There is no fracture. There is   multilevel disc desiccation, with loss of height worse at L4-L5.    Findings by level;    T12-L1: No significant disc herniation, central canal stenosis or   neuroforaminal narrowing.    L1-L2: Mild diffuse disc bulge flattens the ventral thecal sac, without   intervening significant central canal stenosis or neural foraminal   narrowing.    L2-L3: The epidural collection, along with posterior disc bulge, and mild   facet arthropathy contributes to moderate-severe compression of the   thecal sac. No significant neuroforaminal narrowing.    L3-L4: The epidural collection, mild posterior disc bulge, mild facet   arthropathy with a small left facet joint effusion contributes to   moderate compression of the thecal sac. No significant neuroforaminal   narrowing.    L4-L5: Posterior disc osteophyte complex, facet arthropathy with moderate   right and trace left facet joint effusions and ligamenta flava thickening   contributes to mild central canal stenosis and moderate bilateral neural   foraminal narrowing. Moderate facet joint effusion extends   posterolaterally from the joints into the paravertebral soft tissues,   demonstrates peripheral enhancement.    L5-S1: Mild posterior disc bulge, facet arthropathy and ligamenta flava   thickening contribute to moderate right neural foraminal narrowing. No   significant central canal stenosis.    Retroaorticleft renal vein is incidentally noted.    IMPRESSION:  Large epidural fluid collection suggestive of an abscess in the lumbar   spine from lower L1 through mid L4 levels, with mass effect on the thecal   sac as described. Adjacent left paravertebral soft tissue thickening and   inflammatory signal.    Findings suggests discitis and osteomyelitis of L4-L5. Possible small   anterior epidural abscess at L4-L5 disc level.    Moderate right facet joint effusion at L4-L5 with extra-articular   extension the paravertebral soft tissues demonstrates peripheral   enhancement; an infection/abscess of the right L4-L5 facet joint is not   excluded.    Findings were discussed by Dr. Vale Fontaine M.D. of [facility] with CORNELIA Gallegos. Initial report created on 4/5/2018 9:07:42 PM EDT.                SOWMYA BELL M.D., ATTENDING RADIOLOGIST  This document has been electronically signed. Apr 6 2018 11:24AM    < end of copied text >    [ ]GOALS OF CARE DISCUSSION WITH PATIENT/FAMILY/PROXY:    CRITICAL CARE TIME SPENT: 35 minutes INTERVAL HPI/OVERNIGHT EVENTS: 67 y/o female with HTN, DM II a/w back pain found to have large epidural abscess at L3-4, subsequently had hematemesis with acute blood loss anemia. EGD performed today shows large ulcer and erosions. She was febrile this evening with rigors, cultures drawn, Cefepime added     PRESSORS: [ ] YES [x} NO  WHICH:      Antimicrobial:  cefepime  IVPB      cefepime  IVPB 2000 milliGRAM(s) IV Intermittent once  vancomycin  IVPB      vancomycin  IVPB 1000 milliGRAM(s) IV Intermittent every 12 hours      Other:  acetaminophen   Tablet 650 milliGRAM(s) Oral every 6 hours PRN  acetaminophen   Tablet. 650 milliGRAM(s) Oral every 6 hours PRN  atorvastatin 40 milliGRAM(s) Oral at bedtime  chlorhexidine 4% Liquid 1 Application(s) Topical <User Schedule>  lidocaine   Patch 1 Patch Transdermal daily  morphine  - Injectable 1 milliGRAM(s) IV Push every 6 hours PRN  pantoprazole Infusion 8 mG/Hr IV Continuous <Continuous>  sodium chloride 0.9%. 1000 milliLiter(s) IV Continuous <Continuous>  traMADol 50 milliGRAM(s) Oral every 8 hours PRN    acetaminophen   Tablet 650 milliGRAM(s) Oral every 6 hours PRN  acetaminophen   Tablet. 650 milliGRAM(s) Oral every 6 hours PRN  atorvastatin 40 milliGRAM(s) Oral at bedtime  cefepime  IVPB      cefepime  IVPB 2000 milliGRAM(s) IV Intermittent once  chlorhexidine 4% Liquid 1 Application(s) Topical <User Schedule>  lidocaine   Patch 1 Patch Transdermal daily  morphine  - Injectable 1 milliGRAM(s) IV Push every 6 hours PRN  pantoprazole Infusion 8 mG/Hr IV Continuous <Continuous>  sodium chloride 0.9%. 1000 milliLiter(s) IV Continuous <Continuous>  traMADol 50 milliGRAM(s) Oral every 8 hours PRN  vancomycin  IVPB      vancomycin  IVPB 1000 milliGRAM(s) IV Intermittent every 12 hours    Drug Dosing Weight  Height (cm): 157.48 (06 Apr 2018 00:00)  Weight (kg): 65.8 (06 Apr 2018 00:00)  BMI (kg/m2): 26.5 (06 Apr 2018 00:00)  BSA (m2): 1.67 (06 Apr 2018 00:00)    CENTRAL LINE: [ ] YES [x ] NO  LOCATION:   DATE INSERTED:  REMOVE: [ ] YES [ ] NO  EXPLAIN:    CHANG: [ ] YES [x ] NO    DATE INSERTED:  REMOVE:  [ ] YES [ ] NO  EXPLAIN:    A-LINE:  [ ] YES [x ] NO  LOCATION:   DATE INSERTED:  REMOVE:  [ ] YES [ ] NO  EXPLAIN:    PMH -reviewed admission note, no change since admission  PAST MEDICAL & SURGICAL HISTORY:  Essential hypertension  Type 2 diabetes mellitus without complication  HLD (hyperlipidemia)  H/O tubal ligation      ICU Vital Signs Last 24 Hrs  T(C): 38.7 (06 Apr 2018 18:00), Max: 38.7 (06 Apr 2018 18:00)  T(F): 101.6 (06 Apr 2018 18:00), Max: 101.6 (06 Apr 2018 18:00)  HR: 92 (06 Apr 2018 18:00) (74 - 105)  BP: 134/76 (06 Apr 2018 18:00) (80/51 - 134/76)  BP(mean): 91 (06 Apr 2018 18:00) (57 - 91)  ABP: --  ABP(mean): --  RR: 20 (06 Apr 2018 18:00) (17 - 25)  SpO2: 97% (06 Apr 2018 18:00) (95% - 100%)            04-05 @ 07:01  -  04-06 @ 07:00  --------------------------------------------------------  IN: 480 mL / OUT: 850 mL / NET: -370 mL            PHYSICAL EXAM:    GENERAL: [ ]NAD, [ ]well-groomed, [ ]well-developed  HEAD:  [x]Atraumatic, [ x]Normocephalic  EYES: [x ]EOMI, [xPERRLA, [ ]conjunctiva and sclera clear  ENMT: [ ]No tonsillar erythema, exudates, or enlargement; [x ]Moist mucous membranes, [ ]Good dentition, [ ]No lesions  NECK: [x ]Supple, normal appearance, [ ]No JVD; [ ]Normal thyroid; [ ]Trachea midline  NERVOUS SYSTEM:  [x ]Alert & Oriented X3, [ x]Good concentration; [ ]Motor Strength 5/5 B/L upper and lower extremities; [ ]DTRs 2+ intact and symmetric  CHEST/LUNG: [ ]No chest deformity; [ ]Normal percussion bilaterally; [ ]No rales, rhonchi, wheezing   HEART: [ ]Regular rate and rhythm; [ ]No murmurs, rubs, or gallops  ABDOMEN: [ ]Soft, Nontender, Nondistended; [ ]Bowel sounds present  EXTREMITIES:  [ ]2+ Peripheral Pulses, [ ]No clubbing, cyanosis, or edema  LYMPH: [ ]No lymphadenopathy noted  SKIN: [ ]No rashes or lesions; [ ]Good capillary refill      LABS:  CBC Full  -  ( 06 Apr 2018 18:16 )  WBC Count : 16.9 K/uL  Hemoglobin : 9.5 g/dL  Hematocrit : 28.3 %  Platelet Count - Automated : 300 K/uL  Mean Cell Volume : 87.6 fl  Mean Cell Hemoglobin : 29.5 pg  Mean Cell Hemoglobin Concentration : 33.7 gm/dL  Auto Neutrophil # : x  Auto Lymphocyte # : x  Auto Monocyte # : x  Auto Eosinophil # : x  Auto Basophil # : x  Auto Neutrophil % : x  Auto Lymphocyte % : x  Auto Monocyte % : x  Auto Eosinophil % : x  Auto Basophil % : x    04-06    132<L>  |  102  |  30<H>  ----------------------------<  143<H>  4.5   |  23  |  0.67    Ca    6.6<L>      06 Apr 2018 06:32  Phos  4.1     04-05  Mg     1.7     04-05    TPro  5.3<L>  /  Alb  x   /  TBili  x   /  DBili  x   /  AST  x   /  ALT  x   /  AlkPhos  x   04-05    PT/INR - ( 06 Apr 2018 13:50 )   PT: 15.2 sec;   INR: 1.39 ratio             Culture Results:   No growth to date. (04-04 @ 23:42)  Culture Results:   No growth to date. (04-04 @ 23:42)  Culture Results:   10,000 - 49,000 CFU/mL Enterococcus faecalis (04-04 @ 22:40)      RADIOLOGY & ADDITIONAL STUDIES REVIEWED:   < from: MR Lumbar Spine w/ IV Cont (04.05.18 @ 20:12) >  FINDINGS:  There is a large left dorsal peripherally enhancing epidural collection   extending from approximately lower L1 through mid L4 levels, measuring   approximately 9.4 cm in CC dimension. It measures up to 1.6 x 0.9 cm in   TR by AP dimensions. There is mass effect on the thecal sac, which is   displaced anteriorly and to the right; the worse levels are at L2-L3 and   L3-L4, where this causes moderate to severe compression of the thecal sac   with near complete CSF effacement.    There is adjacent left paravertebral soft tissue thickening and   inflammatory signal, with mild lifting and thickening of the left psoas   muscle.    Mild diffuse hypointense T1 signal of the vertebral bodies is present.   There is mild abnormal T2/STIR hyperintensity of the L4 and L5 vertebral   bodies, with abnormal enhancement. Additionally, there is STIR   hyperintense signal of the L4-L5 intervertebral disc. These findings are   concerning for discitis/osteomyelitis of L4-L5.    Anterior to the L4-L5 disc and vertebral bodies, thereis a small focus   of T2 hyperintensity with a small apparent fluid collection/abscess   measuring 5 mm.    The conus medullaris is normal in signal and position. There is no   abnormal thickening or enhancement of the cauda equina nerve roots.    Mild dextroconvex curvature of the lumbar spine, with apex at L3.   Vertebral body heights are preserved. There is no fracture. There is   multilevel disc desiccation, with loss of height worse at L4-L5.    Findings by level;    T12-L1: No significant disc herniation, central canal stenosis or   neuroforaminal narrowing.    L1-L2: Mild diffuse disc bulge flattens the ventral thecal sac, without   intervening significant central canal stenosis or neural foraminal   narrowing.    L2-L3: The epidural collection, along with posterior disc bulge, and mild   facet arthropathy contributes to moderate-severe compression of the   thecal sac. No significant neuroforaminal narrowing.    L3-L4: The epidural collection, mild posterior disc bulge, mild facet   arthropathy with a small left facet joint effusion contributes to   moderate compression of the thecal sac. No significant neuroforaminal   narrowing.    L4-L5: Posterior disc osteophyte complex, facet arthropathy with moderate   right and trace left facet joint effusions and ligamenta flava thickening   contributes to mild central canal stenosis and moderate bilateral neural   foraminal narrowing. Moderate facet joint effusion extends   posterolaterally from the joints into the paravertebral soft tissues,   demonstrates peripheral enhancement.    L5-S1: Mild posterior disc bulge, facet arthropathy and ligamenta flava   thickening contribute to moderate right neural foraminal narrowing. No   significant central canal stenosis.    Retroaorticleft renal vein is incidentally noted.    IMPRESSION:  Large epidural fluid collection suggestive of an abscess in the lumbar   spine from lower L1 through mid L4 levels, with mass effect on the thecal   sac as described. Adjacent left paravertebral soft tissue thickening and   inflammatory signal.    Findings suggests discitis and osteomyelitis of L4-L5. Possible small   anterior epidural abscess at L4-L5 disc level.    Moderate right facet joint effusion at L4-L5 with extra-articular   extension the paravertebral soft tissues demonstrates peripheral   enhancement; an infection/abscess of the right L4-L5 facet joint is not   excluded.    Findings were discussed by Dr. Vale Fontaine M.D. of [facility] with CORNELIA Gallegos. Initial report created on 4/5/2018 9:07:42 PM EDT.                SOWMYA BELL M.D., ATTENDING RADIOLOGIST  This document has been electronically signed. Apr 6 2018 11:24AM    < end of copied text >    [ ]GOALS OF CARE DISCUSSION WITH PATIENT/FAMILY/PROXY:    CRITICAL CARE TIME SPENT: 35 minutes INTERVAL HPI/OVERNIGHT EVENTS: 69 y/o female with HTN, DM II a/w back pain found to have large epidural abscess at L3-4, subsequently had hematemesis with acute blood loss anemia. EGD performed today shows large ulcer and erosions. She was febrile this evening with rigors, cultures drawn, Cefepime added   ROS- communicated with pt in common spoken language of [place]ic.- c/o low back pain, subsides partially with morphine but wakes with >10/10 pain, denies SOB, chest pain or abd pain at this time.     PRESSORS: [ ] YES [x} NO  WHICH:      Antimicrobial:  cefepime  IVPB      cefepime  IVPB 2000 milliGRAM(s) IV Intermittent once  vancomycin  IVPB      vancomycin  IVPB 1000 milliGRAM(s) IV Intermittent every 12 hours      Other:  acetaminophen   Tablet 650 milliGRAM(s) Oral every 6 hours PRN  acetaminophen   Tablet. 650 milliGRAM(s) Oral every 6 hours PRN  atorvastatin 40 milliGRAM(s) Oral at bedtime  chlorhexidine 4% Liquid 1 Application(s) Topical <User Schedule>  lidocaine   Patch 1 Patch Transdermal daily  morphine  - Injectable 1 milliGRAM(s) IV Push every 6 hours PRN  pantoprazole Infusion 8 mG/Hr IV Continuous <Continuous>  sodium chloride 0.9%. 1000 milliLiter(s) IV Continuous <Continuous>  traMADol 50 milliGRAM(s) Oral every 8 hours PRN    acetaminophen   Tablet 650 milliGRAM(s) Oral every 6 hours PRN  acetaminophen   Tablet. 650 milliGRAM(s) Oral every 6 hours PRN  atorvastatin 40 milliGRAM(s) Oral at bedtime  cefepime  IVPB      cefepime  IVPB 2000 milliGRAM(s) IV Intermittent once  chlorhexidine 4% Liquid 1 Application(s) Topical <User Schedule>  lidocaine   Patch 1 Patch Transdermal daily  morphine  - Injectable 1 milliGRAM(s) IV Push every 6 hours PRN  pantoprazole Infusion 8 mG/Hr IV Continuous <Continuous>  sodium chloride 0.9%. 1000 milliLiter(s) IV Continuous <Continuous>  traMADol 50 milliGRAM(s) Oral every 8 hours PRN  vancomycin  IVPB      vancomycin  IVPB 1000 milliGRAM(s) IV Intermittent every 12 hours    Drug Dosing Weight  Height (cm): 157.48 (06 Apr 2018 00:00)  Weight (kg): 65.8 (06 Apr 2018 00:00)  BMI (kg/m2): 26.5 (06 Apr 2018 00:00)  BSA (m2): 1.67 (06 Apr 2018 00:00)    CENTRAL LINE: [ ] YES [x ] NO  LOCATION:   DATE INSERTED:  REMOVE: [ ] YES [ ] NO  EXPLAIN:    CHANG: [ ] YES [x ] NO    DATE INSERTED:  REMOVE:  [ ] YES [ ] NO  EXPLAIN:    A-LINE:  [ ] YES [x ] NO  LOCATION:   DATE INSERTED:  REMOVE:  [ ] YES [ ] NO  EXPLAIN:    PMH -reviewed admission note, no change since admission  PAST MEDICAL & SURGICAL HISTORY:  Essential hypertension  Type 2 diabetes mellitus without complication  HLD (hyperlipidemia)  H/O tubal ligation      ICU Vital Signs Last 24 Hrs  T(C): 38.7 (06 Apr 2018 18:00), Max: 38.7 (06 Apr 2018 18:00)  T(F): 101.6 (06 Apr 2018 18:00), Max: 101.6 (06 Apr 2018 18:00)  HR: 92 (06 Apr 2018 18:00) (74 - 105)  BP: 134/76 (06 Apr 2018 18:00) (80/51 - 134/76)  BP(mean): 91 (06 Apr 2018 18:00) (57 - 91)  ABP: --  ABP(mean): --  RR: 20 (06 Apr 2018 18:00) (17 - 25)  SpO2: 97% (06 Apr 2018 18:00) (95% - 100%)            04-05 @ 07:01  -  04-06 @ 07:00  --------------------------------------------------------  IN: 480 mL / OUT: 850 mL / NET: -370 mL            PHYSICAL EXAM:    GENERAL: [ ]NAD, [ ]well-groomed, [ ]well-developed  HEAD:  [x]Atraumatic, [ x]Normocephalic  EYES: [x ]EOMI, [xPERRLA, [x ]conjunctiva and sclera clear  ENMT: [ ]No tonsillar erythema, exudates, or enlargement; [x ]Moist mucous membranes, [ ]Good dentition, [ ]No lesions  NECK: [x ]Supple, normal appearance, [ ]No JVD; [ ]Normal thyroid; [ ]Trachea midline  NERVOUS SYSTEM:  [x ]Alert & Oriented X3, [ x]Good concentration; [x ]Motor Strength 4/5 B/L upper and lower extremities; [ x]DTRs 2+ intact and symmetric  CHEST/LUNG: [x ]No chest deformity; [x ]No rales, rhonchi, wheezing   HEART: [ x]Regular rate and rhythm; [ ]No murmurs, rubs, or gallops  ABDOMEN: [ x]Soft, Nontender, Nondistended; [x ]Bowel sounds present  EXTREMITIES:  [ ]2+ Peripheral Pulses, [ ]No clubbing, cyanosis, or edema  LYMPH: [ ]No lymphadenopathy noted  SKIN: [ x]No rashes or lesions; [x ]Good capillary refill      LABS:  CBC Full  -  ( 06 Apr 2018 18:16 )  WBC Count : 16.9 K/uL  Hemoglobin : 9.5 g/dL  Hematocrit : 28.3 %  Platelet Count - Automated : 300 K/uL  Mean Cell Volume : 87.6 fl  Mean Cell Hemoglobin : 29.5 pg  Mean Cell Hemoglobin Concentration : 33.7 gm/dL  Auto Neutrophil # : x  Auto Lymphocyte # : x  Auto Monocyte # : x  Auto Eosinophil # : x  Auto Basophil # : x  Auto Neutrophil % : x  Auto Lymphocyte % : x  Auto Monocyte % : x  Auto Eosinophil % : x  Auto Basophil % : x    04-06    132<L>  |  102  |  30<H>  ----------------------------<  143<H>  4.5   |  23  |  0.67    Ca    6.6<L>      06 Apr 2018 06:32  Phos  4.1     04-05  Mg     1.7     04-05    TPro  5.3<L>  /  Alb  x   /  TBili  x   /  DBili  x   /  AST  x   /  ALT  x   /  AlkPhos  x   04-05    PT/INR - ( 06 Apr 2018 13:50 )   PT: 15.2 sec;   INR: 1.39 ratio             Culture Results:   No growth to date. (04-04 @ 23:42)  Culture Results:   No growth to date. (04-04 @ 23:42)  Culture Results:   10,000 - 49,000 CFU/mL Enterococcus faecalis (04-04 @ 22:40)      RADIOLOGY & ADDITIONAL STUDIES REVIEWED:   < from: MR Lumbar Spine w/ IV Cont (04.05.18 @ 20:12) >  FINDINGS:  There is a large left dorsal peripherally enhancing epidural collection   extending from approximately lower L1 through mid L4 levels, measuring   approximately 9.4 cm in CC dimension. It measures up to 1.6 x 0.9 cm in   TR by AP dimensions. There is mass effect on the thecal sac, which is   displaced anteriorly and to the right; the worse levels are at L2-L3 and   L3-L4, where this causes moderate to severe compression of the thecal sac   with near complete CSF effacement.    There is adjacent left paravertebral soft tissue thickening and   inflammatory signal, with mild lifting and thickening of the left psoas   muscle.    Mild diffuse hypointense T1 signal of the vertebral bodies is present.   There is mild abnormal T2/STIR hyperintensity of the L4 and L5 vertebral   bodies, with abnormal enhancement. Additionally, there is STIR   hyperintense signal of the L4-L5 intervertebral disc. These findings are   concerning for discitis/osteomyelitis of L4-L5.    Anterior to the L4-L5 disc and vertebral bodies, thereis a small focus   of T2 hyperintensity with a small apparent fluid collection/abscess   measuring 5 mm.    The conus medullaris is normal in signal and position. There is no   abnormal thickening or enhancement of the cauda equina nerve roots.    Mild dextroconvex curvature of the lumbar spine, with apex at L3.   Vertebral body heights are preserved. There is no fracture. There is   multilevel disc desiccation, with loss of height worse at L4-L5.    Findings by level;    T12-L1: No significant disc herniation, central canal stenosis or   neuroforaminal narrowing.    L1-L2: Mild diffuse disc bulge flattens the ventral thecal sac, without   intervening significant central canal stenosis or neural foraminal   narrowing.    L2-L3: The epidural collection, along with posterior disc bulge, and mild   facet arthropathy contributes to moderate-severe compression of the   thecal sac. No significant neuroforaminal narrowing.    L3-L4: The epidural collection, mild posterior disc bulge, mild facet   arthropathy with a small left facet joint effusion contributes to   moderate compression of the thecal sac. No significant neuroforaminal   narrowing.    L4-L5: Posterior disc osteophyte complex, facet arthropathy with moderate   right and trace left facet joint effusions and ligamenta flava thickening   contributes to mild central canal stenosis and moderate bilateral neural   foraminal narrowing. Moderate facet joint effusion extends   posterolaterally from the joints into the paravertebral soft tissues,   demonstrates peripheral enhancement.    L5-S1: Mild posterior disc bulge, facet arthropathy and ligamenta flava   thickening contribute to moderate right neural foraminal narrowing. No   significant central canal stenosis.    Retroaorticleft renal vein is incidentally noted.    IMPRESSION:  Large epidural fluid collection suggestive of an abscess in the lumbar   spine from lower L1 through mid L4 levels, with mass effect on the thecal   sac as described. Adjacent left paravertebral soft tissue thickening and   inflammatory signal.    Findings suggests discitis and osteomyelitis of L4-L5. Possible small   anterior epidural abscess at L4-L5 disc level.    Moderate right facet joint effusion at L4-L5 with extra-articular   extension the paravertebral soft tissues demonstrates peripheral   enhancement; an infection/abscess of the right L4-L5 facet joint is not   excluded.    Findings were discussed by Dr. Vale Fontaine M.D. of [facility] with CORNELIA Gallegos. Initial report created on 4/5/2018 9:07:42 PM EDT.                SOWMYA BELL M.D., ATTENDING RADIOLOGIST  This document has been electronically signed. Apr 6 2018 11:24AM    < end of copied text >    [x ]GOALS OF CARE DISCUSSION WITH PATIENT/FAMILY/PROXY: family updated at bedside     CRITICAL CARE TIME SPENT: 45 minutes

## 2018-04-06 NOTE — PROGRESS NOTE ADULT - PROBLEM SELECTOR PLAN 1
likely secondary to NSAID use   continue with protonix infusion likely secondary to NSAID use   continue with protonix infusion  EGD with large ulcer and erosions noted keep NPO for now  serial CBC  replace Ca++

## 2018-04-06 NOTE — PROGRESS NOTE ADULT - PROBLEM SELECTOR PLAN 4
not on any medication at this time  -will give diabetic diet  -f/u A1c  -will not do FS or give HSS unless A1c is elevated
fingersticks q 6 w ISS  goal <200  pt NPO for now  gentle hydration

## 2018-04-06 NOTE — CONSULT NOTE ADULT - ASSESSMENT
1.	Lumbar epidural abscess with discitis  2.	Lumbar cellulitis/ myositis  3.	Leukocytosis  4.	?Malignancy/ hemorrhagic gastric ulcer  ·	cont vanco 1gm IV q12h  ·	awaiting BC final results  ·	needs transfer to tertiary facility 1.	Lumbar epidural abscess with discitis - needs drainage  2.	Lumbar cellulitis/ myositis  3.	Leukocytosis  4.	?Malignancy/ hemorrhagic gastric ulcer  ·	cont vanco 1gm IV q12h  ·	awaiting BC final results  ·	needs transfer to tertiary facility

## 2018-04-06 NOTE — PROGRESS NOTE ADULT - PROBLEM SELECTOR PLAN 1
likely secondary to NSAID use   s/p 2 PRBC overnight   CBC in AM noted Hb of 6.8  will transfuse another unit of PRBC   continue with protonix infusion   Keep NPO   Discussed with Dr. Andres GI , no plan of EGD , hemodynamic stabilization and serial CBC. likely secondary to NSAID use   s/p 2 PRBC overnight   CBC in AM noted Hb of 6.8  transfuse 2 unit of PRBC   continue with protonix infusion   Keep NPO   Discussed with Dr. Andres GI , no plan of EGD , hemodynamic stabilization and serial CBC.

## 2018-04-06 NOTE — ACUTE INTERFACILITY TRANSFER NOTE - PLAN OF CARE
complete resolution of infection patient presented with severe back pain   epidural abscess of 10*12 mm at L2- L3 level  ortho - spine surgery consulted and advised to transfer patient to [facility] for possible surgical intervention patient had episode of hematemesis at hospital   Hb of 5.9 noted   total 4 units of PRBC and 1 unit of platelets given   EGD done and large enteral ulcer noted   CT with IV contrast was advised by GI doctor to rule out underlying pathology

## 2018-04-07 LAB
ANION GAP SERPL CALC-SCNC: 9 MMOL/L — SIGNIFICANT CHANGE UP (ref 5–17)
BASOPHILS # BLD AUTO: 0.2 K/UL — SIGNIFICANT CHANGE UP (ref 0–0.2)
BASOPHILS NFR BLD AUTO: 1.1 % — SIGNIFICANT CHANGE UP (ref 0–2)
BUN SERPL-MCNC: 13 MG/DL — SIGNIFICANT CHANGE UP (ref 7–18)
CALCIUM SERPL-MCNC: 7.7 MG/DL — LOW (ref 8.4–10.5)
CHLORIDE SERPL-SCNC: 99 MMOL/L — SIGNIFICANT CHANGE UP (ref 96–108)
CO2 SERPL-SCNC: 22 MMOL/L — SIGNIFICANT CHANGE UP (ref 22–31)
CREAT SERPL-MCNC: 0.58 MG/DL — SIGNIFICANT CHANGE UP (ref 0.5–1.3)
EOSINOPHIL # BLD AUTO: 0.1 K/UL — SIGNIFICANT CHANGE UP (ref 0–0.5)
EOSINOPHIL NFR BLD AUTO: 0.6 % — SIGNIFICANT CHANGE UP (ref 0–6)
GLUCOSE SERPL-MCNC: 120 MG/DL — HIGH (ref 70–99)
HCT VFR BLD CALC: 28.9 % — LOW (ref 34.5–45)
HCT VFR BLD CALC: 29.2 % — LOW (ref 34.5–45)
HCT VFR BLD CALC: 30.6 % — LOW (ref 34.5–45)
HGB BLD-MCNC: 10.2 G/DL — LOW (ref 11.5–15.5)
HGB BLD-MCNC: 9.6 G/DL — LOW (ref 11.5–15.5)
HGB BLD-MCNC: 9.7 G/DL — LOW (ref 11.5–15.5)
LYMPHOCYTES # BLD AUTO: 1.7 K/UL — SIGNIFICANT CHANGE UP (ref 1–3.3)
LYMPHOCYTES # BLD AUTO: 10.4 % — LOW (ref 13–44)
MCHC RBC-ENTMCNC: 29.1 PG — SIGNIFICANT CHANGE UP (ref 27–34)
MCHC RBC-ENTMCNC: 29.2 PG — SIGNIFICANT CHANGE UP (ref 27–34)
MCHC RBC-ENTMCNC: 29.4 PG — SIGNIFICANT CHANGE UP (ref 27–34)
MCHC RBC-ENTMCNC: 33.1 GM/DL — SIGNIFICANT CHANGE UP (ref 32–36)
MCHC RBC-ENTMCNC: 33.3 GM/DL — SIGNIFICANT CHANGE UP (ref 32–36)
MCHC RBC-ENTMCNC: 33.4 GM/DL — SIGNIFICANT CHANGE UP (ref 32–36)
MCV RBC AUTO: 87.5 FL — SIGNIFICANT CHANGE UP (ref 80–100)
MCV RBC AUTO: 87.9 FL — SIGNIFICANT CHANGE UP (ref 80–100)
MCV RBC AUTO: 87.9 FL — SIGNIFICANT CHANGE UP (ref 80–100)
MONOCYTES # BLD AUTO: 1.3 K/UL — HIGH (ref 0–0.9)
MONOCYTES NFR BLD AUTO: 8 % — SIGNIFICANT CHANGE UP (ref 2–14)
NEUTROPHILS # BLD AUTO: 13.3 K/UL — HIGH (ref 1.8–7.4)
NEUTROPHILS NFR BLD AUTO: 80 % — HIGH (ref 43–77)
PLATELET # BLD AUTO: 337 K/UL — SIGNIFICANT CHANGE UP (ref 150–400)
PLATELET # BLD AUTO: 339 K/UL — SIGNIFICANT CHANGE UP (ref 150–400)
PLATELET # BLD AUTO: 348 K/UL — SIGNIFICANT CHANGE UP (ref 150–400)
POTASSIUM SERPL-MCNC: 3.9 MMOL/L — SIGNIFICANT CHANGE UP (ref 3.5–5.3)
POTASSIUM SERPL-SCNC: 3.9 MMOL/L — SIGNIFICANT CHANGE UP (ref 3.5–5.3)
RBC # BLD: 3.31 M/UL — LOW (ref 3.8–5.2)
RBC # BLD: 3.32 M/UL — LOW (ref 3.8–5.2)
RBC # BLD: 3.48 M/UL — LOW (ref 3.8–5.2)
RBC # FLD: 13.8 % — SIGNIFICANT CHANGE UP (ref 10.3–14.5)
RBC # FLD: 14 % — SIGNIFICANT CHANGE UP (ref 10.3–14.5)
RBC # FLD: 14.3 % — SIGNIFICANT CHANGE UP (ref 10.3–14.5)
SODIUM SERPL-SCNC: 130 MMOL/L — LOW (ref 135–145)
VANCOMYCIN TROUGH SERPL-MCNC: 9.8 UG/ML — LOW (ref 10–20)
WBC # BLD: 16.2 K/UL — HIGH (ref 3.8–10.5)
WBC # BLD: 16.3 K/UL — HIGH (ref 3.8–10.5)
WBC # BLD: 16.7 K/UL — HIGH (ref 3.8–10.5)
WBC # FLD AUTO: 16.2 K/UL — HIGH (ref 3.8–10.5)
WBC # FLD AUTO: 16.3 K/UL — HIGH (ref 3.8–10.5)
WBC # FLD AUTO: 16.7 K/UL — HIGH (ref 3.8–10.5)

## 2018-04-07 RX ORDER — MORPHINE SULFATE 50 MG/1
1 CAPSULE, EXTENDED RELEASE ORAL EVERY 4 HOURS
Qty: 0 | Refills: 0 | Status: DISCONTINUED | OUTPATIENT
Start: 2018-04-07 | End: 2018-04-08

## 2018-04-07 RX ORDER — VANCOMYCIN HCL 1 G
0 VIAL (EA) INTRAVENOUS
Qty: 0 | Refills: 0 | COMMUNITY
Start: 2018-04-07

## 2018-04-07 RX ORDER — LOSARTAN/HYDROCHLOROTHIAZIDE 100MG-25MG
1 TABLET ORAL
Qty: 0 | Refills: 0 | COMMUNITY

## 2018-04-07 RX ORDER — CEFEPIME 1 G/1
0 INJECTION, POWDER, FOR SOLUTION INTRAMUSCULAR; INTRAVENOUS
Qty: 0 | Refills: 0 | COMMUNITY
Start: 2018-04-07

## 2018-04-07 RX ORDER — ACETAMINOPHEN 500 MG
650 TABLET ORAL EVERY 6 HOURS
Qty: 0 | Refills: 0 | Status: DISCONTINUED | OUTPATIENT
Start: 2018-04-07 | End: 2018-04-08

## 2018-04-07 RX ORDER — INSULIN LISPRO 100/ML
VIAL (ML) SUBCUTANEOUS
Qty: 0 | Refills: 0 | Status: DISCONTINUED | OUTPATIENT
Start: 2018-04-07 | End: 2018-04-08

## 2018-04-07 RX ORDER — VANCOMYCIN HCL 1 G
1250 VIAL (EA) INTRAVENOUS EVERY 12 HOURS
Qty: 0 | Refills: 0 | Status: DISCONTINUED | OUTPATIENT
Start: 2018-04-07 | End: 2018-04-08

## 2018-04-07 RX ORDER — ACETAMINOPHEN 500 MG
2 TABLET ORAL
Qty: 0 | Refills: 0 | COMMUNITY
Start: 2018-04-07

## 2018-04-07 RX ORDER — INSULIN LISPRO 100/ML
VIAL (ML) SUBCUTANEOUS AT BEDTIME
Qty: 0 | Refills: 0 | Status: DISCONTINUED | OUTPATIENT
Start: 2018-04-07 | End: 2018-04-08

## 2018-04-07 RX ORDER — LIDOCAINE 4 G/100G
0 CREAM TOPICAL
Qty: 0 | Refills: 0 | COMMUNITY
Start: 2018-04-07

## 2018-04-07 RX ORDER — PANTOPRAZOLE SODIUM 20 MG/1
0 TABLET, DELAYED RELEASE ORAL
Qty: 0 | Refills: 0 | COMMUNITY
Start: 2018-04-07

## 2018-04-07 RX ADMIN — MORPHINE SULFATE 2 MILLIGRAM(S): 50 CAPSULE, EXTENDED RELEASE ORAL at 08:30

## 2018-04-07 RX ADMIN — PANTOPRAZOLE SODIUM 10 MG/HR: 20 TABLET, DELAYED RELEASE ORAL at 23:10

## 2018-04-07 RX ADMIN — CEFEPIME 100 MILLIGRAM(S): 1 INJECTION, POWDER, FOR SOLUTION INTRAMUSCULAR; INTRAVENOUS at 05:07

## 2018-04-07 RX ADMIN — Medication 250 MILLIGRAM(S): at 05:06

## 2018-04-07 RX ADMIN — Medication 166.67 MILLIGRAM(S): at 17:58

## 2018-04-07 RX ADMIN — CEFEPIME 100 MILLIGRAM(S): 1 INJECTION, POWDER, FOR SOLUTION INTRAMUSCULAR; INTRAVENOUS at 13:44

## 2018-04-07 RX ADMIN — LIDOCAINE 1 PATCH: 4 CREAM TOPICAL at 11:51

## 2018-04-07 RX ADMIN — PANTOPRAZOLE SODIUM 10 MG/HR: 20 TABLET, DELAYED RELEASE ORAL at 05:06

## 2018-04-07 RX ADMIN — PANTOPRAZOLE SODIUM 10 MG/HR: 20 TABLET, DELAYED RELEASE ORAL at 18:07

## 2018-04-07 RX ADMIN — Medication 0: at 21:11

## 2018-04-07 RX ADMIN — CEFEPIME 100 MILLIGRAM(S): 1 INJECTION, POWDER, FOR SOLUTION INTRAMUSCULAR; INTRAVENOUS at 21:11

## 2018-04-07 RX ADMIN — Medication 2: at 17:58

## 2018-04-07 RX ADMIN — MORPHINE SULFATE 2 MILLIGRAM(S): 50 CAPSULE, EXTENDED RELEASE ORAL at 08:19

## 2018-04-07 RX ADMIN — LIDOCAINE 1 PATCH: 4 CREAM TOPICAL at 22:29

## 2018-04-07 RX ADMIN — Medication 650 MILLIGRAM(S): at 04:15

## 2018-04-07 RX ADMIN — MORPHINE SULFATE 2 MILLIGRAM(S): 50 CAPSULE, EXTENDED RELEASE ORAL at 11:52

## 2018-04-07 RX ADMIN — CHLORHEXIDINE GLUCONATE 1 APPLICATION(S): 213 SOLUTION TOPICAL at 11:52

## 2018-04-07 RX ADMIN — MORPHINE SULFATE 2 MILLIGRAM(S): 50 CAPSULE, EXTENDED RELEASE ORAL at 12:20

## 2018-04-07 NOTE — PROGRESS NOTE ADULT - ASSESSMENT
68F from home PMH HTN, DM, HLD who comes to hospital for 2 weeks of back pain. Patient dropped Hb , and had one episode of coffee ground emesis . Pt received 1 U PRBC , Hb remained 5.9 after 1 U PRBC. Pt blood pressure dropped to 89/54. ICU was consulted for the concern of hypotension in the setting of GI bleed.      Problem/Plan - 1:  ·  Problem: GI bleed.  Plan: likely secondary to NSAID use   s/p 2 PRBC overnight   CBC in AM noted Hb of 6.8  transfuse 2 unit of PRBC   continue with protonix infusion   Keep NPO   Discussed with Dr. Mckenna JACOBO , no plan of EGD , hemodynamic stabilization and serial CBC.     Problem/Plan - 2:  ·  Problem: Epidural abscess.  Plan: ortho spine consulted and recommended to transfer patient to [facility] for possible surgery   Continue with Vancomycin   ID Dr. Bowers.  ortho surgery Dr. Aquino.     Problem/Plan - 3:  ·  Problem: Need for prophylactic measure.  Plan: on protonix infusion and holding heparin - GI bleed.

## 2018-04-07 NOTE — PHYSICAL THERAPY INITIAL EVALUATION ADULT - GENERAL OBSERVATIONS, REHAB EVAL
Pt. was seen for EVALUATION today. Pt. received supine in bed, Cambodian speaking, NAD, +CM, +IV, family at bedside, Pt. A&O x 3.

## 2018-04-07 NOTE — PROGRESS NOTE ADULT - SUBJECTIVE AND OBJECTIVE BOX
68y Female    Meds:  cefepime  IVPB      cefepime  IVPB 2000 milliGRAM(s) IV Intermittent every 8 hours  vancomycin  IVPB 1250 milliGRAM(s) IV Intermittent every 12 hours    Allergies    No Known Allergies    Intolerances        VITALS:  Vital Signs Last 24 Hrs  T(C): 37.3 (07 Apr 2018 13:00), Max: 38.5 (07 Apr 2018 04:00)  T(F): 99.2 (07 Apr 2018 13:00), Max: 101.3 (07 Apr 2018 04:00)  HR: 92 (07 Apr 2018 16:00) (75 - 103)  BP: 139/74 (07 Apr 2018 16:00) (85/50 - 139/87)  BP(mean): 89 (07 Apr 2018 16:00) (57 - 99)  RR: 26 (07 Apr 2018 16:00) (18 - 26)  SpO2: 99% (07 Apr 2018 16:00) (94% - 100%)    LABS/DIAGNOSTIC TESTS:                          9.6    16.2  )-----------( 339      ( 07 Apr 2018 17:06 )             28.9     Lactate, Blood: 0.8 mmol/L (04-06 @ 18:32)      04-07    130<L>  |  99  |  13  ----------------------------<  120<H>  3.9   |  22  |  0.58    Ca    7.7<L>      07 Apr 2018 03:55            CULTURES: .Blood Blood  04-04 @ 23:42   No growth to date.  --  --      .Urine Clean Catch (Midstream)  04-04 @ 22:40   10,000 - 49,000 CFU/mL Enterococcus faecalis  --  Enterococcus faecalis                RADIOLOGY:      ROS:  [  ] UNABLE TO ELICIT 68y Female who remains in the ICU, she c/o low back pain still and had a fever to 101.3 today, she is still waiting for a bed at [facility] so she can get drainage of epidural abscess. she denies having any loss of bowel or bladder control, she has some pain radiating down her thighs , no nausea, vomiting or diarrhea. Maxipime was started on her as she had the fever spike.    Meds:  cefepime  IVPB      cefepime  IVPB 2000 milliGRAM(s) IV Intermittent every 8 hours  vancomycin  IVPB 1250 milliGRAM(s) IV Intermittent every 12 hours    Allergies    No Known Allergies    Intolerances        VITALS:  Vital Signs Last 24 Hrs  T(C): 37.3 (07 Apr 2018 13:00), Max: 38.5 (07 Apr 2018 04:00)  T(F): 99.2 (07 Apr 2018 13:00), Max: 101.3 (07 Apr 2018 04:00)  HR: 92 (07 Apr 2018 16:00) (75 - 103)  BP: 139/74 (07 Apr 2018 16:00) (85/50 - 139/87)  BP(mean): 89 (07 Apr 2018 16:00) (57 - 99)  RR: 26 (07 Apr 2018 16:00) (18 - 26)  SpO2: 99% (07 Apr 2018 16:00) (94% - 100%)    LABS/DIAGNOSTIC TESTS:                          9.6    16.2  )-----------( 339      ( 07 Apr 2018 17:06 )             28.9     Lactate, Blood: 0.8 mmol/L (04-06 @ 18:32)      04-07    130<L>  |  99  |  13  ----------------------------<  120<H>  3.9   |  22  |  0.58    Ca    7.7<L>      07 Apr 2018 03:55            CULTURES: .Blood Blood  04-04 @ 23:42   No growth to date.  --  --      .Urine Clean Catch (Midstream)  04-04 @ 22:40   10,000 - 49,000 CFU/mL Enterococcus faecalis  --  Enterococcus faecalis                RADIOLOGY:      ROS:  [  ] UNABLE TO ELICIT

## 2018-04-07 NOTE — PHYSICAL THERAPY INITIAL EVALUATION ADULT - ADDITIONAL COMMENTS
As per pt's family, pt was able to ambulate (I) with no AD and was (I) with all ADL's indoors and outdoors

## 2018-04-07 NOTE — PHYSICAL THERAPY INITIAL EVALUATION ADULT - DIAGNOSIS, PT EVAL
Decreased strength, Abnormal gait pattern secondary to pain, decreased cardiorespiratory endurance due to prolonged bed rest

## 2018-04-07 NOTE — PROGRESS NOTE ADULT - SUBJECTIVE AND OBJECTIVE BOX
INTERVAL HPI/OVERNIGHT EVENTS: no overnight events   PRESSORS: [ ] YES [ x] NO  WHICH:      Antimicrobial:  cefepime  IVPB      cefepime  IVPB 2000 milliGRAM(s) IV Intermittent every 8 hours  vancomycin  IVPB      vancomycin  IVPB 1000 milliGRAM(s) IV Intermittent every 12 hours    Cardiovascular:    Pulmonary:    Hematalogic:    Other:  acetaminophen  Suppository 650 milliGRAM(s) Rectal every 6 hours PRN  chlorhexidine 4% Liquid 1 Application(s) Topical <User Schedule>  lactated ringers. 1000 milliLiter(s) IV Continuous <Continuous>  lidocaine   Patch 1 Patch Transdermal daily  morphine  - Injectable 2 milliGRAM(s) IV Push every 4 hours PRN  morphine  - Injectable 1 milliGRAM(s) IV Push every 6 hours PRN  pantoprazole Infusion 8 mG/Hr IV Continuous <Continuous>    acetaminophen  Suppository 650 milliGRAM(s) Rectal every 6 hours PRN  cefepime  IVPB      cefepime  IVPB 2000 milliGRAM(s) IV Intermittent every 8 hours  chlorhexidine 4% Liquid 1 Application(s) Topical <User Schedule>  lactated ringers. 1000 milliLiter(s) IV Continuous <Continuous>  lidocaine   Patch 1 Patch Transdermal daily  morphine  - Injectable 2 milliGRAM(s) IV Push every 4 hours PRN  morphine  - Injectable 1 milliGRAM(s) IV Push every 6 hours PRN  pantoprazole Infusion 8 mG/Hr IV Continuous <Continuous>  vancomycin  IVPB      vancomycin  IVPB 1000 milliGRAM(s) IV Intermittent every 12 hours    Drug Dosing Weight  Height (cm): 157.48 (06 Apr 2018 00:00)  Weight (kg): 65.8 (06 Apr 2018 00:00)  BMI (kg/m2): 26.5 (06 Apr 2018 00:00)  BSA (m2): 1.67 (06 Apr 2018 00:00)    CENTRAL LINE: [ ] YES [x ] NO  LOCATION:   DATE INSERTED:  REMOVE: [ ] YES [ ] NO  EXPLAIN:    MCCLENDON: [ ] YES [x ] NO    DATE INSERTED:  REMOVE:  [ ] YES [ ] NO  EXPLAIN:    A-LINE:  [ ] YES [ x] NO  LOCATION:   DATE INSERTED:  REMOVE:  [ ] YES [ ] NO  EXPLAIN:    PMH -reviewed admission note, no change since admission  ______]     ICU Vital Signs Last 24 Hrs  T(C): 37.4 (06 Apr 2018 23:22), Max: 38.7 (06 Apr 2018 18:00)  T(F): 99.3 (06 Apr 2018 23:22), Max: 101.6 (06 Apr 2018 18:00)  HR: 103 (07 Apr 2018 02:00) (74 - 103)  BP: 126/75 (07 Apr 2018 02:00) (80/51 - 134/78)  BP(mean): 88 (07 Apr 2018 02:00) (57 - 91)  ABP: --  ABP(mean): --  RR: 22 (07 Apr 2018 02:00) (19 - 25)  SpO2: 96% (07 Apr 2018 02:00) (94% - 100%)            04-05 @ 07:01  -  04-06 @ 07:00  --------------------------------------------------------  IN: 480 mL / OUT: 850 mL / NET: -370 mL            PHYSICAL EXAM:    GENERAL:   HEAD: atraumatic, normocephalic   EYES: PERRLA, white sclera.   ENMT: nasal mucosa- Oral cavity-   NECK: supple, JVD/ no JVD, thyroid-   LYMPH: no palpable lymph nodes     SKIN: warm, dry   CHEST/LUNG: No Chest deformity , no chest tenderness. bilateral breath sounds,no  adventitious sounds  HEART: RRR, no m/r/g   ABDOMEN: soft, nontender, nondistended; bowel sounds.  :mcclendon catheter.  EXTREMITIES: +1 non-pitting edema, no cyanosis, no clubbing.  NEURO: AA0X3 , , no focal neuro deficits        LABS:  CBC Full  -  ( 06 Apr 2018 18:16 )  WBC Count : 16.9 K/uL  Hemoglobin : 9.5 g/dL  Hematocrit : 28.3 %  Platelet Count - Automated : 300 K/uL  Mean Cell Volume : 87.6 fl  Mean Cell Hemoglobin : 29.5 pg  Mean Cell Hemoglobin Concentration : 33.7 gm/dL  Auto Neutrophil # : x  Auto Lymphocyte # : x  Auto Monocyte # : x  Auto Eosinophil # : x  Auto Basophil # : x  Auto Neutrophil % : x  Auto Lymphocyte % : x  Auto Monocyte % : x  Auto Eosinophil % : x  Auto Basophil % : x    04-06    135  |  105  |  18  ----------------------------<  131<H>  4.1   |  21<L>  |  0.58    Ca    6.8<L>      06 Apr 2018 18:16  Phos  4.1     04-05  Mg     1.7     04-05    TPro  5.3<L>  /  Alb  x   /  TBili  x   /  DBili  x   /  AST  x   /  ALT  x   /  AlkPhos  x   04-05    PT/INR - ( 06 Apr 2018 13:50 )   PT: 15.2 sec;   INR: 1.39 ratio             Culture Results:   No growth to date. (04-04 @ 23:42)  Culture Results:   No growth to date. (04-04 @ 23:42)  Culture Results:   10,000 - 49,000 CFU/mL Enterococcus faecalis (04-04 @ 22:40)      RADIOLOGY & ADDITIONAL STUDIES REVIEWED:  ***    [ ]GOALS OF CARE DISCUSSION WITH PATIENT/FAMILY/PROXY:    CRITICAL CARE TIME SPENT: 35 minutes

## 2018-04-07 NOTE — PROGRESS NOTE ADULT - ATTENDING COMMENTS
- no evidence of GI bleed and H/H is stable for now  -continue protonix  -f/u bx of stomach  -antibx for possible spinal osteo  -accepted to [facility] ICU waiting for a bed  -if H/H is stable, will transfer to medicine
-pat admitted for progressive leg weakness,dx with spinal epidural abscess and possible spinal osteo.  -neuro surgery.. pat will need extensive spinal surgery, accepted by Dr Ny at [facility].. awaiting for an icu bed  -she developed GI bleed s/p multiple PRBC transfusion  -s/p EGD today.. non bleeding ulcer.. large crater with clots possible due to malignancy  -pat will need a repeat EGD  -continue antibx  -may need DERRICK
Patient seen/evaluated at bedside 4/5/2018. I agree with the resident progress note/outlined plan of care. My independent findings and conclusions are documented.     [id number]    Patient reports ongoing back pain, though slightly improved from yesterday. No reported melena, hematochezia on initial assessment in the ED. However, patient now reports she did have black stool last week. She has been on NSAIDS for her back pain. Of note, she had an injection to her back about 1 week ago for back pain. Patient was sent for MRI thoracic-lumbar spine this evening after receiving one unit of prbc with the 2nd unit of blood pending.. Upon her return, patient had a large episode of coffee ground emesis with hypotension. Patient c/o lightheadedness which improved.  SBPs was initially 88 which improved to 95 w/ blood and fluid infusion. MRI returned with large epidural abscess extending from L2-L4 with moderate to severe spinal canal stenosis. ICU and neurosurgery (Dr. Pride, via transfer center)  were consulted. After review with neurosurgery for potential transfer post stabilization, they deferred transferred recommending IR guided drainage.     AAOx3 NaD  S1S2 RRR  CTAb/l no accessory muscle use  soft, NT, ND, +BS  tenderness to palpation of low mid-line back  no saddle anesthesia, sensation intact at all 4 limbs/body/face  4+ bilateral lower extremity strength  patient not appropriately able to relax for lower extremity reflex testing    labs reviewed    MRI T-L spine: Vertebrae:  IMPRESSION:       1.  Large left posterior epidural abscess collection is seen in mid   lumbar spine measuring up to 12 mm in AP dimension and 9.4 cm in craniocaudal   dimension mostly centered at L2 and L3 vertebral levels extending to mid   L4 vertebral level. There is moderate to severe canal stenosis secondary to   mass effect from this lesion.  2.  Focus of increased T2 signal seen at L4-5 disc space. Mild increase   edema seen at L4 and L5 vertebral bodies. Anterior epidural enhancing   collection is seen measuring up to 5 mm  Findings are consistent with discitis and   osteophytosis with likely small anterior epidural early abscess   collection.  3.  Mild edema seen within the paraspinal musculature on the left side at   L3   and L4 levels with small amounts of fluid in L3- L4 facet joint.    Cellulitis/myositis with facet septic arthritis should be considered.      1. acute upper gi hemorrhage with hypotension  2. epidural abscess (L2-L4, early abscess L4-L5), disciitis,  3. progressive low back pain  4. acute blood loss anemia+anemia of chronic disease  5.  syncope  6. hyponatremia  7. dehydration  8. DM T II w/out long term insulin  9. hypoalbuminemia    -transfer to ICU for further management, continue with vancomycin  -Dr. Bowers updated on MRI findings as well as neurosurgery recommendations  -for IR drainage once hemodynamic stability obtained--> call service in am to review imaging  -blood cultures  -check iron profile  -CT chest/abd/pelvis once stable  -IVf hydration, check urine sodium/urine creatinine  -discordent albumin/serum protein values--> check spep/upep  -serum/urine immunofixation  -flow cytometry was ordered and pending  -infectious disease, hematology consults  -stool guiaiac  -telemetry, TTE  -ivf hydration, orthostatics .

## 2018-04-07 NOTE — PROGRESS NOTE ADULT - ASSESSMENT
1.	Lumbar epidural abscess with discitis - needs drainage  2.	Lumbar cellulitis/ myositis  3.	Leukocytosis  4.	?Malignancy/ hemorrhagic gastric ulcer  ·	cont vanco 1.25 gms IV q12h  ·	cont maxipime 2gms iv q8hrs   ·	awaiting BC final results  ·	awaiting transfer to tertiary facility   ·	time spent 30 mins

## 2018-04-08 ENCOUNTER — INPATIENT (INPATIENT)
Facility: HOSPITAL | Age: 68
LOS: 13 days | End: 2018-04-22
Attending: HOSPITALIST | Admitting: HOSPITALIST
Payer: MEDICARE

## 2018-04-08 VITALS
RESPIRATION RATE: 22 BRPM | TEMPERATURE: 100 F | WEIGHT: 139.55 LBS | OXYGEN SATURATION: 98 % | DIASTOLIC BLOOD PRESSURE: 99 MMHG | HEART RATE: 94 BPM | SYSTOLIC BLOOD PRESSURE: 120 MMHG

## 2018-04-08 VITALS
SYSTOLIC BLOOD PRESSURE: 153 MMHG | DIASTOLIC BLOOD PRESSURE: 84 MMHG | OXYGEN SATURATION: 98 % | RESPIRATION RATE: 15 BRPM | HEART RATE: 98 BPM

## 2018-04-08 DIAGNOSIS — N63.0 UNSPECIFIED LUMP IN UNSPECIFIED BREAST: ICD-10-CM

## 2018-04-08 DIAGNOSIS — E11.9 TYPE 2 DIABETES MELLITUS WITHOUT COMPLICATIONS: ICD-10-CM

## 2018-04-08 DIAGNOSIS — S06.4X0A EPIDURAL HEMORRHAGE WITHOUT LOSS OF CONSCIOUSNESS, INITIAL ENCOUNTER: ICD-10-CM

## 2018-04-08 DIAGNOSIS — E87.1 HYPO-OSMOLALITY AND HYPONATREMIA: ICD-10-CM

## 2018-04-08 DIAGNOSIS — N39.0 URINARY TRACT INFECTION, SITE NOT SPECIFIED: ICD-10-CM

## 2018-04-08 DIAGNOSIS — I10 ESSENTIAL (PRIMARY) HYPERTENSION: ICD-10-CM

## 2018-04-08 DIAGNOSIS — G06.2 EXTRADURAL AND SUBDURAL ABSCESS, UNSPECIFIED: ICD-10-CM

## 2018-04-08 DIAGNOSIS — D50.0 IRON DEFICIENCY ANEMIA SECONDARY TO BLOOD LOSS (CHRONIC): ICD-10-CM

## 2018-04-08 DIAGNOSIS — Z98.51 TUBAL LIGATION STATUS: Chronic | ICD-10-CM

## 2018-04-08 DIAGNOSIS — I82.409 ACUTE EMBOLISM AND THROMBOSIS OF UNSPECIFIED DEEP VEINS OF UNSPECIFIED LOWER EXTREMITY: ICD-10-CM

## 2018-04-08 DIAGNOSIS — K25.0 ACUTE GASTRIC ULCER WITH HEMORRHAGE: ICD-10-CM

## 2018-04-08 LAB
ALBUMIN SERPL ELPH-MCNC: 2.2 G/DL — LOW (ref 3.3–5)
ALP SERPL-CCNC: 98 U/L — SIGNIFICANT CHANGE UP (ref 40–120)
ALT FLD-CCNC: 23 U/L — SIGNIFICANT CHANGE UP (ref 4–33)
APPEARANCE UR: CLEAR — SIGNIFICANT CHANGE UP
APTT BLD: 27.1 SEC — LOW (ref 27.5–37.4)
AST SERPL-CCNC: 19 U/L — SIGNIFICANT CHANGE UP (ref 4–32)
BACTERIA # UR AUTO: SIGNIFICANT CHANGE UP
BASOPHILS # BLD AUTO: 0.05 K/UL — SIGNIFICANT CHANGE UP (ref 0–0.2)
BASOPHILS NFR BLD AUTO: 0.3 % — SIGNIFICANT CHANGE UP (ref 0–2)
BILIRUB SERPL-MCNC: 0.8 MG/DL — SIGNIFICANT CHANGE UP (ref 0.2–1.2)
BILIRUB UR-MCNC: NEGATIVE — SIGNIFICANT CHANGE UP
BLOOD UR QL VISUAL: NEGATIVE — SIGNIFICANT CHANGE UP
BUN SERPL-MCNC: 8 MG/DL — SIGNIFICANT CHANGE UP (ref 7–23)
BUN SERPL-MCNC: 8 MG/DL — SIGNIFICANT CHANGE UP (ref 7–23)
CALCIUM SERPL-MCNC: 7.6 MG/DL — LOW (ref 8.4–10.5)
CALCIUM SERPL-MCNC: 7.7 MG/DL — LOW (ref 8.4–10.5)
CHLORIDE SERPL-SCNC: 88 MMOL/L — LOW (ref 98–107)
CHLORIDE SERPL-SCNC: 91 MMOL/L — LOW (ref 98–107)
CO2 SERPL-SCNC: 21 MMOL/L — LOW (ref 22–31)
CO2 SERPL-SCNC: 22 MMOL/L — SIGNIFICANT CHANGE UP (ref 22–31)
COLOR SPEC: SIGNIFICANT CHANGE UP
CREAT SERPL-MCNC: 0.67 MG/DL — SIGNIFICANT CHANGE UP (ref 0.5–1.3)
CREAT SERPL-MCNC: 0.69 MG/DL — SIGNIFICANT CHANGE UP (ref 0.5–1.3)
CRP SERPL-MCNC: 142.1 MG/L — HIGH
EOSINOPHIL # BLD AUTO: 0.04 K/UL — SIGNIFICANT CHANGE UP (ref 0–0.5)
EOSINOPHIL NFR BLD AUTO: 0.2 % — SIGNIFICANT CHANGE UP (ref 0–6)
ERYTHROCYTE [SEDIMENTATION RATE] IN BLOOD: 83 MM/HR — HIGH (ref 4–25)
GLUCOSE SERPL-MCNC: 145 MG/DL — HIGH (ref 70–99)
GLUCOSE SERPL-MCNC: 151 MG/DL — HIGH (ref 70–99)
GLUCOSE UR-MCNC: NEGATIVE — SIGNIFICANT CHANGE UP
HCT VFR BLD CALC: 28.8 % — LOW (ref 34.5–45)
HGB BLD-MCNC: 9.8 G/DL — LOW (ref 11.5–15.5)
IMM GRANULOCYTES # BLD AUTO: 0.37 # — SIGNIFICANT CHANGE UP
IMM GRANULOCYTES NFR BLD AUTO: 2.3 % — HIGH (ref 0–1.5)
INR BLD: 1.15 — SIGNIFICANT CHANGE UP (ref 0.88–1.17)
KETONES UR-MCNC: SIGNIFICANT CHANGE UP
LEUKOCYTE ESTERASE UR-ACNC: HIGH
LYMPHOCYTES # BLD AUTO: 1.88 K/UL — SIGNIFICANT CHANGE UP (ref 1–3.3)
LYMPHOCYTES # BLD AUTO: 11.7 % — LOW (ref 13–44)
MCHC RBC-ENTMCNC: 29.5 PG — SIGNIFICANT CHANGE UP (ref 27–34)
MCHC RBC-ENTMCNC: 34 % — SIGNIFICANT CHANGE UP (ref 32–36)
MCV RBC AUTO: 86.7 FL — SIGNIFICANT CHANGE UP (ref 80–100)
MONOCYTES # BLD AUTO: 1.03 K/UL — HIGH (ref 0–0.9)
MONOCYTES NFR BLD AUTO: 6.4 % — SIGNIFICANT CHANGE UP (ref 2–14)
MUCOUS THREADS # UR AUTO: SIGNIFICANT CHANGE UP
NEUTROPHILS # BLD AUTO: 12.64 K/UL — HIGH (ref 1.8–7.4)
NEUTROPHILS NFR BLD AUTO: 79.1 % — HIGH (ref 43–77)
NITRITE UR-MCNC: NEGATIVE — SIGNIFICANT CHANGE UP
NON-SQ EPI CELLS # UR AUTO: 1 — SIGNIFICANT CHANGE UP
NRBC # FLD: 0.02 — SIGNIFICANT CHANGE UP
PH UR: 7 — SIGNIFICANT CHANGE UP (ref 4.6–8)
PLATELET # BLD AUTO: 368 K/UL — SIGNIFICANT CHANGE UP (ref 150–400)
PMV BLD: 9.4 FL — SIGNIFICANT CHANGE UP (ref 7–13)
POTASSIUM SERPL-MCNC: 3.8 MMOL/L — SIGNIFICANT CHANGE UP (ref 3.5–5.3)
POTASSIUM SERPL-MCNC: 3.9 MMOL/L — SIGNIFICANT CHANGE UP (ref 3.5–5.3)
POTASSIUM SERPL-SCNC: 3.8 MMOL/L — SIGNIFICANT CHANGE UP (ref 3.5–5.3)
POTASSIUM SERPL-SCNC: 3.9 MMOL/L — SIGNIFICANT CHANGE UP (ref 3.5–5.3)
PROT SERPL-MCNC: 6.3 G/DL — SIGNIFICANT CHANGE UP (ref 6–8.3)
PROT UR-MCNC: NEGATIVE MG/DL — SIGNIFICANT CHANGE UP
PROTHROM AB SERPL-ACNC: 13.3 SEC — HIGH (ref 9.8–13.1)
RBC # BLD: 3.32 M/UL — LOW (ref 3.8–5.2)
RBC # FLD: 15.2 % — HIGH (ref 10.3–14.5)
RBC CASTS # UR COMP ASSIST: SIGNIFICANT CHANGE UP (ref 0–?)
SODIUM SERPL-SCNC: 124 MMOL/L — LOW (ref 135–145)
SODIUM SERPL-SCNC: 125 MMOL/L — LOW (ref 135–145)
SP GR SPEC: 1.01 — SIGNIFICANT CHANGE UP (ref 1–1.04)
SQUAMOUS # UR AUTO: SIGNIFICANT CHANGE UP
UROBILINOGEN FLD QL: NORMAL MG/DL — SIGNIFICANT CHANGE UP
WBC # BLD: 16.01 K/UL — HIGH (ref 3.8–10.5)
WBC # FLD AUTO: 16.01 K/UL — HIGH (ref 3.8–10.5)
WBC UR QL: HIGH (ref 0–?)

## 2018-04-08 PROCEDURE — 82607 VITAMIN B-12: CPT

## 2018-04-08 PROCEDURE — P9059: CPT

## 2018-04-08 PROCEDURE — 82962 GLUCOSE BLOOD TEST: CPT

## 2018-04-08 PROCEDURE — 84155 ASSAY OF PROTEIN SERUM: CPT

## 2018-04-08 PROCEDURE — 85610 PROTHROMBIN TIME: CPT

## 2018-04-08 PROCEDURE — 81001 URINALYSIS AUTO W/SCOPE: CPT

## 2018-04-08 PROCEDURE — 93306 TTE W/DOPPLER COMPLETE: CPT

## 2018-04-08 PROCEDURE — 99253 IP/OBS CNSLTJ NEW/EST LOW 45: CPT

## 2018-04-08 PROCEDURE — 80048 BASIC METABOLIC PNL TOTAL CA: CPT

## 2018-04-08 PROCEDURE — 83690 ASSAY OF LIPASE: CPT

## 2018-04-08 PROCEDURE — 84165 PROTEIN E-PHORESIS SERUM: CPT

## 2018-04-08 PROCEDURE — 86850 RBC ANTIBODY SCREEN: CPT

## 2018-04-08 PROCEDURE — 83550 IRON BINDING TEST: CPT

## 2018-04-08 PROCEDURE — 83935 ASSAY OF URINE OSMOLALITY: CPT

## 2018-04-08 PROCEDURE — 83930 ASSAY OF BLOOD OSMOLALITY: CPT

## 2018-04-08 PROCEDURE — 88312 SPECIAL STAINS GROUP 1: CPT

## 2018-04-08 PROCEDURE — 83010 ASSAY OF HAPTOGLOBIN QUANT: CPT

## 2018-04-08 PROCEDURE — 36430 TRANSFUSION BLD/BLD COMPNT: CPT

## 2018-04-08 PROCEDURE — 74177 CT ABD & PELVIS W/CONTRAST: CPT | Mod: 26

## 2018-04-08 PROCEDURE — 99223 1ST HOSP IP/OBS HIGH 75: CPT

## 2018-04-08 PROCEDURE — 87150 DNA/RNA AMPLIFIED PROBE: CPT

## 2018-04-08 PROCEDURE — 85045 AUTOMATED RETICULOCYTE COUNT: CPT

## 2018-04-08 PROCEDURE — 72147 MRI CHEST SPINE W/DYE: CPT

## 2018-04-08 PROCEDURE — 72149 MRI LUMBAR SPINE W/DYE: CPT

## 2018-04-08 PROCEDURE — 93005 ELECTROCARDIOGRAM TRACING: CPT

## 2018-04-08 PROCEDURE — 84145 PROCALCITONIN (PCT): CPT

## 2018-04-08 PROCEDURE — 80061 LIPID PANEL: CPT

## 2018-04-08 PROCEDURE — 99255 IP/OBS CONSLTJ NEW/EST HI 80: CPT | Mod: GC

## 2018-04-08 PROCEDURE — 86140 C-REACTIVE PROTEIN: CPT

## 2018-04-08 PROCEDURE — 99221 1ST HOSP IP/OBS SF/LOW 40: CPT

## 2018-04-08 PROCEDURE — 87086 URINE CULTURE/COLONY COUNT: CPT

## 2018-04-08 PROCEDURE — 82746 ASSAY OF FOLIC ACID SERUM: CPT

## 2018-04-08 PROCEDURE — 88184 FLOWCYTOMETRY/ TC 1 MARKER: CPT

## 2018-04-08 PROCEDURE — 85652 RBC SED RATE AUTOMATED: CPT

## 2018-04-08 PROCEDURE — 82728 ASSAY OF FERRITIN: CPT

## 2018-04-08 PROCEDURE — 84550 ASSAY OF BLOOD/URIC ACID: CPT

## 2018-04-08 PROCEDURE — 84300 ASSAY OF URINE SODIUM: CPT

## 2018-04-08 PROCEDURE — 87186 SC STD MICRODIL/AGAR DIL: CPT

## 2018-04-08 PROCEDURE — 71045 X-RAY EXAM CHEST 1 VIEW: CPT

## 2018-04-08 PROCEDURE — 83036 HEMOGLOBIN GLYCOSYLATED A1C: CPT

## 2018-04-08 PROCEDURE — 86334 IMMUNOFIX E-PHORESIS SERUM: CPT

## 2018-04-08 PROCEDURE — 80202 ASSAY OF VANCOMYCIN: CPT

## 2018-04-08 PROCEDURE — 86900 BLOOD TYPING SEROLOGIC ABO: CPT

## 2018-04-08 PROCEDURE — 86923 COMPATIBILITY TEST ELECTRIC: CPT

## 2018-04-08 PROCEDURE — 88185 FLOWCYTOMETRY/TC ADD-ON: CPT

## 2018-04-08 PROCEDURE — 84443 ASSAY THYROID STIM HORMONE: CPT

## 2018-04-08 PROCEDURE — 83735 ASSAY OF MAGNESIUM: CPT

## 2018-04-08 PROCEDURE — 86901 BLOOD TYPING SEROLOGIC RH(D): CPT

## 2018-04-08 PROCEDURE — 99285 EMERGENCY DEPT VISIT HI MDM: CPT | Mod: 25

## 2018-04-08 PROCEDURE — 88305 TISSUE EXAM BY PATHOLOGIST: CPT

## 2018-04-08 PROCEDURE — 87040 BLOOD CULTURE FOR BACTERIA: CPT

## 2018-04-08 PROCEDURE — 84100 ASSAY OF PHOSPHORUS: CPT

## 2018-04-08 PROCEDURE — 85027 COMPLETE CBC AUTOMATED: CPT

## 2018-04-08 PROCEDURE — 80053 COMPREHEN METABOLIC PANEL: CPT

## 2018-04-08 PROCEDURE — P9040: CPT

## 2018-04-08 PROCEDURE — 83605 ASSAY OF LACTIC ACID: CPT

## 2018-04-08 RX ORDER — SODIUM CHLORIDE 9 MG/ML
2 INJECTION INTRAMUSCULAR; INTRAVENOUS; SUBCUTANEOUS THREE TIMES A DAY
Qty: 0 | Refills: 0 | Status: DISCONTINUED | OUTPATIENT
Start: 2018-04-08 | End: 2018-04-10

## 2018-04-08 RX ORDER — PANTOPRAZOLE SODIUM 20 MG/1
40 TABLET, DELAYED RELEASE ORAL
Qty: 0 | Refills: 0 | Status: DISCONTINUED | OUTPATIENT
Start: 2018-04-08 | End: 2018-04-11

## 2018-04-08 RX ORDER — DEXTROSE 50 % IN WATER 50 %
25 SYRINGE (ML) INTRAVENOUS ONCE
Qty: 0 | Refills: 0 | Status: DISCONTINUED | OUTPATIENT
Start: 2018-04-08 | End: 2018-04-22

## 2018-04-08 RX ORDER — OXYCODONE HYDROCHLORIDE 5 MG/1
5 TABLET ORAL EVERY 4 HOURS
Qty: 0 | Refills: 0 | Status: DISCONTINUED | OUTPATIENT
Start: 2018-04-08 | End: 2018-04-12

## 2018-04-08 RX ORDER — MORPHINE SULFATE 50 MG/1
2 CAPSULE, EXTENDED RELEASE ORAL EVERY 4 HOURS
Qty: 0 | Refills: 0 | Status: DISCONTINUED | OUTPATIENT
Start: 2018-04-08 | End: 2018-04-12

## 2018-04-08 RX ORDER — DEXTROSE MONOHYDRATE, SODIUM CHLORIDE, AND POTASSIUM CHLORIDE 50; .745; 4.5 G/1000ML; G/1000ML; G/1000ML
1000 INJECTION, SOLUTION INTRAVENOUS
Qty: 0 | Refills: 0 | Status: DISCONTINUED | OUTPATIENT
Start: 2018-04-08 | End: 2018-04-08

## 2018-04-08 RX ORDER — INSULIN LISPRO 100/ML
VIAL (ML) SUBCUTANEOUS
Qty: 0 | Refills: 0 | Status: DISCONTINUED | OUTPATIENT
Start: 2018-04-08 | End: 2018-04-10

## 2018-04-08 RX ORDER — INSULIN LISPRO 100/ML
VIAL (ML) SUBCUTANEOUS EVERY 6 HOURS
Qty: 0 | Refills: 0 | Status: DISCONTINUED | OUTPATIENT
Start: 2018-04-08 | End: 2018-04-08

## 2018-04-08 RX ORDER — CEFEPIME 1 G/1
2000 INJECTION, POWDER, FOR SOLUTION INTRAMUSCULAR; INTRAVENOUS EVERY 8 HOURS
Qty: 0 | Refills: 0 | Status: DISCONTINUED | OUTPATIENT
Start: 2018-04-08 | End: 2018-04-10

## 2018-04-08 RX ORDER — ACETAMINOPHEN 500 MG
650 TABLET ORAL EVERY 6 HOURS
Qty: 0 | Refills: 0 | Status: DISCONTINUED | OUTPATIENT
Start: 2018-04-08 | End: 2018-04-22

## 2018-04-08 RX ORDER — SENNA PLUS 8.6 MG/1
2 TABLET ORAL AT BEDTIME
Qty: 0 | Refills: 0 | Status: DISCONTINUED | OUTPATIENT
Start: 2018-04-08 | End: 2018-04-13

## 2018-04-08 RX ORDER — INSULIN LISPRO 100/ML
VIAL (ML) SUBCUTANEOUS AT BEDTIME
Qty: 0 | Refills: 0 | Status: DISCONTINUED | OUTPATIENT
Start: 2018-04-08 | End: 2018-04-22

## 2018-04-08 RX ORDER — SODIUM CHLORIDE 9 MG/ML
1000 INJECTION, SOLUTION INTRAVENOUS
Qty: 0 | Refills: 0 | Status: DISCONTINUED | OUTPATIENT
Start: 2018-04-08 | End: 2018-04-22

## 2018-04-08 RX ORDER — DEXTROSE 50 % IN WATER 50 %
1 SYRINGE (ML) INTRAVENOUS ONCE
Qty: 0 | Refills: 0 | Status: DISCONTINUED | OUTPATIENT
Start: 2018-04-08 | End: 2018-04-22

## 2018-04-08 RX ORDER — ATORVASTATIN CALCIUM 80 MG/1
40 TABLET, FILM COATED ORAL AT BEDTIME
Qty: 0 | Refills: 0 | Status: DISCONTINUED | OUTPATIENT
Start: 2018-04-08 | End: 2018-04-22

## 2018-04-08 RX ORDER — VANCOMYCIN HCL 1 G
1000 VIAL (EA) INTRAVENOUS EVERY 12 HOURS
Qty: 0 | Refills: 0 | Status: DISCONTINUED | OUTPATIENT
Start: 2018-04-08 | End: 2018-04-08

## 2018-04-08 RX ORDER — SUCRALFATE 1 G
1 TABLET ORAL
Qty: 0 | Refills: 0 | Status: DISCONTINUED | OUTPATIENT
Start: 2018-04-08 | End: 2018-04-21

## 2018-04-08 RX ORDER — GLUCAGON INJECTION, SOLUTION 0.5 MG/.1ML
1 INJECTION, SOLUTION SUBCUTANEOUS ONCE
Qty: 0 | Refills: 0 | Status: DISCONTINUED | OUTPATIENT
Start: 2018-04-08 | End: 2018-04-22

## 2018-04-08 RX ORDER — VANCOMYCIN HCL 1 G
1250 VIAL (EA) INTRAVENOUS EVERY 12 HOURS
Qty: 0 | Refills: 0 | Status: DISCONTINUED | OUTPATIENT
Start: 2018-04-08 | End: 2018-04-13

## 2018-04-08 RX ORDER — LIDOCAINE 4 G/100G
1 CREAM TOPICAL DAILY
Qty: 0 | Refills: 0 | Status: DISCONTINUED | OUTPATIENT
Start: 2018-04-08 | End: 2018-04-22

## 2018-04-08 RX ORDER — DEXTROSE 50 % IN WATER 50 %
12.5 SYRINGE (ML) INTRAVENOUS ONCE
Qty: 0 | Refills: 0 | Status: DISCONTINUED | OUTPATIENT
Start: 2018-04-08 | End: 2018-04-22

## 2018-04-08 RX ORDER — DOCUSATE SODIUM 100 MG
100 CAPSULE ORAL THREE TIMES A DAY
Qty: 0 | Refills: 0 | Status: DISCONTINUED | OUTPATIENT
Start: 2018-04-08 | End: 2018-04-13

## 2018-04-08 RX ADMIN — PANTOPRAZOLE SODIUM 40 MILLIGRAM(S): 20 TABLET, DELAYED RELEASE ORAL at 18:51

## 2018-04-08 RX ADMIN — Medication 100 MILLIGRAM(S): at 05:27

## 2018-04-08 RX ADMIN — Medication 1 GRAM(S): at 18:51

## 2018-04-08 RX ADMIN — Medication 1 GRAM(S): at 23:25

## 2018-04-08 RX ADMIN — Medication 166.67 MILLIGRAM(S): at 06:25

## 2018-04-08 RX ADMIN — PANTOPRAZOLE SODIUM 40 MILLIGRAM(S): 20 TABLET, DELAYED RELEASE ORAL at 05:27

## 2018-04-08 RX ADMIN — CEFEPIME 100 MILLIGRAM(S): 1 INJECTION, POWDER, FOR SOLUTION INTRAMUSCULAR; INTRAVENOUS at 22:28

## 2018-04-08 RX ADMIN — CEFEPIME 100 MILLIGRAM(S): 1 INJECTION, POWDER, FOR SOLUTION INTRAMUSCULAR; INTRAVENOUS at 05:28

## 2018-04-08 RX ADMIN — ATORVASTATIN CALCIUM 40 MILLIGRAM(S): 80 TABLET, FILM COATED ORAL at 22:27

## 2018-04-08 RX ADMIN — LIDOCAINE 1 PATCH: 4 CREAM TOPICAL at 13:12

## 2018-04-08 RX ADMIN — Medication 1 GRAM(S): at 13:13

## 2018-04-08 RX ADMIN — SODIUM CHLORIDE 2 GRAM(S): 9 INJECTION INTRAMUSCULAR; INTRAVENOUS; SUBCUTANEOUS at 06:41

## 2018-04-08 RX ADMIN — SODIUM CHLORIDE 2 GRAM(S): 9 INJECTION INTRAMUSCULAR; INTRAVENOUS; SUBCUTANEOUS at 13:14

## 2018-04-08 RX ADMIN — SODIUM CHLORIDE 2 GRAM(S): 9 INJECTION INTRAMUSCULAR; INTRAVENOUS; SUBCUTANEOUS at 22:28

## 2018-04-08 RX ADMIN — Medication 1 GRAM(S): at 05:27

## 2018-04-08 RX ADMIN — DEXTROSE MONOHYDRATE, SODIUM CHLORIDE, AND POTASSIUM CHLORIDE 75 MILLILITER(S): 50; .745; 4.5 INJECTION, SOLUTION INTRAVENOUS at 05:33

## 2018-04-08 RX ADMIN — Medication 166.67 MILLIGRAM(S): at 20:00

## 2018-04-08 RX ADMIN — Medication 100 MILLIGRAM(S): at 13:14

## 2018-04-08 RX ADMIN — SENNA PLUS 2 TABLET(S): 8.6 TABLET ORAL at 22:27

## 2018-04-08 RX ADMIN — CEFEPIME 100 MILLIGRAM(S): 1 INJECTION, POWDER, FOR SOLUTION INTRAMUSCULAR; INTRAVENOUS at 13:13

## 2018-04-08 RX ADMIN — Medication 100 MILLIGRAM(S): at 22:27

## 2018-04-08 NOTE — CONSULT NOTE ADULT - PROBLEM SELECTOR RECOMMENDATION 9
-cont empiric antibiotics as per ID vanco/cefepime  -possible IR vs surgical intervention  -vanco trough prior to 4th dose  -f/u cx

## 2018-04-08 NOTE — CONSULT NOTE ADULT - PROBLEM SELECTOR RECOMMENDATION 5
-initially thought to be secondary to HCTZ improved off HCTZ pt reports decreased PO intake past few days   -NS

## 2018-04-08 NOTE — CONSULT NOTE ADULT - ASSESSMENT
69yo F admitted for posterior epidural/psoas abscess c/b upper GI bleed s/p EGD showing large antral ulcer s/p biopsy, concern for malignancy. Incidental finding of infrarenal and b/l iliac DVTs on CT. Pt complaining of only intermittent lateral bilateral upper leg pain, no skin changes, no edema.  - In the setting of a recent UGI bleed, would hold on A/C  - Will evaluate for IVC filter, possibly to be placed from superior access (e.g. internal jugular)  - Will discuss with vascular fellow on call    C team  [id number] 69yo F admitted for posterior epidural/psoas abscess c/b upper GI bleed s/p EGD showing large antral ulcer s/p biopsy, concern for malignancy. Incidental finding of infrarenal and b/l iliac DVTs on CT. Pt complaining of only intermittent lateral bilateral upper leg pain, no skin changes, no edema.  - In the setting of a recent UGI bleed, would hold on A/C  - Will need IR evaluation for placement of suprarenal IVC filter from superior access (e.g. internal jugular)  - d/w vascular fellow on call    C team  [id number]

## 2018-04-08 NOTE — H&P ADULT - NSHPLABSRESULTS_GEN_ALL_CORE
9.6    16.2  )-----------( 339      ( 07 Apr 2018 17:06 )             28.9   04-07    130<L>  |  99  |  13  ----------------------------<  120<H>  3.9   |  22  |  0.58    Ca    7.7<L>      07 Apr 2018 03:55    PT/INR - ( 06 Apr 2018 13:50 )   PT: 15.2 sec;   INR: 1.39 ratio         Culture - Urine (04.04.18 @ 22:40)    -  Ampicillin: S 4    -  Ciprofloxacin: S <=1    -  Nitrofurantoin: S <=32    -  Tetra/Doxy: R >8    -  Vancomycin: S 2    Specimen Source: .Urine Clean Catch (Midstream)    Culture Results:   10,000 - 49,000 CFU/mL Enterococcus faecalis    Organism Identification: Enterococcus faecalis    Organism: Enterococcus faecalis    Method Type: KYLE    < from: MR Lumbar Spine w/ IV Cont (04.05.18 @ 20:12) >    Large epidural fluid collection suggestive of an abscess in the lumbar   spine from lower L1 through mid L4 levels, with mass effect on the thecal   sac as described. Adjacent left paravertebral soft tissue thickening and   inflammatory signal.    Findings suggests discitis and osteomyelitis of L4-L5. Possible small   anterior epidural abscess at L4-L5 disc level.    Moderate right facet joint effusion at L4-L5 with extra-articular   extension the paravertebral soft tissues demonstrates peripheral   enhancement; an infection/abscess of the right L4-L5 facet joint is not   excluded.    < end of copied text >

## 2018-04-08 NOTE — CONSULT NOTE ADULT - ASSESSMENT
67 YO female was admitted to [facility] 4/5/18 with 2 weeks of back pain and fevers found to have epidural abscess now on IV abx, hospital course cb coffee ground emesis with Hb of 5.9 requiring ICU admission and 4 u PRBC and 2 units platelets s/p EGD showing gastric mass. Now transferred to [facility] for management of epidural abscess by neurosurgery (no plan for intervention) found to have bilateral DVT requiring anticoagulation. GI now consulted for management of EGD findings as well as AC recommendations.    1) Hx of coffee ground emesis with EGD showing    -continue PPI IV BID  - daily CBC and transfuse for Hb<7  - f/u pathology report from endoscopic biopsies done from [place]   -NOTE INCOMPLETE 69 YO female was admitted to [facility] 4/5/18 with 2 weeks of back pain and fevers found to have epidural abscess now on IV abx, hospital course cb coffee ground emesis with Hb of 5.9 requiring ICU admission and 4 u PRBC and 2 units platelets s/p EGD showing gastric mass. Now transferred to [facility] for management of epidural abscess by neurosurgery (no plan for intervention) found to have bilateral DVT requiring anticoagulation. GI now consulted for management of EGD findings as well as AC recommendations.    1) Hx of coffee ground emesis with EGD showing gastric ulcer concerning for malignancy  EGD reports gastric ulcer suspicious for malignancy, Report with poor pictures (in black and white) so could not see if ulcer had any stigmata of bleeding. No intervention was reported from the OSH endoscopy report.  Hb and vitals have been stable here, no additional reports of GI bleed.    -continue PPI IV BID  - daily CBC and transfuse for Hb<7  - obtain pathology report from endoscopic biopsies done from [place]   - will need to discuss with house GI attending Dr. Best  - would recommend getting colored pictures of endoscopy report or trying to contact GI attending who did the procedure to see how he feels about appropriate time to initiate anticoagulation based on the findings he saw on the EGD.

## 2018-04-08 NOTE — H&P ADULT - NSHPPHYSICALEXAM_GEN_ALL_CORE
WDWN female in NAD  ICU Vital Signs Last 24 Hrs  T(C): 37.9 (08 Apr 2018 01:24), Max: 38.5 (07 Apr 2018 04:00)  T(F): 100.2 (08 Apr 2018 01:24), Max: 101.3 (07 Apr 2018 04:00)  HR: 94 (08 Apr 2018 03:00) (75 - 99)  BP: 143/99 (08 Apr 2018 03:00) (85/50 - 153/84)  BP(mean): 109 (08 Apr 2018 03:00) (57 - 110)  ABP: --  ABP(mean): --  RR: 23 (08 Apr 2018 03:00) (15 - 33)  SpO2: 98% (08 Apr 2018 03:00) (94% - 100%)    AAO X 3  PERRLA, EOMI  CN 2-12 grossly intact  YOO strength 5/5  SILT

## 2018-04-08 NOTE — CONSULT NOTE ADULT - CONSULT REASON
Gastric ulcer/mass
Hx GI bleed
epidural abscess
infrarenal IVC and b/l iliac DVTs
medical comanagement

## 2018-04-08 NOTE — CONSULT NOTE ADULT - SUBJECTIVE AND OBJECTIVE BOX
HPI:  67 YO female was admitted to [facility] 18 with 2 weeks of back pain and fevers. Patient was unable to ambulate due to pain but had no focal weakness, no sensory loss, no bowel or bladder dysfunction.  MRI of the lumbar spine revealed a left sided posterior epidural abscess extending from L1-L4. Her hospital course was complicated by an upper GI bleed requiring transfusions of 4U PRBC and 1U of platelets likely due to NSAIDs taken for back pain. +EGD as per ICU notes  EGD.. non bleeding ulcer.. large crater with clots possible due to malignancy. Initially treated with vanco/zosyn then changed to vanco/impenenem due to fever . (2018 03:15)      PAST MEDICAL & SURGICAL HISTORY:  Acute gastric ulcer with hemorrhage  Essential hypertension  Type 2 diabetes mellitus without complication  HLD (hyperlipidemia)  H/O tubal ligation      Social History:  does not work denies T/A/D    FAMILY HISTORY:  No pertinent family history in first degree relatives      Allergies    No Known Allergies    Intolerances          MEDICATIONS  (STANDING):  atorvastatin 40 milliGRAM(s) Oral at bedtime  cefepime  IVPB 2000 milliGRAM(s) IV Intermittent every 8 hours  docusate sodium 100 milliGRAM(s) Oral three times a day  insulin lispro (HumaLOG) corrective regimen sliding scale   SubCutaneous every 6 hours  lidocaine   Patch 1 Patch Transdermal daily  pantoprazole  Injectable 40 milliGRAM(s) IV Push two times a day  senna 2 Tablet(s) Oral at bedtime  sodium chloride 2 Gram(s) Oral three times a day  sodium chloride 0.9% with potassium chloride 20 mEq/L 1000 milliLiter(s) (75 mL/Hr) IV Continuous <Continuous>  sucralfate 1 Gram(s) Oral four times a day  vancomycin  IVPB 1250 milliGRAM(s) IV Intermittent every 12 hours    MEDICATIONS  (PRN):  acetaminophen   Tablet 650 milliGRAM(s) Oral every 6 hours PRN For Temp greater than 38 C (100.4 F)  acetaminophen   Tablet. 650 milliGRAM(s) Oral every 6 hours PRN Mild Pain (1 - 3)  morphine  - Injectable 2 milliGRAM(s) IV Push every 4 hours PRN Severe Pain (7 - 10)  oxyCODONE    IR 5 milliGRAM(s) Oral every 4 hours PRN Moderate Pain (4 - 6)      Review of Systems:   CONSTITUTIONAL: No fever, weight loss, or fatigue  EYES: No eye pain, visual disturbances, or discharge  ENMT:  No difficulty hearing, tinnitus, vertigo; No sinus or throat pain  NECK: No pain or stiffness  BREASTS: No pain, masses, or nipple discharge  RESPIRATORY: No cough, wheezing, chills or hemoptysis; No shortness of breath  CARDIOVASCULAR: No chest pain, palpitations, dizziness, or leg swelling  GASTROINTESTINAL: No abdominal or epigastric pain. No nausea, vomiting, or No diarrhea or constipation.  or hematochezia. +melena   GENITOURINARY: No dysuria, frequency, hematuria, or incontinence  NEUROLOGICAL: No headaches, memory loss, numbness, or tremors  SKIN: No itching, burning, rashes, or lesions   LYMPH NODES: No enlarged glands  ENDOCRINE: No heat or cold intolerance; No hair loss  MUSCULOSKELETAL: No joint pain or swelling; +LBP   PSYCHIATRIC: No depression, anxiety, mood swings, or difficulty sleeping  HEME/LYMPH: No easy bruising, or bleeding gums  ALLERY AND IMMUNOLOGIC: No hives or eczema          CAPILLARY BLOOD GLUCOSE      POCT Blood Glucose.: 124 mg/dL (2018 12:58)  POCT Blood Glucose.: 139 mg/dL (2018 05:57)  POCT Blood Glucose.: 138 mg/dL (2018 21:08)  POCT Blood Glucose.: 248 mg/dL (2018 16:56)    I&O's Summary    2018 07:  -  2018 07:00  --------------------------------------------------------  IN: 0 mL / OUT: 1150 mL / NET: -1150 mL    2018 07:  -  2018 15:27  --------------------------------------------------------  IN: 0 mL / OUT: 300 mL / NET: -300 mL      ICU Vital Signs Last 24 Hrs  T(C): 37.4 (2018 14:27), Max: 37.9 (2018 01:24)  T(F): 99.3 (2018 14:27), Max: 100.2 (2018 01:24)  HR: 88 (:) (84 - 99)  BP: 131/81 (2018 14:27) (120/74 - 153/84)  BP(mean): 86 (2018 04:00) (85 - 110)  ABP: --  ABP(mean): --  RR: 18 (2018 14:27) (15 - 33)  SpO2: 98% (:) (94% - 100%)    PHYSICAL EXAM:  GENERAL: NAD, well-developed  HEAD:  Atraumatic, Normocephalic  EYES: EOMI, PERRLA, conjunctiva and sclera clear  NECK: Supple, No JVD  CHEST/LUNG: Clear to auscultation bilaterally; No wheeze  HEART: Regular rate and rhythm; No murmurs, rubs, or gallops  ABDOMEN: Soft, Nontender, Nondistended; Bowel sounds present  EXTREMITIES:  2+ Peripheral Pulses, No clubbing, cyanosis, or edema  PSYCH: AAOx3  NEUROLOGY: non-focal  SKIN: No rashes or lesions    LABS:                        9.8    16.01 )-----------( 368      ( 2018 03:20 )             28.8     04-08    125<L>  |  91<L>  |  8   ----------------------------<  151<H>  3.8   |  22  |  0.67    Ca    7.7<L>      2018 06:15    TPro  6.3  /  Alb  2.2<L>  /  TBili  0.8  /  DBili  x   /  AST  19  /  ALT  23  /  AlkPhos  98  04-08    PT/INR - ( 2018 03:20 )   PT: 13.3 SEC;   INR: 1.15          PTT - ( 2018 03:20 )  PTT:27.1 SEC      Urinalysis Basic - ( 2018 05:37 )    Color: PLYEL / Appearance: CLEAR / S.007 / pH: 7.0  Gluc: NEGATIVE / Ketone: TRACE  / Bili: NEGATIVE / Urobili: NORMAL mg/dL   Blood: NEGATIVE / Protein: NEGATIVE mg/dL / Nitrite: NEGATIVE   Leuk Esterase: SMALL / RBC: 0-2 / WBC 5-10   Sq Epi: OCC / Non Sq Epi: x / Bacteria: FEW      Specimen Source: .Blood Blood (18 @ 22:50)    Culture Results:   No growth to date. (18 @ 22:50)    Culture Results:   No growth to date. (18 @ 22:49)    Culture Results:   No growth to date. (18 @ 23:42)    Culture Results:   No growth to date. (18 @ 23:42)    Culture Results:   No growth to date. (18 @ 23:42)      Specimen Source: .Urine Clean Catch (Midstream) (18 @ 22:40)    Culture - Urine (18 @ 22:40)    -  Ampicillin: S 4    -  Ciprofloxacin: S <=1    -  Nitrofurantoin: S <=32    -  Tetra/Doxy: R >8    -  Vancomycin: S 2    Specimen Source: .Urine Clean Catch (Midstream)    Culture Results:   10,000 - 49,000 CFU/mL Enterococcus faecalis    Organism Identification: Enterococcus faecalis    Organism: Enterococcus faecalis    Method Type: KYLE      RADIOLOGY & ADDITIONAL TESTS:    Imaging Personally Reviewed:< from: CT Abdomen and Pelvis w/ IV Cont (18 @ 12:00) >  EXAM:  CT ABDOMEN AND PELVIS IC        PROCEDURE DATE:  2018         INTERPRETATION:  CLINICAL INFORMATION: Upper GI bleed, gastric ulcer,   endoscopy cannot rule out underlying mass, evaluate for neoplasm    COMPARISON: None.    PROCEDURE:   CT of the Abdomen and Pelvis was performed with intravenous contrast.   Intravenous contrast: 90 ml Omnipaque 350. 10 ml discarded.  Oral contrast: None.  Sagittal and coronal reformats were performed.    FINDINGS:    LOWER CHEST: Trace bilateral pleural effusions, left greater than right,   associated with compressive atelectasis. Coronary artery calcifications.   Aortic valvular and mitral annular calcifications. Mild cardiomegaly. 8   mm right outer breast nodule (2:6).    LIVER: Right hepatic cyst and a few subcentimeter hypodensities too small   to characterize.  BILE DUCTS: Normal caliber.  GALLBLADDER: Within normal limits.  SPLEEN: Within normal limits.  PANCREAS: Within normal limits.  ADRENALS: Within normal limits.  KIDNEYS/URETERS: Subcentimeter hypodensity in the left upper renal pole   too small to characterize. No hydronephrosis.    BLADDER: Within normal limits.  REPRODUCTIVE ORGANS: Endometrium measures up to 5 mm. No adnexal masses.    BOWEL: Limited evaluation of the stomach secondary to lack of oral   contrast. No bowel obstruction. Appendix is normal.  PERITONEUM: No ascites.  VESSELS:  Filling defect within the infrarenal IVC just above the   bifurcation and filling defects within the bilateral common iliac veins,   compatible with thrombus. Atherosclerotic calcifications of the aorta and   its branches. Retroaortic left renal vein. Replaced right hepatic artery   from the SMA.  RETROPERITONEUM: No lymphadenopathy.    ABDOMINAL WALL: Small fat-containing umbilical hernia containing   nonobstructed small bowel. Multiloculated peripherally enhancing fluid   collection tracking along the medial aspect of the left psoas muscle   measuring 2.6 x 1.3 x 10.0 cm. Subcutaneous edema. Fluid in the presacral   space.  BONES: Mild bilateral sacroiliac joint arthrosis. Spinal degenerative   changes.    IMPRESSION:   Multiloculated peripherally enhancing fluid collection tracking down the   left psoas muscle, likely an abscess.    Thrombus within the infrarenal IVC and bilateral common iliac veins.    Limited evaluation of the stomach secondary to underdistention/ lack of   oral contrast.     8 mm right outer breast nodule, for which correlation with mammography is   recommended.    < end of copied text >    < from: Transthoracic Echocardiogram (18 @ 10:32) >    Patient name: KASH HALL  YOB: 1950   Age: 68 (F)   MR#: [id number]  Study Date: 2018  Location: [address] [place], [place] [address]Sonographer: Madina Cortez [facility]  Study quality: Fair  Referring Physician: Nichol Chi MD  Blood Pressure: 117/58 mmHg  Height: 158 cm  Weight: 63 kg  BSA: 1.6 m2  ------------------------------------------------------------------------    PROCEDURE: Transthoracic echocardiogram with 2-D, M-Mode  and complete spectral and color flow Doppler.  INDICATION: Syncope and collapse (R55)  HISTORY:  ------------------------------------------------------------------------  DIMENSIONS:  Dimensions:     Normal Values:  LA:     3.9 cm    2.0 - 4.0 cm  Ao:     3.4 cm    2.0 - 3.8 cm  SEPTUM: 1.1 cm    0.6 - 1.2 cm  PWT:    1.0 cm    0.6 - 1.1 cm  LVIDd:  5.3 cm    3.0 - 5.6 cm  LVIDs:  3.7 cm    1.8 - 4.0 cm      Derived Variables:  LVMI: 130 g/m2  RWT: 0.37  Ejection Fraction Visual Estimate: >55 %    ------------------------------------------------------------------------  OBSERVATIONS:  Mitral Valve: Mitral annular calcification. Trace mitral  regurgitation.  Aortic Root: Normal aortic root.  Aortic Valve: Normal trileaflet aortic valve. Mean  transaortic valve gradient equals 8 mm Hg, consistent with  normal aortic stenosis.  Left Atrium: Normal left atrium.  LA volume index = 30  cc/m2.  Left Ventricle: Endocardium not well visualized; grossly  normal left ventricular systolic function. Mild diastolic  dysfunction (stage I). Mild concentric left ventricular  hypertrophy.  Right Heart: Normal right atrium. Normal right ventricular  size and function. There is trace tricuspid regurgitation.  There is trace pulmonic regurgitation.  Pericardium/PleuraNormal pericardium with no pericardial  effusion.  Hemodynamic: RA Pressure is 10 mm Hg. RV systolic pressure  is 35 mm Hg.  ------------------------------------------------------------------------  CONCLUSIONS:  1. Mitral annular calcification. Trace mitral  regurgitation.  2. Mild concentric left ventricular hypertrophy.  3. Endocardium not well visualized; grossly normal left  ventricular systolic function. Mild diastolic dysfunction  (stage I).  4. Normal right ventricular size and function.    < end of copied text >    Consultant(s) Notes Reviewed:      Care Discussed with Consultants/Other Providers:

## 2018-04-08 NOTE — CONSULT NOTE ADULT - SUBJECTIVE AND OBJECTIVE BOX
69yo with hx DM, HLD, HTN, initially presented to [facility] on  with 2 weeks of back pain and fevers, found to have posterior epidural abscess (L1-L4) on MRI. During her hospitalization at [facility], she suffered an upper GI bleed resulting in low Hct requiring 4u pRBCs and 1u platelets. Urgent upper endoscopy by GI saw a "large irregular broad based necrotic ulcer in the antrum" but no active bleeding; the ulcer was biopsied. Pt was transferred to [facility] for higher level of care for her epidural abscess.    Currently, she complains of back pain and some mild left upper quadrant pain. She denies any change in bowel habits, no blood in her stool. No urinary symptoms. She has never had bleeding episodes before.     She reports occasional bilateral lateral upper leg pain. Denies pain with ambulation or at rest. No swelling. No skin changes.    PMH  Acute gastric ulcer with hemorrhage  No pertinent past medical history  Essential hypertension  Type 2 diabetes mellitus without complication  HLD (hyperlipidemia)    PSH  No significant past surgical history  H/O tubal ligation    Allergies  No Known Allergies    Physical Exam  T(C): 36.8 (18 @ 18:13), Max: 37.9 (18 @ 01:24)  HR: 97 (18 @ 18:13) (84 - 99)  BP: 121/82 (18 @ 18:13) (120/74 - 153/84)  RR: 20 (18 @ 18:13) (15 - 33)  SpO2: 97% (18 @ 18:13) (97% - 100%)  Wt(kg): --  Tmax: T(C): , Max: 37.9 (18 @ 01:24)  Wt(kg): --    Gen: NAD  HEENT: normocephalic, atraumatic, no scleral icterus  Abd: Soft, obese, ND, mild LUQ tenderness, no rebound, no guarding, no palpable organomegaly/masses  Ext: warm, no edema, palp dp/pt    18  -  18  --------------------------------------------------------  IN:  Total IN: 0 mL    OUT:    Voided: 1150 mL  Total OUT: 1150 mL    Total NET: -1150 mL      18  -  18  --------------------------------------------------------  IN:  Total IN: 0 mL    OUT:    Voided: 400 mL  Total OUT: 400 mL    Total NET: -400 mL    Labs:                        9.8    16.01 )-----------( 368      ( 2018 03:20 )             28.8     04-08    125<L>  |  91<L>  |  8   ----------------------------<  151<H>  3.8   |  22  |  0.67    Ca    7.7<L>      2018 06:15    TPro  6.3  /  Alb  2.2<L>  /  TBili  0.8  /  DBili  x   /  AST  19  /  ALT  23  /  AlkPhos  98  04-08    PT/INR - ( 2018 03:20 )   PT: 13.3 SEC;   INR: 1.15          PTT - ( 2018 03:20 )  PTT:27.1 SEC  Urinalysis Basic - ( 2018 05:37 )    Color: PLYEL / Appearance: CLEAR / S.007 / pH: 7.0  Gluc: NEGATIVE / Ketone: TRACE  / Bili: NEGATIVE / Urobili: NORMAL mg/dL   Blood: NEGATIVE / Protein: NEGATIVE mg/dL / Nitrite: NEGATIVE   Leuk Esterase: SMALL / RBC: 0-2 / WBC 5-10   Sq Epi: OCC / Non Sq Epi: x / Bacteria: FEW      Imaging    < from: CT Abdomen and Pelvis w/ IV Cont (18 @ 12:00) >  IMPRESSION:   Multiloculated peripherally enhancing fluid collection tracking down the   left psoas muscle, likely an abscess.    Thrombus within the infrarenal IVC and bilateral common iliac veins.    Limited evaluation of the stomach secondary to underdistention/ lack of   oral contrast.     8 mm right outer breast nodule, for which correlation with mammography is   recommended.    < end of copied text >

## 2018-04-08 NOTE — CONSULT NOTE ADULT - ASSESSMENT
68 female with DM2, HTN, HLD - gastric ulcer transferred from [facility] 4/5/18 for management of left sided posterior epidural abscess extending from L1-L4. She was on vancomycin since April 4th and cefepime since April 6th. She had zosyn from 4/4 to 4/5. She had fevers at . WBC improved from 28 to 16. Urine culture grew a sensitive enterococcus.   Epidural abscess and psoas abscess  source control- abscess needs to be drained- IR drainage is a consideration  check blood cultures  continue Vancomycin  monitor Vancomycin trough  continue Cefepime 68 f with DM2, HTN, HLD - gastric ulcer presented to [facility] 4/5/18 for fever and back pain, was found to have L1-L4 left sided posterior epidural abscess with discitis and osteomyelitis.   urine cx: E. faecalis  pt received vanco and zosyn then switched to Imipenem as patient was febrile, then transferred to [facility] for abscess drainage, here abd CT showed also psoas abscess with extensive DVT in b/l iliac and infrarenal IVC  pt is from , denied any TB exposure    Left Epidural L1-L4 and psoas abscess with osteomyelitis and discitis  extensive DVT in b/l iliac veins and infrarenal IVC  leukocytosis    will need some type abscess drainage, psoas or epidural drainage, cultures for AFB, fungal and regular cx  check brucella serolgoy  check blood cultures  continue Vancomycin 1 g q 12  monitor Vancomycin trough  continue Cefepime 2 g q8

## 2018-04-08 NOTE — CONSULT NOTE ADULT - SUBJECTIVE AND OBJECTIVE BOX
Chief Complaint:  Patient is a 68y old  Female who presents with a chief complaint of Lumbar epidural abscess (2018 03:15)      HPI:  67 YO female was admitted to [facility] 18 with 2 weeks of back pain and fevers found to have epidural abscess now on IV abx, hospital course cb coffee ground emesis with Hb of 5.9 requiring ICU admission and 4 u PRBC and 2 units platelets s/p EGD showing gastric mass. Now transferred to [facility] for management of epidural abscess by neurosurgery (no plan for intervention) found to have bilateral DVT requiring anticoagulation. GI now consulted for management of EGD findings as well as AC recommendations.    EGD was done at [facility]     Since transfer from [facility], no reported coffee ground emesis. Hb and vitals have been stable. CTAP was done showing thrombus within the infrarenal IVC and bilateral common iliac veins. GI consulted prior to starting AC.       Allergies:  No Known Allergies      Home Medications:    Hospital Medications:  acetaminophen   Tablet 650 milliGRAM(s) Oral every 6 hours PRN  acetaminophen   Tablet. 650 milliGRAM(s) Oral every 6 hours PRN  atorvastatin 40 milliGRAM(s) Oral at bedtime  cefepime  IVPB 2000 milliGRAM(s) IV Intermittent every 8 hours  docusate sodium 100 milliGRAM(s) Oral three times a day  insulin lispro (HumaLOG) corrective regimen sliding scale   SubCutaneous every 6 hours  lidocaine   Patch 1 Patch Transdermal daily  morphine  - Injectable 2 milliGRAM(s) IV Push every 4 hours PRN  oxyCODONE    IR 5 milliGRAM(s) Oral every 4 hours PRN  pantoprazole  Injectable 40 milliGRAM(s) IV Push two times a day  senna 2 Tablet(s) Oral at bedtime  sodium chloride 2 Gram(s) Oral three times a day  sodium chloride 0.9% with potassium chloride 20 mEq/L 1000 milliLiter(s) IV Continuous <Continuous>  sucralfate 1 Gram(s) Oral four times a day  vancomycin  IVPB 1250 milliGRAM(s) IV Intermittent every 12 hours      PMHX/PSHX:  Acute gastric ulcer with hemorrhage  No pertinent past medical history  Essential hypertension  Type 2 diabetes mellitus without complication  HLD (hyperlipidemia)  No significant past surgical history  H/O tubal ligation      Family history:  No pertinent family history in first degree relatives      Social History:     ROS:     General:  No wt loss, fevers, chills, night sweats, fatigue,   Eyes:  Good vision, no reported pain  ENT:  No sore throat, pain, runny nose, dysphagia  CV:  No pain, palpitations, hypo/hypertension  Resp:  No dyspnea, cough, tachypnea, wheezing  GI:  See HPI  :  No pain, bleeding, incontinence, nocturia  Muscle:  No pain, weakness  Neuro:  No weakness, tingling, memory problems  Psych:  No fatigue, insomnia, mood problems, depression  Endocrine:  No polyuria, polydipsia, cold/heat intolerance  Heme:  No petechiae, ecchymosis, easy bruisability  Skin:  No rash, edema      PHYSICAL EXAM:     GENERAL:  Appears stated age, well-groomed, well-nourished, no distress  HEENT:  NC/AT,  conjunctivae clear and pink,  no JVD  CHEST:  Full & symmetric excursion, no increased effort, breath sounds clear  HEART:  Regular rhythm, S1, S2, no murmur/rub/S3/S4, no abdominal bruit, no edema  ABDOMEN:  Soft, non-tender, non-distended, normoactive bowel sounds,  no masses ,  EXTREMITIES:  no cyanosis,clubbing or edema  SKIN:  No rash/erythema/ecchymoses/petechiae/wounds/abscess/warm/dry  NEURO:  Alert, oriented    Vital Signs:  Vital Signs Last 24 Hrs  T(C): 37.2 (2018 10:07), Max: 37.9 (2018 01:24)  T(F): 98.9 (2018 10:07), Max: 100.2 (2018 01:24)  HR: 84 (2018 10:07) (84 - 99)  BP: 128/83 (2018 10:07) (120/74 - 153/84)  BP(mean): 86 (2018 04:00) (85 - 110)  RR: 17 (2018 10:07) (15 - 33)  SpO2: 98% (2018 10:07) (94% - 100%)  Daily Height in cm: 134.62 (2018 04:48)    Daily Weight in k.3 (2018 01:24)    LABS:                        9.8    16.01 )-----------( 368      ( 2018 03:20 )             28.8     04-08    125<L>  |  91<L>  |  8   ----------------------------<  151<H>  3.8   |  22  |  0.67    Ca    7.7<L>      2018 06:15    TPro  6.3  /  Alb  2.2<L>  /  TBili  0.8  /  DBili  x   /  AST  19  /  ALT  23  /  AlkPhos  98  04-08    LIVER FUNCTIONS - ( 2018 03:20 )  Alb: 2.2 g/dL / Pro: 6.3 g/dL / ALK PHOS: 98 u/L / ALT: 23 u/L / AST: 19 u/L / GGT: x           PT/INR - ( 2018 03:20 )   PT: 13.3 SEC;   INR: 1.15          PTT - ( 2018 03:20 )  PTT:27.1 SEC  Urinalysis Basic - ( 2018 05:37 )    Color: PLYEL / Appearance: CLEAR / S.007 / pH: 7.0  Gluc: NEGATIVE / Ketone: TRACE  / Bili: NEGATIVE / Urobili: NORMAL mg/dL   Blood: NEGATIVE / Protein: NEGATIVE mg/dL / Nitrite: NEGATIVE   Leuk Esterase: SMALL / RBC: 0-2 / WBC 5-10   Sq Epi: OCC / Non Sq Epi: x / Bacteria: FEW          Imaging:    FINDINGS:    LOWER CHEST: Trace bilateral pleural effusions, left greater than right,   associated with compressive atelectasis. Coronary artery calcifications.   Aortic valvular and mitral annular calcifications. Mild cardiomegaly. 8   mm right outer breast nodule (2:6).    LIVER: Right hepatic cyst and a few subcentimeter hypodensities too small   to characterize.  BILE DUCTS: Normal caliber.  GALLBLADDER: Within normal limits.  SPLEEN: Within normal limits.  PANCREAS: Within normal limits.  ADRENALS: Within normal limits.  KIDNEYS/URETERS: Subcentimeter hypodensity in the left upper renal pole   too small to characterize. No hydronephrosis.    BLADDER: Within normal limits.  REPRODUCTIVE ORGANS: Endometrium measures up to 5 mm. No adnexal masses.    BOWEL: Limited evaluation of the stomach secondary to lack of oral   contrast. No bowel obstruction. Appendix is normal.  PERITONEUM: No ascites.  VESSELS:  Filling defect within the infrarenal IVC just above the   bifurcation and filling defects within the bilateral common iliac veins,   compatible with thrombus. Atherosclerotic calcifications of the aorta and   its branches. Retroaortic left renal vein. Replaced right hepatic artery   from the SMA.  RETROPERITONEUM: No lymphadenopathy.    ABDOMINAL WALL: Small fat-containing umbilical hernia containing   nonobstructed small bowel. Multiloculated peripherally enhancing fluid   collection tracking along the medial aspect of the left psoas muscle   measuring 2.6 x 1.3 x 10.0 cm. Subcutaneous edema. Fluid in the presacral   space.  BONES: Mild bilateral sacroiliac joint arthrosis. Spinal degenerative   changes.    IMPRESSION:   Multiloculated peripherally enhancing fluid collection tracking down the   left psoas muscle, likely an abscess.    Thrombus within the infrarenal IVC and bilateral common iliac veins.    Limited evaluation of the stomach secondary to underdistention/ lack of   oral contrast.     8 mm right outer breast nodule, for which correlation with mammography is   recommended. Chief Complaint:  Patient is a 68y old  Female who presents with a chief complaint of Lumbar epidural abscess (2018 03:15)      HPI:  69 YO female was admitted to [facility] 18 with 2 weeks of back pain and fevers found to have epidural abscess now on IV abx, hospital course cb coffee ground emesis with Hb of 5.9 requiring ICU admission and 4 u PRBC and 2 units platelets s/p EGD showing gastric mass. Now transferred to [facility] for management of epidural abscess by neurosurgery (no plan for intervention) found to have bilateral DVT requiring anticoagulation. GI now consulted for management of EGD findings as well as AC recommendations.    EGD was done at [place] on  which showed large necrotic gastric ulcer, biopsies taken. Concerning for malignancy. CTAP was ordered and patient was transferred to [facility] for management of epidural abscess.    Since transfer from [place], no reported coffee ground emesis. Hb and vitals have been stable. CTAP had limited evaluation of the stomach because no oral contrast. It also showed thrombus within the infrarenal IVC and bilateral common iliac veins. GI consulted prior to starting AC.     Since patient has been at [facility], medical staff and patient report no melena or BRBpR. Hb and vitals stable. Denies any abdominal pain except when coughing. Tolerating PO. Denies any diarrhea, N/V.      Allergies:  No Known Allergies      Home Medications:    Hospital Medications:  acetaminophen   Tablet 650 milliGRAM(s) Oral every 6 hours PRN  acetaminophen   Tablet. 650 milliGRAM(s) Oral every 6 hours PRN  atorvastatin 40 milliGRAM(s) Oral at bedtime  cefepime  IVPB 2000 milliGRAM(s) IV Intermittent every 8 hours  docusate sodium 100 milliGRAM(s) Oral three times a day  insulin lispro (HumaLOG) corrective regimen sliding scale   SubCutaneous every 6 hours  lidocaine   Patch 1 Patch Transdermal daily  morphine  - Injectable 2 milliGRAM(s) IV Push every 4 hours PRN  oxyCODONE    IR 5 milliGRAM(s) Oral every 4 hours PRN  pantoprazole  Injectable 40 milliGRAM(s) IV Push two times a day  senna 2 Tablet(s) Oral at bedtime  sodium chloride 2 Gram(s) Oral three times a day  sodium chloride 0.9% with potassium chloride 20 mEq/L 1000 milliLiter(s) IV Continuous <Continuous>  sucralfate 1 Gram(s) Oral four times a day  vancomycin  IVPB 1250 milliGRAM(s) IV Intermittent every 12 hours      PMHX/PSHX:  Acute gastric ulcer with hemorrhage  No pertinent past medical history  Essential hypertension  Type 2 diabetes mellitus without complication  HLD (hyperlipidemia)  No significant past surgical history  H/O tubal ligation      Family history:  No pertinent family history in first degree relatives      Social History:     ROS:     General:  No wt loss, fevers, chills, night sweats, fatigue,   Eyes:  Good vision, no reported pain  ENT:  No sore throat, pain, runny nose, dysphagia  CV:  No pain, palpitations, hypo/hypertension  Resp:  No dyspnea, cough, tachypnea, wheezing  GI:  See HPI  :  No pain, bleeding, incontinence, nocturia  Muscle:  No pain, weakness  Neuro:  No weakness, tingling, memory problems  Psych:  No fatigue, insomnia, mood problems, depression  Endocrine:  No polyuria, polydipsia, cold/heat intolerance  Heme:  No petechiae, ecchymosis, easy bruisability  Skin:  No rash, edema      PHYSICAL EXAM:     GENERAL:  Appears stated age, well-groomed, well-nourished, no distress  HEENT:  NC/AT,  conjunctivae clear and pink,  no JVD  CHEST:  Full & symmetric excursion, no increased effort, breath sounds clear  HEART:  Regular rhythm, S1, S2, no murmur/rub/S3/S4, no abdominal bruit, no edema  ABDOMEN:  Soft, non-tender, non-distended, normoactive bowel sounds,  no masses ,  EXTREMITIES:  no cyanosis,clubbing or edema  SKIN:  No rash/erythema/ecchymoses/petechiae/wounds/abscess/warm/dry  NEURO:  Alert, oriented    Vital Signs:  Vital Signs Last 24 Hrs  T(C): 37.2 (2018 10:07), Max: 37.9 (2018 01:24)  T(F): 98.9 (2018 10:07), Max: 100.2 (2018 01:24)  HR: 84 (2018 10:07) (84 - 99)  BP: 128/83 (2018 10:07) (120/74 - 153/84)  BP(mean): 86 (2018 04:00) (85 - 110)  RR: 17 (2018 10:07) (15 - 33)  SpO2: 98% (2018 10:07) (94% - 100%)  Daily Height in cm: 134.62 (2018 04:48)    Daily Weight in k.3 (2018 01:24)    LABS:                        9.8    16.01 )-----------( 368      ( 2018 03:20 )             28.8     04-08    125<L>  |  91<L>  |  8   ----------------------------<  151<H>  3.8   |  22  |  0.67    Ca    7.7<L>      2018 06:15    TPro  6.3  /  Alb  2.2<L>  /  TBili  0.8  /  DBili  x   /  AST  19  /  ALT  23  /  AlkPhos  98  04-08    LIVER FUNCTIONS - ( 2018 03:20 )  Alb: 2.2 g/dL / Pro: 6.3 g/dL / ALK PHOS: 98 u/L / ALT: 23 u/L / AST: 19 u/L / GGT: x           PT/INR - ( 2018 03:20 )   PT: 13.3 SEC;   INR: 1.15          PTT - ( 2018 03:20 )  PTT:27.1 SEC  Urinalysis Basic - ( 2018 05:37 )    Color: PLYEL / Appearance: CLEAR / S.007 / pH: 7.0  Gluc: NEGATIVE / Ketone: TRACE  / Bili: NEGATIVE / Urobili: NORMAL mg/dL   Blood: NEGATIVE / Protein: NEGATIVE mg/dL / Nitrite: NEGATIVE   Leuk Esterase: SMALL / RBC: 0-2 / WBC 5-10   Sq Epi: OCC / Non Sq Epi: x / Bacteria: FEW          Imaging:    FINDINGS:    LOWER CHEST: Trace bilateral pleural effusions, left greater than right,   associated with compressive atelectasis. Coronary artery calcifications.   Aortic valvular and mitral annular calcifications. Mild cardiomegaly. 8   mm right outer breast nodule (2:6).    LIVER: Right hepatic cyst and a few subcentimeter hypodensities too small   to characterize.  BILE DUCTS: Normal caliber.  GALLBLADDER: Within normal limits.  SPLEEN: Within normal limits.  PANCREAS: Within normal limits.  ADRENALS: Within normal limits.  KIDNEYS/URETERS: Subcentimeter hypodensity in the left upper renal pole   too small to characterize. No hydronephrosis.    BLADDER: Within normal limits.  REPRODUCTIVE ORGANS: Endometrium measures up to 5 mm. No adnexal masses.    BOWEL: Limited evaluation of the stomach secondary to lack of oral   contrast. No bowel obstruction. Appendix is normal.  PERITONEUM: No ascites.  VESSELS:  Filling defect within the infrarenal IVC just above the   bifurcation and filling defects within the bilateral common iliac veins,   compatible with thrombus. Atherosclerotic calcifications of the aorta and   its branches. Retroaortic left renal vein. Replaced right hepatic artery   from the SMA.  RETROPERITONEUM: No lymphadenopathy.    ABDOMINAL WALL: Small fat-containing umbilical hernia containing   nonobstructed small bowel. Multiloculated peripherally enhancing fluid   collection tracking along the medial aspect of the left psoas muscle   measuring 2.6 x 1.3 x 10.0 cm. Subcutaneous edema. Fluid in the presacral   space.  BONES: Mild bilateral sacroiliac joint arthrosis. Spinal degenerative   changes.    IMPRESSION:   Multiloculated peripherally enhancing fluid collection tracking down the   left psoas muscle, likely an abscess.    Thrombus within the infrarenal IVC and bilateral common iliac veins.    Limited evaluation of the stomach secondary to underdistention/ lack of   oral contrast.     8 mm right outer breast nodule, for which correlation with mammography is   recommended.

## 2018-04-08 NOTE — CONSULT NOTE ADULT - PROBLEM SELECTOR RECOMMENDATION 7
-CT A/P done-showed thrombus in infrarenal IVC and b/l common illiac veins  -hold AC  -consider vascular consult

## 2018-04-08 NOTE — CONSULT NOTE ADULT - ATTENDING COMMENTS
Agree with above. Patient with anemia. EGD showed an ulcer concerning for a malignancy. patient transferred to [facility].  Patient will need a repeat EGD to establish a diagnosis. Will plan for tomorrow. Pls make NPO past midnight.
Statement Selected

## 2018-04-08 NOTE — CONSULT NOTE ADULT - ASSESSMENT
69yo F admitted for posterior epidural abscess now with upper GI bleed s/p EGD showing large antral ulcer s/p biopsy, concern for malignancy  - f/u pathology from [facility] from EGD  - staging CT scans (chest non-contrast)  - will follow    D team  [id number]

## 2018-04-08 NOTE — CONSULT NOTE ADULT - SUBJECTIVE AND OBJECTIVE BOX
69yo with hx DM, HLD, HTN, initially presented to [facility] on  with 2 weeks of back pain and fevers, found to have posterior epidural abscess (L1-L4) on MRI. During her hospitalization at [facility], she suffered an upper GI bleed resulting in low Hct requiring 4u pRBCs and 1u platelets. Urgent upper endoscopy by GI saw a "large irregular broad based necrotic ulcer in the antrum" but no active bleeding; the ulcer was biopsied. Pt was transferred to [facility] for higher level of care for her epidural abscess.    Currently, she complains of back pain and some mild left upper quadrant pain. She denies any change in bowel habits, no blood in her stool. No urinary symptoms. She has never had bleeding episodes before.         PMH  Acute gastric ulcer with hemorrhage  Essential hypertension  Type 2 diabetes mellitus without complication  HLD (hyperlipidemia)    PSH  No significant past surgical history  H/O tubal ligation    Allergies  No Known Allergies    Physical Exam  T(C): 36.8 (18 @ 18:13), Max: 37.9 (18 @ 01:24)  HR: 97 (18 @ 18:13) (84 - 99)  BP: 121/82 (18 @ 18:13) (120/74 - 153/84)  RR: 20 (18 @ 18:13) (15 - 33)  SpO2: 97% (18 @ 18:13) (94% - 100%)  Wt(kg): --  Tmax: T(C): , Max: 37.9 (18 @ 01:24)  Wt(kg): --    Gen: NAD  HEENT: normocephalic, atraumatic, no scleral icterus  Abd: Soft, obese, ND, mild LUQ tenderness, no rebound, no guarding, no palpable organomegaly/masses  Ext: warm, no edema, palp dp/pt    18  -  18  --------------------------------------------------------  IN:  Total IN: 0 mL    OUT:    Voided: 1150 mL  Total OUT: 1150 mL    Total NET: -1150 mL      -18  -  18  --------------------------------------------------------  IN:  Total IN: 0 mL    OUT:    Voided: 400 mL  Total OUT: 400 mL    Total NET: -400 mL      Labs:                        9.8    16.01 )-----------( 368      ( 2018 03:20 )             28.8         125<L>  |  91<L>  |  8   ----------------------------<  151<H>  3.8   |  22  |  0.67    Ca    7.7<L>      2018 06:15    TPro  6.3  /  Alb  2.2<L>  /  TBili  0.8  /  DBili  x   /  AST  19  /  ALT  23  /  AlkPhos  98  04-08    PT/INR - ( 2018 03:20 )   PT: 13.3 SEC;   INR: 1.15          PTT - ( 2018 03:20 )  PTT:27.1 SEC  Urinalysis Basic - ( 2018 05:37 )    Color: PLYEL / Appearance: CLEAR / S.007 / pH: 7.0  Gluc: NEGATIVE / Ketone: TRACE  / Bili: NEGATIVE / Urobili: NORMAL mg/dL   Blood: NEGATIVE / Protein: NEGATIVE mg/dL / Nitrite: NEGATIVE   Leuk Esterase: SMALL / RBC: 0-2 / WBC 5-10   Sq Epi: OCC / Non Sq Epi: x / Bacteria: FEW      Imaging    < from: CT Abdomen and Pelvis w/ IV Cont (18 @ 12:00) >  IMPRESSION:   Multiloculated peripherally enhancing fluid collection tracking down the   left psoas muscle, likely an abscess.    Thrombus within the infrarenal IVC and bilateral common iliac veins.    Limited evaluation of the stomach secondary to underdistention/ lack of   oral contrast.     8 mm right outer breast nodule, for which correlation with mammography is   recommended.    < end of copied text >

## 2018-04-08 NOTE — CONSULT NOTE ADULT - PROBLEM SELECTOR RECOMMENDATION 2
s/p 4 U prbc with appropriate rise   -monitor H/H  -NPO except meds PPI and sucralfate  -GI consulted due to concern for poss malignancy on EGD   -Ct chest   -f/u path of prior EGD

## 2018-04-08 NOTE — CONSULT NOTE ADULT - ASSESSMENT
Pt is 69 yo F w/ hx HTN, DM p/w LBP x 2 weeks found to have epidural abscess on MRI  L1-L4 without focal deficits with possible discitis at L4-L5 complicated by acute GIB ( s/p transfusion 4 u prbc and 1 u plts) secondary to NSAID use EGD done showed concern for malignancy. CT A/P done  Multiloculated peripherally enhancing fluid collection tracking along the medial aspect of the left psoas muscle measuring 2.6 x 1.3 x 10.0 cm and infrarenal IVC thrombus and b/l common illiac thrombus.

## 2018-04-08 NOTE — H&P ADULT - ASSESSMENT
67 YO female with back pain, L1-4 epidural abscess, upper GI bleed with a gastric ulcer, and hyponatremia

## 2018-04-08 NOTE — CONSULT NOTE ADULT - SUBJECTIVE AND OBJECTIVE BOX
Infectious Diseases Consult note  Patient is a 68y old  Female who presents with a chief complaint of Lumbar epidural abscess (2018 03:15)    KASH HALL  MRN-[id number]  HPI:  69 YO female was admitted to [facility] 18 with 2 weeks of back pain and fevers. Patient was unable to ambulate due to pain but had no focal weakness, no sensory loss, no bowel or bladder dysfunction. MRI of the lumbar spine revealed a left sided posterior epidural abscess extending from L1-L4. Her hospital course was complicated by an upper GI bleed requiring transfusions of 4U PRBC and 1U of platelets. (2018 03:15)      No sick contacts. No recent travel. No recent antibiotics.       REVIEW OF SYSTEMS  All as below unless noted otherwise    General:  Denies fever and chills. 	  Ophthalmologic: Denies any visual complaints. 	  ENMT: No throat pain.  Respiratory: No cough, sputum. No shortness of breath.   Cardiovascular: No chest pain.   Gastrointestinal: No nausea or vomiting. No abdominal pain or diarrhea.   Genitourinary: No burning urine, no frequency. No flank pain  Musculoskeletal: No joint swelling or pain.   Neurological: No confusion. 	  Skin: No rash    PAST MEDICAL & SURGICAL HISTORY:  Acute gastric ulcer with hemorrhage  Essential hypertension  Type 2 diabetes mellitus without complication  HLD (hyperlipidemia)  H/O tubal ligation    FAMILY HISTORY:  No pertinent family history in first degree relatives    SOCIAL HISTORY:  non smoker      ANTIMICROBIALS:    cefepime  IVPB 2000 every 8 hours  vancomycin  IVPB 1250 every 12 hours    OTHER MEDS:    acetaminophen   Tablet 650 milliGRAM(s) Oral every 6 hours PRN  acetaminophen   Tablet. 650 milliGRAM(s) Oral every 6 hours PRN  atorvastatin 40 milliGRAM(s) Oral at bedtime  docusate sodium 100 milliGRAM(s) Oral three times a day  insulin lispro (HumaLOG) corrective regimen sliding scale   SubCutaneous every 6 hours  lidocaine   Patch 1 Patch Transdermal daily  morphine  - Injectable 2 milliGRAM(s) IV Push every 4 hours PRN  oxyCODONE    IR 5 milliGRAM(s) Oral every 4 hours PRN  pantoprazole  Injectable 40 milliGRAM(s) IV Push two times a day  senna 2 Tablet(s) Oral at bedtime  sodium chloride 2 Gram(s) Oral three times a day  sodium chloride 0.9% with potassium chloride 20 mEq/L 1000 milliLiter(s) IV Continuous <Continuous>  sucralfate 1 Gram(s) Oral four times a day    Allergies    No Known Allergies    Intolerances      Vital Signs Last 24 Hrs  T(C): 37.2 (2018 10:07), Max: 37.9 (2018 01:24)  T(F): 98.9 (2018 10:07), Max: 100.2 (2018 01:24)  HR: 84 (2018 10:07) (84 - 99)  BP: 128/83 (2018 10:07) (120/74 - 153/84)  BP(mean): 86 (2018 04:00) (85 - 110)  RR: 17 (2018 10:07) (15 - 33)  SpO2: 98% (2018 10:07) (94% - 100%)  CAPILLARY BLOOD GLUCOSE      POCT Blood Glucose.: 139 mg/dL (2018 05:57)    Daily Height in cm: 134.62 (2018 04:48)    Daily Weight in k.3 (2018 01:24)  T(C): 37.2 (18 @ 10:07), Max: 38.7 (18 @ 18:00)  T(F): 98.9 (18 @ 10:07), Max: 101.6 (18 @ 18:00)    PHYSICAL EXAM:  patient in no acute respiratory distress.  Constitutional: Comfortable. Awake and alert  Eyes: PERRL EOMI. No discharge.  ENMT: No sinus tenderness.  No pharyngeal erythema.   Neck: Supple. No thyromegaly   Respiratory:  air entry bilaterally, no wheezes, rhonchi, or crackles  Cardiovascular:S1 S2 wnl, No murmurs  Gastrointestinal: Soft, no tenderness , bowel sounds present,   Genitourinary: No suprapubic tenderness. no CVA tenderness   Musculoskeletal: No joint swelling. No edema.  Vascular: peripheral pulses felt  Neurological: No grossly focal deficits  Skin: No rash   Lymph Nodes: No palpable Lymphadenopathy   Psychiatric: Affect normal  Lines:                           9.8    16.01 )-----------( 368      ( 2018 03:20 )             28.8     WBC Count: 16.01 ( @ 03:20)  WBC Count: 16.2 ( @ 17:06)  WBC Count: 16.3 ( @ 11:43)  WBC Count: 16.7 ( @ 03:55)  WBC Count: 16.9 ( @ 18:16)  WBC Count: 14.4 ( @ 13:50)  WBC Count: 18.0 ( @ 06:32)  WBC Count: 18.6 ( @ 01:47)  WBC Count: 20.2 ( @ 22:11)  WBC Count: 20.0 ( @ 12:06)  WBC Count: 18.7 ( @ 07:24)  WBC Count: 28.7 ( @ 18:45)  WBC Count: 23.4 ( @ 15:03)        125<L>  |  91<L>  |  8   ----------------------------<  151<H>  3.8   |  22  |  0.67    Ca    7.7<L>      2018 06:15    TPro  6.3  /  Alb  2.2<L>  /  TBili  0.8  /  DBili  x   /  AST  19  /  ALT  23  /  AlkPhos  98  04-08    Creatinine, Serum: 0.67 (-08)  Creatinine, Serum: 0.69 (08)  Creatinine, Serum: 0.58 ()  Creatinine, Serum: 0.58 ()  Creatinine, Serum: 0.67 ()  Creatinine, Serum: 0.92 ()  Creatinine, Serum: 0.80 (-)  Creatinine, Serum: 0.86 (-04)    PT/INR - ( 2018 03:20 )   PT: 13.3 SEC;   INR: 1.15          PTT - ( 2018 03:20 )  PTT:27.1 SEC          Urinalysis Basic - ( 2018 05:37 )    Color: PLYEL / Appearance: CLEAR / S.007 / pH: 7.0  Gluc: NEGATIVE / Ketone: TRACE  / Bili: NEGATIVE / Urobili: NORMAL mg/dL   Blood: NEGATIVE / Protein: NEGATIVE mg/dL / Nitrite: NEGATIVE   Leuk Esterase: SMALL / RBC: 0-2 / WBC 5-10   Sq Epi: OCC / Non Sq Epi: x / Bacteria: FEW          MICROBIOLOGY:    Culture - Blood (collected 2018 22:50)  Source: .Blood Blood  Preliminary Report (2018 23:01):    No growth to date.    Culture - Blood (collected 2018 22:49)  Source: .Blood Blood  Preliminary Report (2018 23:01):    No growth to date.          Vancomycin Level, Trough: 9.8 ug/mL (18 @ 17:09)  v      RADIOLOGY:    < from: MR Lumbar Spine w/ IV Cont (18 @ 20:12) >  FINDINGS:  There is a large left dorsal peripherally enhancing epidural collection   extending from approximately lower L1 through mid L4 levels, measuring   approximately 9.4 cm in CC dimension. It measures up to 1.6 x 0.9 cm in   TR by AP dimensions. There is mass effect on the thecal sac, which is   displaced anteriorly and to the right; the worse levels are at L2-L3 and   L3-L4, where this causes moderate to severe compression of the thecal sac   with near complete CSF effacement.    There is adjacent left paravertebral soft tissue thickening and   inflammatory signal, with mild lifting and thickening of the left psoas   muscle.    Mild diffuse hypointense T1 signal of the vertebral bodies is present.   There is mild abnormal T2/STIR hyperintensity of the L4 and L5 vertebral   bodies, with abnormal enhancement. Additionally, there is STIR   hyperintense signal of the L4-L5 intervertebral disc. These findings are   concerning for discitis/osteomyelitis of L4-L5.    Anterior to the L4-L5 disc and vertebral bodies, thereis a small focus   of T2 hyperintensity with a small apparent fluid collection/abscess   measuring 5 mm.    The conus medullaris is normal in signal and position. There is no   abnormal thickening or enhancement of the cauda equina nerve roots.    Mild dextroconvex curvature of the lumbar spine, with apex at L3.   Vertebral body heights are preserved. There is no fracture. There is   multilevel disc desiccation, with loss of height worse at L4-L5.    Findings by level;    T12-L1: No significant disc herniation, central canal stenosis or   neuroforaminal narrowing.    L1-L2: Mild diffuse disc bulge flattens the ventral thecal sac, without   intervening significant central canal stenosis or neural foraminal   narrowing.    L2-L3: The epidural collection, along with posterior disc bulge, and mild   facet arthropathy contributes to moderate-severe compression of the   thecal sac. No significant neuroforaminal narrowing.    L3-L4: The epidural collection, mild posterior disc bulge, mild facet   arthropathy with a small left facet joint effusion contributes to   moderate compression of the thecal sac. No significant neuroforaminal   narrowing.    L4-L5: Posterior disc osteophyte complex, facet arthropathy with moderate   right and trace left facet joint effusions and ligamenta flava thickening   contributes to mild central canal stenosis and moderate bilateral neural   foraminal narrowing. Moderate facet joint effusion extends   posterolaterally from the joints into the paravertebral soft tissues,   demonstrates peripheral enhancement.    L5-S1: Mild posterior disc bulge, facet arthropathy and ligamenta flava   thickening contribute to moderate right neural foraminal narrowing. No   significant central canal stenosis.    Retroaorticleft renal vein is incidentally noted.    IMPRESSION:  Large epidural fluid collection suggestive of an abscess in the lumbar   spine from lower L1 through mid L4 levels, with mass effect on the thecal   sac as described. Adjacent left paravertebral soft tissue thickening and   inflammatory signal.    Findings suggests discitis and osteomyelitis of L4-L5. Possible small   anterior epidural abscess at L4-L5 disc level.    Moderate right facet joint effusion at L4-L5 with extra-articular   extension the paravertebral soft tissues demonstrates peripheral   enhancement; an infection/abscess of the right L4-L5 facet joint is not   excluded.    < from: MR Thoracic Spine w/ IV Cont (18 @ 20:11) >  FINDINGS:   The thoracic cord demonstrates normal caliber and signal without a focus   of abnormal enhancement.    Thoracic alignment is maintained. There is no fracture or subluxation.   Vertebral body heights and bone marrow signal are preserved.  No abnormal   focus of enhancement is noted within the vertebral bodies.Intervertebral   disc spaces are preserved. There is no significant disc herniation,   central spinal canal stenosis or neuroforaminal narrowing in the thoracic   spine.    No organized fluid collection in the visualized paraspinal soft tissues.    IMPRESSION:  No MR evidence of discitis/osteomyelitis in the thoracic spine.    < from: Xray Chest 1 View AP/PA (18 @ 17:01) >  Poor inspirationcrowds the chest. The heart is magnified by technique.   The present film shows minimal density at the left lateral costophrenic   angle possibly atelectasis. This is new since 2016.    Otherwise no finding of note or change.    IMPRESSION: New minimal density left lateral costophrenic angle. Infectious Diseases Consult note  Patient is a 68y old  Female who presents with a chief complaint of Lumbar epidural abscess (2018 03:15)    KASH HALL  MRN-[id number]  HPI:  68 female with DM2, HTN, HLD - gastric ulcer transferred from [facility] 18 for management of left sided posterior epidural abscess extending from L1-L4. She initially presented with 2 weeks of back pain and fevers. Patient was unable to ambulate due to pain but had no focal weakness, no sensory loss, no bowel or bladder dysfunction. She was on vancomycin since  and cefepime  since . She had zosyn from  to . She had fevers at . WBC improved from 28 to 16. Her hospital course was complicated by an upper GI bleed requiring transfusions of 4U PRBC and 1U of platelets. No sick contacts. No recent travel. Urine culture grew a sensitive enterococcus. As per Neurosurgery - no surgical intervention.   as per primary team ct abdomen shows a psoas abscess.     REVIEW OF SYSTEMS  All as below unless noted otherwise    General:  Denies fever and chills. 	  Ophthalmologic: Denies any visual complaints. 	  ENMT: No throat pain.  Respiratory: No cough, sputum. No shortness of breath.   Cardiovascular: No chest pain.   Gastrointestinal: No nausea or vomiting. No abdominal pain or diarrhea.   Genitourinary: No burning urine, no frequency. No flank pain  Musculoskeletal: No joint swelling or pain.   Neurological: No confusion. 	  Skin: No rash    PAST MEDICAL & SURGICAL HISTORY:  Acute gastric ulcer with hemorrhage  Essential hypertension  Type 2 diabetes mellitus without complication  HLD (hyperlipidemia)  H/O tubal ligation    FAMILY HISTORY:  No pertinent family history in first degree relatives    SOCIAL HISTORY:  non smoker      ANTIMICROBIALS:    cefepime  IVPB 2000 every 8 hours  vancomycin  IVPB 1250 every 12 hours    OTHER MEDS:    acetaminophen   Tablet 650 milliGRAM(s) Oral every 6 hours PRN  acetaminophen   Tablet. 650 milliGRAM(s) Oral every 6 hours PRN  atorvastatin 40 milliGRAM(s) Oral at bedtime  docusate sodium 100 milliGRAM(s) Oral three times a day  insulin lispro (HumaLOG) corrective regimen sliding scale   SubCutaneous every 6 hours  lidocaine   Patch 1 Patch Transdermal daily  morphine  - Injectable 2 milliGRAM(s) IV Push every 4 hours PRN  oxyCODONE    IR 5 milliGRAM(s) Oral every 4 hours PRN  pantoprazole  Injectable 40 milliGRAM(s) IV Push two times a day  senna 2 Tablet(s) Oral at bedtime  sodium chloride 2 Gram(s) Oral three times a day  sodium chloride 0.9% with potassium chloride 20 mEq/L 1000 milliLiter(s) IV Continuous <Continuous>  sucralfate 1 Gram(s) Oral four times a day    Allergies  No Known Allergies    Vital Signs Last 24 Hrs  T(C): 37.2 (2018 10:07), Max: 37.9 (2018 01:24)  T(F): 98.9 (2018 10:07), Max: 100.2 (2018 01:24)  HR: 84 (2018 10:07) (84 - 99)  BP: 128/83 (2018 10:07) (120/74 - 153/84)  BP(mean): 86 (2018 04:00) (85 - 110)  RR: 17 (2018 10:07) (15 - 33)  SpO2: 98% (2018 10:07) (94% - 100%)  POCT Blood Glucose.: 139 mg/dL (2018 05:57)  Daily Height in cm: 134.62 (2018 04:48)    Daily Weight in k.3 (2018 01:24)      PHYSICAL EXAM:  patient in no acute respiratory distress.  Constitutional: Comfortable. Awake and alert  Eyes: PERRL EOMI. No discharge.  ENMT: No sinus tenderness.  No pharyngeal erythema.   Neck: Supple. No thyromegaly   Respiratory:  air entry bilaterally, no wheezes, rhonchi, or crackles  Cardiovascular:S1 S2 wnl, No murmurs  Gastrointestinal: Soft, no tenderness , bowel sounds present,   Genitourinary: No suprapubic tenderness. no CVA tenderness   Musculoskeletal: No joint swelling. No edema.  Vascular: peripheral pulses felt  Neurological: No grossly focal deficits  Skin: No rash   Lymph Nodes: No palpable Lymphadenopathy   Psychiatric: Affect normal  Lines:                           9.8    16.01 )-----------( 368      ( 2018 03:20 )             28.8     WBC Count: 16.01 ( @ 03:20)  WBC Count: 16.2 ( @ 17:06)  WBC Count: 16.3 ( @ 11:43)  WBC Count: 16.7 ( @ 03:55)  WBC Count: 16.9 ( @ 18:16)  WBC Count: 14.4 ( @ 13:50)  WBC Count: 18.0 ( @ 06:32)  WBC Count: 18.6 ( @ 01:47)  WBC Count: 20.2 ( @ 22:11)  WBC Count: 20.0 ( @ 12:06)  WBC Count: 18.7 ( @ 07:24)  WBC Count: 28.7 ( @ 18:45)  WBC Count: 23.4 ( @ 15:03)        125<L>  |  91<L>  |  8   ----------------------------<  151<H>  3.8   |  22  |  0.67    Ca    7.7<L>      2018 06:15    TPro  6.3  /  Alb  2.2<L>  /  TBili  0.8  /  DBili  x   /  AST  19  /  ALT  23  /  AlkPhos  98  04-08    Creatinine, Serum: 0.67 (-08)  Creatinine, Serum: 0.69 (-08)  Creatinine, Serum: 0.58 (-07)  Creatinine, Serum: 0.58 (-06)  Creatinine, Serum: 0.67 (-06)  Creatinine, Serum: 0.92 (-)  Creatinine, Serum: 0.80 (-)  Creatinine, Serum: 0.86 (-04)    PT/INR - ( 2018 03:20 )   PT: 13.3 SEC;   INR: 1.15     PTT - ( 2018 03:20 )  PTT:27.1 SEC    Urinalysis Basic - ( 2018 05:37 )    Color: PLYEL / Appearance: CLEAR / S.007 / pH: 7.0  Gluc: NEGATIVE / Ketone: TRACE  / Bili: NEGATIVE / Urobili: NORMAL mg/dL   Blood: NEGATIVE / Protein: NEGATIVE mg/dL / Nitrite: NEGATIVE   Leuk Esterase: SMALL / RBC: 0-2 / WBC 5-10   Sq Epi: OCC / Non Sq Epi: x / Bacteria: FEW    Lactate, Blood: 0.8 mmol/L (18 @ 18:32)  Sedimentation Rate, Erythrocyte: 95 mm/Hr (18 @ 18:45)  C-Reactive Protein, Serum: 16.50 mg/dL (18 @ 22:27)  Procalcitonin, Serum: 1.22 (18 @ 21:27)    MICROBIOLOGY:    Culture - Blood (collected 2018 22:50)  Source: .Blood Blood  Preliminary Report (2018 23:01):    No growth to date.    Culture - Blood (collected 2018 22:49)  Source: .Blood Blood  Preliminary Report (2018 23:01):    No growth to date.    Culture - Urine (18 @ 22:40)    -  Ampicillin: S 4    -  Ciprofloxacin: S <=1    -  Nitrofurantoin: S <=32    -  Tetra/Doxy: R >8    -  Vancomycin: S 2    Specimen Source: .Urine Clean Catch (Midstream)    Culture Results:   10,000 - 49,000 CFU/mL Enterococcus faecalis    Organism Identification: Enterococcus faecalis    Organism: Enterococcus faecalis    Method Type: KYLE          Vancomycin Level, Trough: 9.8 ug/mL (18 @ 17:09)  v      RADIOLOGY:    < from: MR Lumbar Spine w/ IV Cont (18 @ 20:12) >  FINDINGS:  There is a large left dorsal peripherally enhancing epidural collection   extending from approximately lower L1 through mid L4 levels, measuring   approximately 9.4 cm in CC dimension. It measures up to 1.6 x 0.9 cm in   TR by AP dimensions. There is mass effect on the thecal sac, which is   displaced anteriorly and to the right; the worse levels are at L2-L3 and   L3-L4, where this causes moderate to severe compression of the thecal sac   with near complete CSF effacement.    There is adjacent left paravertebral soft tissue thickening and   inflammatory signal, with mild lifting and thickening of the left psoas   muscle.    Mild diffuse hypointense T1 signal of the vertebral bodies is present.   There is mild abnormal T2/STIR hyperintensity of the L4 and L5 vertebral   bodies, with abnormal enhancement. Additionally, there is STIR   hyperintense signal of the L4-L5 intervertebral disc. These findings are   concerning for discitis/osteomyelitis of L4-L5.    Anterior to the L4-L5 disc and vertebral bodies, thereis a small focus   of T2 hyperintensity with a small apparent fluid collection/abscess   measuring 5 mm.    The conus medullaris is normal in signal and position. There is no   abnormal thickening or enhancement of the cauda equina nerve roots.    Mild dextroconvex curvature of the lumbar spine, with apex at L3.   Vertebral body heights are preserved. There is no fracture. There is   multilevel disc desiccation, with loss of height worse at L4-L5.    Findings by level;    T12-L1: No significant disc herniation, central canal stenosis or   neuroforaminal narrowing.    L1-L2: Mild diffuse disc bulge flattens the ventral thecal sac, without   intervening significant central canal stenosis or neural foraminal   narrowing.    L2-L3: The epidural collection, along with posterior disc bulge, and mild   facet arthropathy contributes to moderate-severe compression of the   thecal sac. No significant neuroforaminal narrowing.    L3-L4: The epidural collection, mild posterior disc bulge, mild facet   arthropathy with a small left facet joint effusion contributes to   moderate compression of the thecal sac. No significant neuroforaminal   narrowing.    L4-L5: Posterior disc osteophyte complex, facet arthropathy with moderate   right and trace left facet joint effusions and ligamenta flava thickening   contributes to mild central canal stenosis and moderate bilateral neural   foraminal narrowing. Moderate facet joint effusion extends   posterolaterally from the joints into the paravertebral soft tissues,   demonstrates peripheral enhancement.    L5-S1: Mild posterior disc bulge, facet arthropathy and ligamenta flava   thickening contribute to moderate right neural foraminal narrowing. No   significant central canal stenosis.    Retroaorticleft renal vein is incidentally noted.    IMPRESSION:  Large epidural fluid collection suggestive of an abscess in the lumbar   spine from lower L1 through mid L4 levels, with mass effect on the thecal   sac as described. Adjacent left paravertebral soft tissue thickening and   inflammatory signal.    Findings suggests discitis and osteomyelitis of L4-L5. Possible small   anterior epidural abscess at L4-L5 disc level.    Moderate right facet joint effusion at L4-L5 with extra-articular   extension the paravertebral soft tissues demonstrates peripheral   enhancement; an infection/abscess of the right L4-L5 facet joint is not   excluded.    < from: MR Thoracic Spine w/ IV Cont (18 @ 20:11) >  FINDINGS:   The thoracic cord demonstrates normal caliber and signal without a focus   of abnormal enhancement.    Thoracic alignment is maintained. There is no fracture or subluxation.   Vertebral body heights and bone marrow signal are preserved.  No abnormal   focus of enhancement is noted within the vertebral bodies.Intervertebral   disc spaces are preserved. There is no significant disc herniation,   central spinal canal stenosis or neuroforaminal narrowing in the thoracic   spine.    No organized fluid collection in the visualized paraspinal soft tissues.    IMPRESSION:  No MR evidence of discitis/osteomyelitis in the thoracic spine.    < from: Xray Chest 1 View AP/PA (18 @ 17:01) >  Poor inspirationcrowds the chest. The heart is magnified by technique.   The present film shows minimal density at the left lateral costophrenic   angle possibly atelectasis. This is new since 2016.    Otherwise no finding of note or change.    IMPRESSION: New minimal density left lateral costophrenic angle. Infectious Diseases Consult note  Patient is a 68y old  Female who presents with a chief complaint of Lumbar epidural abscess (2018 03:15)    KASH HALL  MRN-[id number]  HPI:  68 female with DM2, HTN, HLD - gastric ulcer transferred from [facility] 18 for management of left sided posterior epidural abscess extending from L1-L4. She initially presented with 2 weeks of back pain and fevers. Patient was unable to ambulate due to pain but had no focal weakness, no sensory loss, no bowel or bladder dysfunction. She was on vancomycin since  and cefepime  since . She had zosyn from  to . She had fevers at . WBC improved from 28 to 16. Her hospital course was complicated by an upper GI bleed requiring transfusions of 4U PRBC and 1U of platelets. No sick contacts. No recent travel. Urine culture grew a sensitive enterococcus. As per Neurosurgery - no surgical intervention.   as per primary team ct abdomen shows a psoas abscess.     REVIEW OF SYSTEMS  All as below unless noted otherwise    General:  Denies fever and chills. 	  Ophthalmologic: Denies any visual complaints. 	  ENMT: No throat pain.  Respiratory: No cough, sputum. No shortness of breath.   Cardiovascular: No chest pain.   Gastrointestinal: No nausea or vomiting. + abdominal pain or diarrhea.   Genitourinary: No burning urine, no frequency. No flank pain  Musculoskeletal: + back pain   Neurological: No confusion. 	  Skin: No rash  psych: no anxiety or depression    PAST MEDICAL & SURGICAL HISTORY:  Acute gastric ulcer with hemorrhage  Essential hypertension  Type 2 diabetes mellitus without complication  HLD (hyperlipidemia)  H/O tubal ligation    FAMILY HISTORY:  reviewed, No pertinent family history in first degree relatives    SOCIAL HISTORY:  from [place], came to the  [place] years ago, denied smoking, alcohol or drug abuse     ANTIMICROBIALS:    cefepime  IVPB 2000 every 8 hours  vancomycin  IVPB 1250 every 12 hours    OTHER MEDS:    acetaminophen   Tablet 650 milliGRAM(s) Oral every 6 hours PRN  acetaminophen   Tablet. 650 milliGRAM(s) Oral every 6 hours PRN  atorvastatin 40 milliGRAM(s) Oral at bedtime  docusate sodium 100 milliGRAM(s) Oral three times a day  insulin lispro (HumaLOG) corrective regimen sliding scale   SubCutaneous every 6 hours  lidocaine   Patch 1 Patch Transdermal daily  morphine  - Injectable 2 milliGRAM(s) IV Push every 4 hours PRN  oxyCODONE    IR 5 milliGRAM(s) Oral every 4 hours PRN  pantoprazole  Injectable 40 milliGRAM(s) IV Push two times a day  senna 2 Tablet(s) Oral at bedtime  sodium chloride 2 Gram(s) Oral three times a day  sodium chloride 0.9% with potassium chloride 20 mEq/L 1000 milliLiter(s) IV Continuous <Continuous>  sucralfate 1 Gram(s) Oral four times a day    Allergies  No Known Allergies    Vital Signs Last 24 Hrs  T(C): 37.2 (2018 10:07), Max: 37.9 (2018 01:24)  T(F): 98.9 (2018 10:07), Max: 100.2 (2018 01:24)  HR: 84 (2018 10:07) (84 - 99)  BP: 128/83 (2018 10:07) (120/74 - 153/84)  BP(mean): 86 (2018 04:00) (85 - 110)  RR: 17 (2018 10:07) (15 - 33)  SpO2: 98% (2018 10:07) (94% - 100%)  POCT Blood Glucose.: 139 mg/dL (2018 05:57)  Daily Height in cm: 134.62 (2018 04:48)    Daily Weight in k.3 (2018 01:24)      PHYSICAL EXAM:  patient in no acute respiratory distress.  Constitutional: Comfortable. Awake and alert  Eyes: PERRL EOMI. No discharge.  ENMT: No sinus tenderness.  No pharyngeal erythema.   Neck: Supple. No thyromegaly   Respiratory:  air entry bilaterally, no wheezes, rhonchi, or crackles  Cardiovascular:S1 S2 wnl, No murmurs  Gastrointestinal: Soft, slight Left side tenderness , bowel sounds present,   Genitourinary: No suprapubic tenderness. no CVA tenderness   Musculoskeletal: lumbar tenderness, No joint swelling. No edema.  Vascular: peripheral pulses felt  Neurological: No grossly focal deficits  Skin: No rash   Lymph Nodes: No palpable Lymphadenopathy   Psychiatric: Affect normal  Lines: no central lines, just peripheral                          9.8    16.01 )-----------( 368      ( 2018 03:20 )             28.8     WBC Count: 16.01 ( @ 03:20)  WBC Count: 16.2 ( @ 17:06)  WBC Count: 16.3 ( @ 11:43)  WBC Count: 16.7 ( @ 03:55)  WBC Count: 16.9 ( @ 18:16)  WBC Count: 14.4 ( @ 13:50)  WBC Count: 18.0 ( @ 06:32)  WBC Count: 18.6 ( @ 01:47)  WBC Count: 20.2 ( @ 22:11)  WBC Count: 20.0 ( @ 12:06)  WBC Count: 18.7 ( @ 07:24)  WBC Count: 28.7 ( @ 18:45)  WBC Count: 23.4 ( @ 15:03)        125<L>  |  91<L>  |  8   ----------------------------<  151<H>  3.8   |  22  |  0.67    Ca    7.7<L>      2018 06:15    TPro  6.3  /  Alb  2.2<L>  /  TBili  0.8  /  DBili  x   /  AST  19  /  ALT  23  /  AlkPhos  98  -08    Creatinine, Serum: 0.67 (08)  Creatinine, Serum: 0.69 (08)  Creatinine, Serum: 0.58 ()  Creatinine, Serum: 0.58 ()  Creatinine, Serum: 0.67 ()  Creatinine, Serum: 0.92 ()  Creatinine, Serum: 0.80 (-)  Creatinine, Serum: 0.86 (-04)    PT/INR - ( 2018 03:20 )   PT: 13.3 SEC;   INR: 1.15     PTT - ( 2018 03:20 )  PTT:27.1 SEC    Urinalysis Basic - ( 2018 05:37 )    Color: PLYEL / Appearance: CLEAR / S.007 / pH: 7.0  Gluc: NEGATIVE / Ketone: TRACE  / Bili: NEGATIVE / Urobili: NORMAL mg/dL   Blood: NEGATIVE / Protein: NEGATIVE mg/dL / Nitrite: NEGATIVE   Leuk Esterase: SMALL / RBC: 0-2 / WBC 5-10   Sq Epi: OCC / Non Sq Epi: x / Bacteria: FEW    Lactate, Blood: 0.8 mmol/L (18 @ 18:32)  Sedimentation Rate, Erythrocyte: 95 mm/Hr (18 @ 18:45)  C-Reactive Protein, Serum: 16.50 mg/dL (18 @ 22:27)  Procalcitonin, Serum: 1.22 (18 @ 21:27)    MICROBIOLOGY:    Culture - Blood (collected 2018 22:50)  Source: .Blood Blood  Preliminary Report (2018 23:01):    No growth to date.    Culture - Blood (collected 2018 22:49)  Source: .Blood Blood  Preliminary Report (2018 23:01):    No growth to date.    Culture - Urine (18 @ 22:40)    -  Ampicillin: S 4    -  Ciprofloxacin: S <=1    -  Nitrofurantoin: S <=32    -  Tetra/Doxy: R >8    -  Vancomycin: S 2    Specimen Source: .Urine Clean Catch (Midstream)    Culture Results:   10,000 - 49,000 CFU/mL Enterococcus faecalis    Organism Identification: Enterococcus faecalis    Organism: Enterococcus faecalis    Method Type: KYLE          Vancomycin Level, Trough: 9.8 ug/mL (18 @ 17:09)  v      RADIOLOGY:    < from: MR Lumbar Spine w/ IV Cont (18 @ 20:12) >  IMPRESSION:  Large epidural fluid collection suggestive of an abscess in the lumbar   spine from lower L1 through mid L4 levels, with mass effect on the thecal   sac as described. Adjacent left paravertebral soft tissue thickening and   inflammatory signal.    Findings suggests discitis and osteomyelitis of L4-L5. Possible small   anterior epidural abscess at L4-L5 disc level.    Moderate right facet joint effusion at L4-L5 with extra-articular   extension the paravertebral soft tissues demonstrates peripheral   enhancement; an infection/abscess of the right L4-L5 facet joint is not   excluded.    < from: MR Thoracic Spine w/ IV Cont (18 @ 20:11) >  FINDINGS:   TIMPRESSION:  No MR evidence of discitis/osteomyelitis in the thoracic spine.      < from: CT Abdomen and Pelvis w/ IV Cont (18 @ 12:00) >  IMPRESSION:   Multiloculated peripherally enhancing fluid collection tracking down the   left psoas muscle, likely an abscess.    Thrombus within the infrarenal IVC and bilateral common iliac veins.    Limited evaluation of the stomach secondary to underdistention/ lack of   oral contrast.     8 mm right outer breast nodule, for which correlation with mammography is   recommended.

## 2018-04-08 NOTE — H&P ADULT - HISTORY OF PRESENT ILLNESS
67 YO female was admitted to [facility] 4/5/18 with 2 weeks of back pain and fevers. Patient was unable to ambulate due to pain but had no focal weakness, no sensory loss, no bowel or bladder dysfunction. MRI of the lumbar spine revealed a left sided posterior epidural abscess extending from L1-L4. Her hospital course was complicated by an upper GI bleed requiring transfusions of 4U PRBC and 1U of platelets.

## 2018-04-09 DIAGNOSIS — I82.413 ACUTE EMBOLISM AND THROMBOSIS OF FEMORAL VEIN, BILATERAL: ICD-10-CM

## 2018-04-09 DIAGNOSIS — K68.12 PSOAS MUSCLE ABSCESS: ICD-10-CM

## 2018-04-09 LAB
% ALBUMIN: 34.1 % — SIGNIFICANT CHANGE UP
% ALBUMIN: 34.2 % — SIGNIFICANT CHANGE UP
% ALPHA 1: 11.6 % — SIGNIFICANT CHANGE UP
% ALPHA 1: 11.7 % — SIGNIFICANT CHANGE UP
% ALPHA 2: 12.9 % — SIGNIFICANT CHANGE UP
% ALPHA 2: 13 % — SIGNIFICANT CHANGE UP
% BETA: 11.9 % — SIGNIFICANT CHANGE UP
% BETA: 12.1 % — SIGNIFICANT CHANGE UP
% GAMMA: 29.1 % — SIGNIFICANT CHANGE UP
% GAMMA: 29.4 % — SIGNIFICANT CHANGE UP
% M SPIKE: 7.4 % — SIGNIFICANT CHANGE UP
% M SPIKE: 7.4 % — SIGNIFICANT CHANGE UP
-  CANDIDA ALBICANS: SIGNIFICANT CHANGE UP
-  CANDIDA GLABRATA: SIGNIFICANT CHANGE UP
-  CANDIDA KRUSEI: SIGNIFICANT CHANGE UP
-  CANDIDA PARAPSILOSIS: SIGNIFICANT CHANGE UP
-  CANDIDA TROPICALIS: SIGNIFICANT CHANGE UP
-  COAGULASE NEGATIVE STAPHYLOCOCCUS: SIGNIFICANT CHANGE UP
-  K. PNEUMONIAE GROUP: SIGNIFICANT CHANGE UP
-  KPC RESISTANCE GENE: SIGNIFICANT CHANGE UP
-  STREPTOCOCCUS SP. (NOT GRP A, B OR S PNEUMONIAE): SIGNIFICANT CHANGE UP
A BAUMANNII DNA SPEC QL NAA+PROBE: SIGNIFICANT CHANGE UP
ALBUMIN SERPL ELPH-MCNC: 1.7 G/DL — LOW (ref 3.6–5.5)
ALBUMIN SERPL ELPH-MCNC: 1.8 G/DL — LOW (ref 3.6–5.5)
ALBUMIN/GLOB SERPL ELPH: 0.5 RATIO — SIGNIFICANT CHANGE UP
ALBUMIN/GLOB SERPL ELPH: 0.5 RATIO — SIGNIFICANT CHANGE UP
ALPHA1 GLOB SERPL ELPH-MCNC: 0.6 G/DL — HIGH (ref 0.1–0.4)
ALPHA1 GLOB SERPL ELPH-MCNC: 0.6 G/DL — HIGH (ref 0.1–0.4)
ALPHA2 GLOB SERPL ELPH-MCNC: 0.7 G/DL — SIGNIFICANT CHANGE UP (ref 0.5–1)
ALPHA2 GLOB SERPL ELPH-MCNC: 0.7 G/DL — SIGNIFICANT CHANGE UP (ref 0.5–1)
B-GLOBULIN SERPL ELPH-MCNC: 0.6 G/DL — SIGNIFICANT CHANGE UP (ref 0.5–1)
B-GLOBULIN SERPL ELPH-MCNC: 0.6 G/DL — SIGNIFICANT CHANGE UP (ref 0.5–1)
BUN SERPL-MCNC: 10 MG/DL — SIGNIFICANT CHANGE UP (ref 7–23)
CALCIUM SERPL-MCNC: 7.4 MG/DL — LOW (ref 8.4–10.5)
CHLORIDE SERPL-SCNC: 92 MMOL/L — LOW (ref 98–107)
CO2 SERPL-SCNC: 19 MMOL/L — LOW (ref 22–31)
CREAT SERPL-MCNC: 0.69 MG/DL — SIGNIFICANT CHANGE UP (ref 0.5–1.3)
CULTURE RESULTS: SIGNIFICANT CHANGE UP
E CLOAC COMP DNA BLD POS QL NAA+PROBE: SIGNIFICANT CHANGE UP
E COLI DNA BLD POS QL NAA+NON-PROBE: SIGNIFICANT CHANGE UP
ENTEROCOC DNA BLD POS QL NAA+NON-PROBE: SIGNIFICANT CHANGE UP
ENTEROCOC DNA BLD POS QL NAA+NON-PROBE: SIGNIFICANT CHANGE UP
GAMMA GLOBULIN: 1.5 G/DL — SIGNIFICANT CHANGE UP (ref 0.6–1.6)
GAMMA GLOBULIN: 1.5 G/DL — SIGNIFICANT CHANGE UP (ref 0.6–1.6)
GLUCOSE SERPL-MCNC: 117 MG/DL — HIGH (ref 70–99)
GP B STREP DNA BLD POS QL NAA+NON-PROBE: SIGNIFICANT CHANGE UP
GRAM STN FLD: SIGNIFICANT CHANGE UP
HAEM INFLU DNA BLD POS QL NAA+NON-PROBE: SIGNIFICANT CHANGE UP
HCT VFR BLD CALC: 27.3 % — LOW (ref 34.5–45)
HGB BLD-MCNC: 9.2 G/DL — LOW (ref 11.5–15.5)
INTERPRETATION SERPL IFE-IMP: SIGNIFICANT CHANGE UP
K OXYTOCA DNA BLD POS QL NAA+NON-PROBE: SIGNIFICANT CHANGE UP
L MONOCYTOG DNA BLD POS QL NAA+NON-PROBE: SIGNIFICANT CHANGE UP
M-SPIKE: 0.4 G/DL — HIGH (ref 0–0)
M-SPIKE: 0.4 G/DL — HIGH (ref 0–0)
MCHC RBC-ENTMCNC: 29.7 PG — SIGNIFICANT CHANGE UP (ref 27–34)
MCHC RBC-ENTMCNC: 33.7 % — SIGNIFICANT CHANGE UP (ref 32–36)
MCV RBC AUTO: 88.1 FL — SIGNIFICANT CHANGE UP (ref 80–100)
METHOD TYPE: SIGNIFICANT CHANGE UP
MRSA SPEC QL CULT: SIGNIFICANT CHANGE UP
MSSA DNA SPEC QL NAA+PROBE: SIGNIFICANT CHANGE UP
N MEN ISLT CULT: SIGNIFICANT CHANGE UP
NRBC # FLD: 0 — SIGNIFICANT CHANGE UP
OB PNL STL: POSITIVE — SIGNIFICANT CHANGE UP
ORGANISM # SPEC MICROSCOPIC CNT: SIGNIFICANT CHANGE UP
ORGANISM # SPEC MICROSCOPIC CNT: SIGNIFICANT CHANGE UP
P AERUGINOSA DNA BLD POS NAA+NON-PROBE: SIGNIFICANT CHANGE UP
PLATELET # BLD AUTO: 336 K/UL — SIGNIFICANT CHANGE UP (ref 150–400)
PMV BLD: 9.2 FL — SIGNIFICANT CHANGE UP (ref 7–13)
POTASSIUM SERPL-MCNC: 3.8 MMOL/L — SIGNIFICANT CHANGE UP (ref 3.5–5.3)
POTASSIUM SERPL-SCNC: 3.8 MMOL/L — SIGNIFICANT CHANGE UP (ref 3.5–5.3)
PROT PATTERN SERPL ELPH-IMP: SIGNIFICANT CHANGE UP
PROT PATTERN SERPL ELPH-IMP: SIGNIFICANT CHANGE UP
RBC # BLD: 3.1 M/UL — LOW (ref 3.8–5.2)
RBC # FLD: 15.9 % — HIGH (ref 10.3–14.5)
S MARCESCENS DNA BLD POS NAA+NON-PROBE: SIGNIFICANT CHANGE UP
S PNEUM DNA BLD POS QL NAA+NON-PROBE: SIGNIFICANT CHANGE UP
S PYO DNA BLD POS QL NAA+NON-PROBE: SIGNIFICANT CHANGE UP
SODIUM SERPL-SCNC: 126 MMOL/L — LOW (ref 135–145)
SPECIMEN SOURCE: SIGNIFICANT CHANGE UP
SPECIMEN SOURCE: SIGNIFICANT CHANGE UP
TM INTERPRETATION: SIGNIFICANT CHANGE UP
VANCOMYCIN TROUGH SERPL-MCNC: 13.9 UG/ML — SIGNIFICANT CHANGE UP (ref 10–20)
WBC # BLD: 12.08 K/UL — HIGH (ref 3.8–10.5)
WBC # FLD AUTO: 12.08 K/UL — HIGH (ref 3.8–10.5)

## 2018-04-09 PROCEDURE — 99233 SBSQ HOSP IP/OBS HIGH 50: CPT | Mod: GC

## 2018-04-09 PROCEDURE — 71270 CT THORAX DX C-/C+: CPT | Mod: 26

## 2018-04-09 PROCEDURE — 99232 SBSQ HOSP IP/OBS MODERATE 35: CPT

## 2018-04-09 PROCEDURE — 99233 SBSQ HOSP IP/OBS HIGH 50: CPT

## 2018-04-09 PROCEDURE — 99222 1ST HOSP IP/OBS MODERATE 55: CPT | Mod: GC

## 2018-04-09 RX ORDER — ONDANSETRON 8 MG/1
4 TABLET, FILM COATED ORAL EVERY 6 HOURS
Qty: 0 | Refills: 0 | Status: DISCONTINUED | OUTPATIENT
Start: 2018-04-09 | End: 2018-04-22

## 2018-04-09 RX ADMIN — LIDOCAINE 1 PATCH: 4 CREAM TOPICAL at 01:12

## 2018-04-09 RX ADMIN — PANTOPRAZOLE SODIUM 40 MILLIGRAM(S): 20 TABLET, DELAYED RELEASE ORAL at 05:39

## 2018-04-09 RX ADMIN — Medication 166.67 MILLIGRAM(S): at 18:37

## 2018-04-09 RX ADMIN — LIDOCAINE 1 PATCH: 4 CREAM TOPICAL at 23:07

## 2018-04-09 RX ADMIN — CEFEPIME 100 MILLIGRAM(S): 1 INJECTION, POWDER, FOR SOLUTION INTRAMUSCULAR; INTRAVENOUS at 22:45

## 2018-04-09 RX ADMIN — ONDANSETRON 4 MILLIGRAM(S): 8 TABLET, FILM COATED ORAL at 05:59

## 2018-04-09 RX ADMIN — Medication 1 GRAM(S): at 23:07

## 2018-04-09 RX ADMIN — Medication 1 GRAM(S): at 05:37

## 2018-04-09 RX ADMIN — CEFEPIME 100 MILLIGRAM(S): 1 INJECTION, POWDER, FOR SOLUTION INTRAMUSCULAR; INTRAVENOUS at 13:14

## 2018-04-09 RX ADMIN — SODIUM CHLORIDE 2 GRAM(S): 9 INJECTION INTRAMUSCULAR; INTRAVENOUS; SUBCUTANEOUS at 22:45

## 2018-04-09 RX ADMIN — SODIUM CHLORIDE 2 GRAM(S): 9 INJECTION INTRAMUSCULAR; INTRAVENOUS; SUBCUTANEOUS at 05:37

## 2018-04-09 RX ADMIN — LIDOCAINE 1 PATCH: 4 CREAM TOPICAL at 11:43

## 2018-04-09 RX ADMIN — PANTOPRAZOLE SODIUM 40 MILLIGRAM(S): 20 TABLET, DELAYED RELEASE ORAL at 18:37

## 2018-04-09 RX ADMIN — ATORVASTATIN CALCIUM 40 MILLIGRAM(S): 80 TABLET, FILM COATED ORAL at 22:45

## 2018-04-09 RX ADMIN — CEFEPIME 100 MILLIGRAM(S): 1 INJECTION, POWDER, FOR SOLUTION INTRAMUSCULAR; INTRAVENOUS at 05:41

## 2018-04-09 RX ADMIN — SODIUM CHLORIDE 2 GRAM(S): 9 INJECTION INTRAMUSCULAR; INTRAVENOUS; SUBCUTANEOUS at 13:14

## 2018-04-09 RX ADMIN — Medication 166.67 MILLIGRAM(S): at 05:42

## 2018-04-09 RX ADMIN — Medication 1 GRAM(S): at 18:37

## 2018-04-09 RX ADMIN — Medication 1 GRAM(S): at 11:43

## 2018-04-09 NOTE — PROGRESS NOTE ADULT - PROBLEM SELECTOR PLAN 5
initially thought to be secondary to HCTZ improved off HCTZ pt reports decreased PO intake past few days  Currently on salt tabs  Check BMP today

## 2018-04-09 NOTE — PROVIDER CONTACT NOTE (CRITICAL VALUE NOTIFICATION) - SITUATION
Blood cultures from 04/06 that were drawn at [facility] in Aerobic gram variable positive cocci bacili

## 2018-04-09 NOTE — PROGRESS NOTE ADULT - ASSESSMENT
68 f with DM2, HTN, HLD - gastric ulcer presented to [facility] 4/5/18 for fever and back pain, was found to have L1-L4 left sided posterior epidural abscess with discitis and osteomyelitis.   urine cx: E. faecalis  pt received vanco and zosyn then switched to Imipenem as patient was febrile, then transferred to [facility] for abscess drainage, here abd CT showed also psoas abscess with extensive DVT in b/l iliac and infrarenal IVC  pt is from , denied any TB exposure    Left Epidural L1-L4 and psoas abscess with osteomyelitis and discitis, blood cx with GV coccobacili   extensive DVT in b/l iliac veins and infrarenal IVC  leukocytosis    going for IR abscess drainage, psoas or epidural drainage, cultures for AFB, fungal and regular cx  f/u brucella serology  check HIV  repeat blood cultures  continue Vancomycin 1 g q 12  monitor Vancomycin trough, check tomorrow  continue Cefepime 2 g q8

## 2018-04-09 NOTE — PROGRESS NOTE ADULT - PROBLEM SELECTOR PLAN 7
CT A/P done-showed thrombus in infrarenal IVC and b/l common illiac veins  -hold AC, pednign suprarenal IVC filter  vascular consult appreciated

## 2018-04-09 NOTE — PROGRESS NOTE ADULT - SUBJECTIVE AND OBJECTIVE BOX
No issues overnight  Consultations by GI, general surgery, vascular surgery, ID, and medical hospitalist noted  Vital Signs Last 24 Hrs  T(C): 36.9 (09 Apr 2018 01:10), Max: 37.4 (08 Apr 2018 14:27)  T(F): 98.4 (09 Apr 2018 01:10), Max: 99.3 (08 Apr 2018 14:27)  HR: 98 (09 Apr 2018 01:10) (84 - 98)  BP: 127/85 (09 Apr 2018 01:10) (120/74 - 143/99)  BP(mean): 86 (08 Apr 2018 04:00) (86 - 109)  RR: 18 (09 Apr 2018 01:10) (17 - 23)  SpO2: 96% (09 Apr 2018 01:10) (96% - 99%)    Awake, alert  YOO strength 5/5  SILT    MEDICATIONS  (STANDING):  atorvastatin 40 milliGRAM(s) Oral at bedtime  cefepime  IVPB 2000 milliGRAM(s) IV Intermittent every 8 hours  dextrose 5%. 1000 milliLiter(s) (50 mL/Hr) IV Continuous <Continuous>  dextrose 50% Injectable 12.5 Gram(s) IV Push once  dextrose 50% Injectable 25 Gram(s) IV Push once  dextrose 50% Injectable 25 Gram(s) IV Push once  docusate sodium 100 milliGRAM(s) Oral three times a day  insulin lispro (HumaLOG) corrective regimen sliding scale   SubCutaneous three times a day before meals  insulin lispro (HumaLOG) corrective regimen sliding scale   SubCutaneous at bedtime  lidocaine   Patch 1 Patch Transdermal daily  pantoprazole  Injectable 40 milliGRAM(s) IV Push two times a day  senna 2 Tablet(s) Oral at bedtime  sodium chloride 2 Gram(s) Oral three times a day  sucralfate 1 Gram(s) Oral four times a day  vancomycin  IVPB 1250 milliGRAM(s) IV Intermittent every 12 hours    MEDICATIONS  (PRN):  acetaminophen   Tablet 650 milliGRAM(s) Oral every 6 hours PRN For Temp greater than 38 C (100.4 F)  acetaminophen   Tablet. 650 milliGRAM(s) Oral every 6 hours PRN Mild Pain (1 - 3)  dextrose Gel 1 Dose(s) Oral once PRN Blood Glucose LESS THAN 70 milliGRAM(s)/deciliter  glucagon  Injectable 1 milliGRAM(s) IntraMuscular once PRN Glucose LESS THAN 70 milligrams/deciliter  morphine  - Injectable 2 milliGRAM(s) IV Push every 4 hours PRN Severe Pain (7 - 10)  oxyCODONE    IR 5 milliGRAM(s) Oral every 4 hours PRN Moderate Pain (4 - 6)                          9.8    16.01 )-----------( 368      ( 08 Apr 2018 03:20 )             28.8   04-08    125<L>  |  91<L>  |  8   ----------------------------<  151<H>  3.8   |  22  |  0.67    Ca    7.7<L>      08 Apr 2018 06:15    TPro  6.3  /  Alb  2.2<L>  /  TBili  0.8  /  DBili  x   /  AST  19  /  ALT  23  /  AlkPhos  98  04-08    < from: CT Abdomen and Pelvis w/ IV Cont (04.08.18 @ 12:00) >  LOWER CHEST: Trace bilateral pleural effusions, left greater than right,   associated with compressive atelectasis. Coronary artery calcifications.   Aortic valvular and mitral annular calcifications. Mild cardiomegaly. 8   mm right outer breast nodule (2:6).    LIVER: Right hepatic cyst and a few subcentimeter hypodensities too small   to characterize.  BILE DUCTS: Normal caliber.  GALLBLADDER: Within normal limits.  SPLEEN: Within normal limits.  PANCREAS: Within normal limits.  ADRENALS: Within normal limits.  KIDNEYS/URETERS: Subcentimeter hypodensity in the left upper renal pole   too small to characterize. No hydronephrosis.    BLADDER: Within normal limits.  REPRODUCTIVE ORGANS: Endometrium measures up to 5 mm. No adnexal masses.    BOWEL: Limited evaluation of the stomach secondary to lack of oral   contrast. No bowel obstruction. Appendix is normal.  PERITONEUM: No ascites.  VESSELS:  Filling defect within the infrarenal IVC just above the   bifurcation and filling defects within the bilateral common iliac veins,   compatible with thrombus. Atherosclerotic calcifications of the aorta and   its branches. Retroaortic left renal vein. Replaced right hepatic artery   from the SMA.  RETROPERITONEUM: No lymphadenopathy.    ABDOMINAL WALL: Small fat-containing umbilical hernia containing   nonobstructed small bowel. Multiloculated peripherally enhancing fluid   collection tracking along the medial aspect of the left psoas muscle   measuring 2.6 x 1.3 x 10.0 cm. Subcutaneous edema. Fluid in the presacral   space.  BONES: Mild bilateral sacroiliac joint arthrosis. Spinal degenerative   changes.    IMPRESSION:   Multiloculated peripherally enhancing fluid collection tracking down the   left psoas muscle, likely an abscess.    Thrombus within the infrarenal IVC and bilateral common iliac veins.    Limited evaluation of the stomach secondary to underdistention/ lack of   oral contrast.     8 mm right outer breast nodule, for which correlation with mammography is   recommended.    < end of copied text >

## 2018-04-09 NOTE — PROGRESS NOTE ADULT - SUBJECTIVE AND OBJECTIVE BOX
Follow Up:  epidural abscess, psoas abscess, spine discitis and ostemyelitis    Interval History: pt afebrile now, WBC improving, awaiting IR drainage    ROS:      All other systems negative    Constitutional: no fever, no chills  Head: no trauma  Eyes: no vision changes, no eye pain  ENT:  no vision changes, no sore throat, no rhinorrhea  Cardiovascular:  no chest pain, no palpitation  Respiratory:  no SOB, no cough  GI:  + abd pain, no vomiting, no diarrhea  urinary: no dysuria, no hematuria, no flank pain  musculoskeletal:  + back pain  skin:  no rash  neurology:  no headache, no seizure, no change in mental status  psych: no anxiety, no depression         Allergies  No Known Allergies        ANTIMICROBIALS:  cefepime  IVPB 2000 every 8 hours  vancomycin  IVPB 1250 every 12 hours      OTHER MEDS:  acetaminophen   Tablet 650 milliGRAM(s) Oral every 6 hours PRN  acetaminophen   Tablet. 650 milliGRAM(s) Oral every 6 hours PRN  atorvastatin 40 milliGRAM(s) Oral at bedtime  dextrose 5%. 1000 milliLiter(s) IV Continuous <Continuous>  dextrose 50% Injectable 12.5 Gram(s) IV Push once  dextrose 50% Injectable 25 Gram(s) IV Push once  dextrose 50% Injectable 25 Gram(s) IV Push once  dextrose Gel 1 Dose(s) Oral once PRN  docusate sodium 100 milliGRAM(s) Oral three times a day  glucagon  Injectable 1 milliGRAM(s) IntraMuscular once PRN  insulin lispro (HumaLOG) corrective regimen sliding scale   SubCutaneous three times a day before meals  insulin lispro (HumaLOG) corrective regimen sliding scale   SubCutaneous at bedtime  lidocaine   Patch 1 Patch Transdermal daily  morphine  - Injectable 2 milliGRAM(s) IV Push every 4 hours PRN  ondansetron Injectable 4 milliGRAM(s) IV Push every 6 hours PRN  oxyCODONE    IR 5 milliGRAM(s) Oral every 4 hours PRN  pantoprazole  Injectable 40 milliGRAM(s) IV Push two times a day  senna 2 Tablet(s) Oral at bedtime  sodium chloride 2 Gram(s) Oral three times a day  sucralfate 1 Gram(s) Oral four times a day      Vital Signs Last 24 Hrs  T(C): 36.7 (2018 09:14), Max: 37.4 (2018 14:27)  T(F): 98.1 (2018 09:14), Max: 99.3 (2018 14:27)  HR: 80 (2018 09:14) (80 - 98)  BP: 133/91 (2018 09:14) (121/82 - 133/91)  BP(mean): --  RR: 17 (2018 09:14) (17 - 20)  SpO2: 94% (2018 09:14) (94% - 99%)    Physical Exam:  General:    NAD,  non toxic  Head: atraumatic, normocephalic  Eye: normal sclera and conjunctiva  ENT:    no oropharyngeal lesions,   no LAD,   neck supple  Cardio:     regular S1, S2,  no murmur  Respiratory:    clear b/l,    no wheezing  abd:     soft,   BS +,   diffuse left sided tenderness  :   no CVAT,  no suprapubic tenderness,   no  mcclendon  Musculoskeletal:   spine tenderness on the lower back  vascular: no lines, normal pulses  Skin:    no rash  Neurologic:     no focal deficit  psych: normal affect, no suicidal ideation                          9.2    12.08 )-----------( 336      ( 2018 05:35 )             27.3       0408    125<L>  |  91<L>  |  8   ----------------------------<  151<H>  3.8   |  22  |  0.67    Ca    7.7<L>      2018 06:15    TPro  6.3  /  Alb  2.2<L>  /  TBili  0.8  /  DBili  x   /  AST  19  /  ALT  23  /  AlkPhos  98  0408      Urinalysis Basic - ( 2018 05:37 )    Color: PLYEL / Appearance: CLEAR / S.007 / pH: 7.0  Gluc: NEGATIVE / Ketone: TRACE  / Bili: NEGATIVE / Urobili: NORMAL mg/dL   Blood: NEGATIVE / Protein: NEGATIVE mg/dL / Nitrite: NEGATIVE   Leuk Esterase: SMALL / RBC: 0-2 / WBC 5-10   Sq Epi: OCC / Non Sq Epi: x / Bacteria: FEW        MICROBIOLOGY:  v  .Blood Blood  18   No growth to date.  --  --      .Blood Blood  18   Growth in aerobic bottle:  Gram Variable CoccoBacilli  "    .Blood Blood  18   No growth to date.  --  --      .Urine Clean Catch (Midstream)  18   10,000 - 49,000 CFU/mL Enterococcus faecalis  --  Enterococcus faecalis      .Urine Clean Catch (Midstream)  18   No growth  --  --                RADIOLOGY:    < from: CT Chest w/wo IV Cont (18 @ 09:30) >  IMPRESSION: Small left pleural effusion with bilateral lower lobe areas   of dependent atelectasis.    2 tiny bilateral nonspecific pulmonary nodules as above. Follow-up can be   performed for further evaluation.    8 mm nodule within the peripheral aspect of the right breast as above.

## 2018-04-09 NOTE — PROGRESS NOTE ADULT - PROBLEM SELECTOR PLAN 2
having black stools, no bright red blood per rectum  s/p 4 U prbc with appropriate rise , currently HD stable  GI following  PPI IV BID  -f/u path of prior EGD.

## 2018-04-09 NOTE — PROGRESS NOTE ADULT - SUBJECTIVE AND OBJECTIVE BOX
Vital Signs Last 24 Hrs  T(C): 36.8 (09 Apr 2018 05:35), Max: 37.4 (08 Apr 2018 14:27)  T(F): 98.3 (09 Apr 2018 05:35), Max: 99.3 (08 Apr 2018 14:27)  HR: 96 (09 Apr 2018 05:35) (84 - 98)  BP: 126/80 (09 Apr 2018 05:35) (121/82 - 131/81)  BP(mean): --  RR: 17 (09 Apr 2018 05:35) (17 - 20)  SpO2: 95% (09 Apr 2018 05:35) (95% - 99%)    I&O's Detail    08 Apr 2018 07:01  -  09 Apr 2018 07:00  --------------------------------------------------------  IN:  Total IN: 0 mL    OUT:    Voided: 750 mL  Total OUT: 750 mL    Total NET: -750 mL                                9.2    12.08 )-----------( 336      ( 09 Apr 2018 05:35 )             27.3       04-08    125<L>  |  91<L>  |  8   ----------------------------<  151<H>  3.8   |  22  |  0.67    Ca    7.7<L>      08 Apr 2018 06:15    TPro  6.3  /  Alb  2.2<L>  /  TBili  0.8  /  DBili  x   /  AST  19  /  ALT  23  /  AlkPhos  98  04-08      PT/INR - ( 08 Apr 2018 03:20 )   PT: 13.3 SEC;   INR: 1.15          PTT - ( 08 Apr 2018 03:20 )  PTT:27.1 SEC    abdomen non-tender    PLAN:  IR drainage of epidural abscess  IR placement of vena cava filter  Check pathology of gastric ulcer    The epidural abscess will take priority at this point unless the Gastric ulcer starts major hemorrhaging

## 2018-04-09 NOTE — PROGRESS NOTE ADULT - SUBJECTIVE AND OBJECTIVE BOX
Patient is a 68y old  Female who presents with a chief complaint of Lumbar epidural abscess (2018 03:15)      SUBJECTIVE / OVERNIGHT EVENTS: No acute events overnight. Denies chest pain, SOB. Has some back pain. No N/V/D.    MEDICATIONS  (STANDING):  atorvastatin 40 milliGRAM(s) Oral at bedtime  cefepime  IVPB 2000 milliGRAM(s) IV Intermittent every 8 hours  dextrose 5%. 1000 milliLiter(s) (50 mL/Hr) IV Continuous <Continuous>  dextrose 50% Injectable 12.5 Gram(s) IV Push once  dextrose 50% Injectable 25 Gram(s) IV Push once  dextrose 50% Injectable 25 Gram(s) IV Push once  docusate sodium 100 milliGRAM(s) Oral three times a day  insulin lispro (HumaLOG) corrective regimen sliding scale   SubCutaneous three times a day before meals  insulin lispro (HumaLOG) corrective regimen sliding scale   SubCutaneous at bedtime  lidocaine   Patch 1 Patch Transdermal daily  pantoprazole  Injectable 40 milliGRAM(s) IV Push two times a day  senna 2 Tablet(s) Oral at bedtime  sodium chloride 2 Gram(s) Oral three times a day  sucralfate 1 Gram(s) Oral four times a day  vancomycin  IVPB 1250 milliGRAM(s) IV Intermittent every 12 hours    MEDICATIONS  (PRN):  acetaminophen   Tablet 650 milliGRAM(s) Oral every 6 hours PRN For Temp greater than 38 C (100.4 F)  acetaminophen   Tablet. 650 milliGRAM(s) Oral every 6 hours PRN Mild Pain (1 - 3)  dextrose Gel 1 Dose(s) Oral once PRN Blood Glucose LESS THAN 70 milliGRAM(s)/deciliter  glucagon  Injectable 1 milliGRAM(s) IntraMuscular once PRN Glucose LESS THAN 70 milligrams/deciliter  morphine  - Injectable 2 milliGRAM(s) IV Push every 4 hours PRN Severe Pain (7 - 10)  ondansetron Injectable 4 milliGRAM(s) IV Push every 6 hours PRN Nausea and/or Vomiting  oxyCODONE    IR 5 milliGRAM(s) Oral every 4 hours PRN Moderate Pain (4 - 6)      T(C): 36.7 (18 @ 09:14), Max: 37.4 (18 @ 14:27)  HR: 80 (18 @ 09:14) (80 - 98)  BP: 133/91 (18 @ 09:14) (121/82 - 133/91)  RR: 17 (18 @ 09:14) (17 - 20)  SpO2: 94% (18 @ 09:14) (94% - 99%)  CAPILLARY BLOOD GLUCOSE      POCT Blood Glucose.: 125 mg/dL (2018 09:04)  POCT Blood Glucose.: 124 mg/dL (2018 22:32)  POCT Blood Glucose.: 126 mg/dL (2018 18:01)  POCT Blood Glucose.: 124 mg/dL (2018 12:58)    I&O's Summary    2018 07:01  -  2018 07:00  --------------------------------------------------------  IN: 300 mL / OUT: 750 mL / NET: -450 mL    2018 07:01  -  2018 12:06  --------------------------------------------------------  IN: 0 mL / OUT: 200 mL / NET: -200 mL        PHYSICAL EXAM:  GENERAL: no apparent distress, on room air  EYES: sclera clear b/l  CHEST/LUNG: Clear to auscultation bilaterally; No wheezing or crackles  HEART: s1/s2, no murmurs appreciated  ABDOMEN: Soft, Nontender, Nondistended; Bowel sounds present  EXTREMITIES:  2+ Peripheral Pulses, No clubbing, cyanosis, or edema  NEUROLOGY: awake, alert, responds to Qs appropriately, no gross focal deficits, moving all extremities    LABS:                        9.2    12.08 )-----------( 336      ( 2018 05:35 )             27.3     -08    125<L>  |  91<L>  |  8   ----------------------------<  151<H>  3.8   |  22  |  0.67    Ca    7.7<L>      2018 06:15    TPro  6.3  /  Alb  2.2<L>  /  TBili  0.8  /  DBili  x   /  AST  19  /  ALT  23  /  AlkPhos  98  04-08    PT/INR - ( 2018 03:20 )   PT: 13.3 SEC;   INR: 1.15          PTT - ( 2018 03:20 )  PTT:27.1 SEC      Urinalysis Basic - ( 2018 05:37 )    Color: PLYEL / Appearance: CLEAR / S.007 / pH: 7.0  Gluc: NEGATIVE / Ketone: TRACE  / Bili: NEGATIVE / Urobili: NORMAL mg/dL   Blood: NEGATIVE / Protein: NEGATIVE mg/dL / Nitrite: NEGATIVE   Leuk Esterase: SMALL / RBC: 0-2 / WBC 5-10   Sq Epi: OCC / Non Sq Epi: x / Bacteria: FEW        RADIOLOGY & ADDITIONAL TESTS:

## 2018-04-09 NOTE — PROGRESS NOTE ADULT - PROBLEM SELECTOR PLAN 1
No plan for neurosurgical intervention at this time  Transfer to medicine service  Follow neurologic status  Continue antibiotics No plan for neurosurgical intervention at this time as patient is neurologically intact with no radiculopathy or weaknes.    Case d.w Dr. Ny. Continue with antibiotics and neuro checks q 4 hours.   Transfer to medicine service  Follow neurologic status  Continue antibiotics

## 2018-04-09 NOTE — PROGRESS NOTE ADULT - ASSESSMENT
67 YO female with Lumbar epidural abscess, Psoas abscess, gastric ulcer, hyponatremia, and extensive bilateral DVT

## 2018-04-09 NOTE — PROGRESS NOTE ADULT - PROBLEM SELECTOR PLAN 1
Left Epidural L1-L4 and psoas abscess with osteomyelitis and discitis pending IR drainage  vanco/cefepime per ID recs, WBC downtrending  f/u cultures

## 2018-04-10 DIAGNOSIS — Z29.9 ENCOUNTER FOR PROPHYLACTIC MEASURES, UNSPECIFIED: ICD-10-CM

## 2018-04-10 LAB
APTT BLD: 29.6 SEC — SIGNIFICANT CHANGE UP (ref 27.5–37.4)
BUN SERPL-MCNC: 8 MG/DL — SIGNIFICANT CHANGE UP (ref 7–23)
CALCIUM SERPL-MCNC: 7.5 MG/DL — LOW (ref 8.4–10.5)
CHLORIDE SERPL-SCNC: 92 MMOL/L — LOW (ref 98–107)
CHLORIDE UR-SCNC: 44 MMOL/L — SIGNIFICANT CHANGE UP
CO2 SERPL-SCNC: 23 MMOL/L — SIGNIFICANT CHANGE UP (ref 22–31)
CREAT SERPL-MCNC: 0.75 MG/DL — SIGNIFICANT CHANGE UP (ref 0.5–1.3)
CULTURE RESULTS: SIGNIFICANT CHANGE UP
GLUCOSE SERPL-MCNC: 123 MG/DL — HIGH (ref 70–99)
HCT VFR BLD CALC: 29.3 % — LOW (ref 34.5–45)
HGB BLD-MCNC: 9.6 G/DL — LOW (ref 11.5–15.5)
HIV 1+2 AB+HIV1 P24 AG SERPL QL IA: SIGNIFICANT CHANGE UP
INR BLD: 1.25 — HIGH (ref 0.88–1.17)
MCHC RBC-ENTMCNC: 29.4 PG — SIGNIFICANT CHANGE UP (ref 27–34)
MCHC RBC-ENTMCNC: 32.8 % — SIGNIFICANT CHANGE UP (ref 32–36)
MCV RBC AUTO: 89.6 FL — SIGNIFICANT CHANGE UP (ref 80–100)
NRBC # FLD: 0 — SIGNIFICANT CHANGE UP
OSMOLALITY SERPL: 259 MOSMO/KG — LOW (ref 275–295)
OSMOLALITY UR: 206 MOSMO/KG — SIGNIFICANT CHANGE UP (ref 50–1200)
PLATELET # BLD AUTO: 330 K/UL — SIGNIFICANT CHANGE UP (ref 150–400)
PMV BLD: 8.9 FL — SIGNIFICANT CHANGE UP (ref 7–13)
POTASSIUM SERPL-MCNC: 3.4 MMOL/L — LOW (ref 3.5–5.3)
POTASSIUM SERPL-SCNC: 3.4 MMOL/L — LOW (ref 3.5–5.3)
POTASSIUM UR-SCNC: 13.1 MMOL/L — SIGNIFICANT CHANGE UP
PROTHROM AB SERPL-ACNC: 14.4 SEC — HIGH (ref 9.8–13.1)
RBC # BLD: 3.27 M/UL — LOW (ref 3.8–5.2)
RBC # FLD: 15 % — HIGH (ref 10.3–14.5)
SODIUM SERPL-SCNC: 128 MMOL/L — LOW (ref 135–145)
SODIUM UR-SCNC: 45 MMOL/L — SIGNIFICANT CHANGE UP
SPECIMEN SOURCE: SIGNIFICANT CHANGE UP
SURGICAL PATHOLOGY FINAL REPORT - CH: SIGNIFICANT CHANGE UP
WBC # BLD: 9.3 K/UL — SIGNIFICANT CHANGE UP (ref 3.8–10.5)
WBC # FLD AUTO: 9.3 K/UL — SIGNIFICANT CHANGE UP (ref 3.8–10.5)

## 2018-04-10 PROCEDURE — 99232 SBSQ HOSP IP/OBS MODERATE 35: CPT

## 2018-04-10 PROCEDURE — 99233 SBSQ HOSP IP/OBS HIGH 50: CPT

## 2018-04-10 PROCEDURE — 37191 INS ENDOVAS VENA CAVA FILTR: CPT

## 2018-04-10 RX ORDER — CEFTRIAXONE 500 MG/1
2 INJECTION, POWDER, FOR SOLUTION INTRAMUSCULAR; INTRAVENOUS ONCE
Qty: 0 | Refills: 0 | Status: COMPLETED | OUTPATIENT
Start: 2018-04-10 | End: 2018-04-10

## 2018-04-10 RX ORDER — INSULIN LISPRO 100/ML
VIAL (ML) SUBCUTANEOUS EVERY 6 HOURS
Qty: 0 | Refills: 0 | Status: DISCONTINUED | OUTPATIENT
Start: 2018-04-10 | End: 2018-04-11

## 2018-04-10 RX ORDER — HYDROCORTISONE 1 %
1 OINTMENT (GRAM) TOPICAL ONCE
Qty: 0 | Refills: 0 | Status: COMPLETED | OUTPATIENT
Start: 2018-04-10 | End: 2018-04-11

## 2018-04-10 RX ORDER — CEFTRIAXONE 500 MG/1
INJECTION, POWDER, FOR SOLUTION INTRAMUSCULAR; INTRAVENOUS
Qty: 0 | Refills: 0 | Status: DISCONTINUED | OUTPATIENT
Start: 2018-04-10 | End: 2018-04-22

## 2018-04-10 RX ORDER — SODIUM CHLORIDE 9 MG/ML
1000 INJECTION INTRAMUSCULAR; INTRAVENOUS; SUBCUTANEOUS
Qty: 0 | Refills: 0 | Status: DISCONTINUED | OUTPATIENT
Start: 2018-04-10 | End: 2018-04-11

## 2018-04-10 RX ORDER — POTASSIUM CHLORIDE 20 MEQ
40 PACKET (EA) ORAL ONCE
Qty: 0 | Refills: 0 | Status: COMPLETED | OUTPATIENT
Start: 2018-04-10 | End: 2018-04-10

## 2018-04-10 RX ORDER — CEFTRIAXONE 500 MG/1
2 INJECTION, POWDER, FOR SOLUTION INTRAMUSCULAR; INTRAVENOUS EVERY 24 HOURS
Qty: 0 | Refills: 0 | Status: DISCONTINUED | OUTPATIENT
Start: 2018-04-11 | End: 2018-04-22

## 2018-04-10 RX ADMIN — MORPHINE SULFATE 2 MILLIGRAM(S): 50 CAPSULE, EXTENDED RELEASE ORAL at 11:35

## 2018-04-10 RX ADMIN — Medication 166.67 MILLIGRAM(S): at 06:18

## 2018-04-10 RX ADMIN — Medication 166.67 MILLIGRAM(S): at 18:44

## 2018-04-10 RX ADMIN — PANTOPRAZOLE SODIUM 40 MILLIGRAM(S): 20 TABLET, DELAYED RELEASE ORAL at 18:41

## 2018-04-10 RX ADMIN — SODIUM CHLORIDE 75 MILLILITER(S): 9 INJECTION INTRAMUSCULAR; INTRAVENOUS; SUBCUTANEOUS at 16:00

## 2018-04-10 RX ADMIN — Medication 1 GRAM(S): at 23:59

## 2018-04-10 RX ADMIN — Medication 100 MILLIGRAM(S): at 21:21

## 2018-04-10 RX ADMIN — CEFEPIME 100 MILLIGRAM(S): 1 INJECTION, POWDER, FOR SOLUTION INTRAMUSCULAR; INTRAVENOUS at 05:09

## 2018-04-10 RX ADMIN — SODIUM CHLORIDE 2 GRAM(S): 9 INJECTION INTRAMUSCULAR; INTRAVENOUS; SUBCUTANEOUS at 05:03

## 2018-04-10 RX ADMIN — Medication 40 MILLIEQUIVALENT(S): at 18:41

## 2018-04-10 RX ADMIN — MORPHINE SULFATE 2 MILLIGRAM(S): 50 CAPSULE, EXTENDED RELEASE ORAL at 11:20

## 2018-04-10 RX ADMIN — LIDOCAINE 1 PATCH: 4 CREAM TOPICAL at 11:24

## 2018-04-10 RX ADMIN — SODIUM CHLORIDE 75 MILLILITER(S): 9 INJECTION INTRAMUSCULAR; INTRAVENOUS; SUBCUTANEOUS at 21:21

## 2018-04-10 RX ADMIN — Medication 1 GRAM(S): at 18:41

## 2018-04-10 RX ADMIN — SENNA PLUS 2 TABLET(S): 8.6 TABLET ORAL at 21:21

## 2018-04-10 RX ADMIN — Medication 1 GRAM(S): at 11:25

## 2018-04-10 RX ADMIN — CEFTRIAXONE 100 GRAM(S): 500 INJECTION, POWDER, FOR SOLUTION INTRAMUSCULAR; INTRAVENOUS at 11:23

## 2018-04-10 RX ADMIN — PANTOPRAZOLE SODIUM 40 MILLIGRAM(S): 20 TABLET, DELAYED RELEASE ORAL at 05:06

## 2018-04-10 RX ADMIN — Medication 1 GRAM(S): at 05:04

## 2018-04-10 RX ADMIN — ATORVASTATIN CALCIUM 40 MILLIGRAM(S): 80 TABLET, FILM COATED ORAL at 21:21

## 2018-04-10 NOTE — PROGRESS NOTE ADULT - SUBJECTIVE AND OBJECTIVE BOX
Patient is a 68y old  Female who presents with a chief complaint of Lumbar epidural abscess (08 Apr 2018 03:15)      SUBJECTIVE / OVERNIGHT EVENTS: No acute events overnight. : [id number] Patient feels tired today. No other complaints of chest pain, SOB, N/V/D. Daughter Dunia at bedside and plan discussed with her and patient.    MEDICATIONS  (STANDING):  atorvastatin 40 milliGRAM(s) Oral at bedtime  cefTRIAXone   IVPB      dextrose 5%. 1000 milliLiter(s) (50 mL/Hr) IV Continuous <Continuous>  dextrose 50% Injectable 12.5 Gram(s) IV Push once  dextrose 50% Injectable 25 Gram(s) IV Push once  dextrose 50% Injectable 25 Gram(s) IV Push once  docusate sodium 100 milliGRAM(s) Oral three times a day  insulin lispro (HumaLOG) corrective regimen sliding scale   SubCutaneous every 6 hours  insulin lispro (HumaLOG) corrective regimen sliding scale   SubCutaneous at bedtime  lidocaine   Patch 1 Patch Transdermal daily  pantoprazole  Injectable 40 milliGRAM(s) IV Push two times a day  senna 2 Tablet(s) Oral at bedtime  sodium chloride 2 Gram(s) Oral three times a day  sucralfate 1 Gram(s) Oral four times a day  vancomycin  IVPB 1250 milliGRAM(s) IV Intermittent every 12 hours    MEDICATIONS  (PRN):  acetaminophen   Tablet 650 milliGRAM(s) Oral every 6 hours PRN For Temp greater than 38 C (100.4 F)  acetaminophen   Tablet. 650 milliGRAM(s) Oral every 6 hours PRN Mild Pain (1 - 3)  dextrose Gel 1 Dose(s) Oral once PRN Blood Glucose LESS THAN 70 milliGRAM(s)/deciliter  glucagon  Injectable 1 milliGRAM(s) IntraMuscular once PRN Glucose LESS THAN 70 milligrams/deciliter  morphine  - Injectable 2 milliGRAM(s) IV Push every 4 hours PRN Severe Pain (7 - 10)  ondansetron Injectable 4 milliGRAM(s) IV Push every 6 hours PRN Nausea and/or Vomiting  oxyCODONE    IR 5 milliGRAM(s) Oral every 4 hours PRN Moderate Pain (4 - 6)      T(C): 36.7 (04-10-18 @ 10:37), Max: 37.4 (04-09-18 @ 21:43)  HR: 87 (04-10-18 @ 10:37) (85 - 95)  BP: 117/74 (04-10-18 @ 10:37) (107/68 - 139/84)  RR: 17 (04-10-18 @ 10:37) (16 - 18)  SpO2: 99% (04-10-18 @ 10:37) (96% - 100%)  CAPILLARY BLOOD GLUCOSE      POCT Blood Glucose.: 107 mg/dL (10 Apr 2018 12:29)  POCT Blood Glucose.: 106 mg/dL (10 Apr 2018 06:17)  POCT Blood Glucose.: 114 mg/dL (09 Apr 2018 22:08)  POCT Blood Glucose.: 116 mg/dL (09 Apr 2018 18:03)    I&O's Summary    09 Apr 2018 07:01  -  10 Apr 2018 07:00  --------------------------------------------------------  IN: 1700 mL / OUT: 2250 mL / NET: -550 mL    10 Apr 2018 07:01  -  10 Apr 2018 13:47  --------------------------------------------------------  IN: 50 mL / OUT: 250 mL / NET: -200 mL    PHYSICAL EXAM:  GENERAL: no apparent distress, on room air  EYES: sclera clear b/l  CHEST/LUNG: Clear to auscultation bilaterally; No wheezing or crackles  HEART: s1/s2, no murmurs appreciated  ABDOMEN: Soft, Nontender, Nondistended; Bowel sounds present  EXTREMITIES:  2+ Peripheral Pulses, No clubbing, cyanosis, or edema  NEUROLOGY: awake, alert, responds to Qs appropriately, no gross focal deficits, moving all extremities, no sensory deficits    LABS:                        9.6    9.30  )-----------( 330      ( 10 Apr 2018 09:10 )             29.3     04-10    128<L>  |  92<L>  |  8   ----------------------------<  123<H>  3.4<L>   |  23  |  0.75    Ca    7.5<L>      10 Apr 2018 09:10      PT/INR - ( 10 Apr 2018 09:10 )   PT: 14.4 SEC;   INR: 1.25          PTT - ( 10 Apr 2018 09:10 )  PTT:29.6 SEC          RADIOLOGY & ADDITIONAL TESTS:

## 2018-04-10 NOTE — PROGRESS NOTE ADULT - PROBLEM SELECTOR PLAN 2
+ hypercoagulable state, ? underlying malignancy  CT A/P done-showed thrombus in infrarenal IVC and b/l common iliac veins  - holding A/C, awaiting IVC filter today by IR

## 2018-04-10 NOTE — PROGRESS NOTE ADULT - ASSESSMENT
· Assessment	  68 f with DM2, HTN, HLD - gastric ulcer presented to [facility] 4/5/18 for fever and back pain, was found to have L1-L4 left sided posterior epidural abscess with discitis and osteomyelitis.   urine cx: E. faecalis  pt received vanco and zosyn then switched to Imipenem as patient was febrile, then transferred to [facility] for abscess drainage, here abd CT showed also psoas abscess with extensive DVT in b/l iliac and infrarenal IVC  pt is from , denied any TB exposure  now blood cx with aggregatibacter    Aggrigatebacter bacteremia, Left Epidural L1-L4 and psoas abscess with osteomyelitis and discitis  extensive DVT in b/l iliac veins and infrarenal IVC  GI bleed    going for IR abscess drainage, psoas or epidural drainage, cultures for AFB, fungal and regular cx  f/u quantiferon and  HIV  repeat blood cultures  continue Vancomycin 1 g q 12  monitor Vancomycin trough, check tomorrow  switch cefepime to ceftriaxone 2 g q d  will need DERRICK as well

## 2018-04-10 NOTE — CHART NOTE - NSCHARTNOTEFT_GEN_A_CORE
Patient Age: 68    Patient Gender:Female    Procedure (including site/side if known):IVC filter    Diagnosis/Indication: Infrarenal IVC thrombus and b/l common iliac thrombus    Interventional Radiology Attending Physician: Dr. Jose Rogers    Ordering Attending Physician: Karolina Carrasco    Pertinent medical history: Epidural abscess,psoas abscess, acute GI bleed,HTN,HLD    Pertinent Labs:                       9.6    9.30  )-----------( 330      ( 10 Apr 2018 09:10 )             29.3   04-10    128<L>  |  92<L>  |  8   ----------------------------<  123<H>  3.4<L>   |  23  |  0.75    Ca    7.5<L>      10 Apr 2018 09:10    PT/INR - ( 10 Apr 2018 09:10 )   PT: 14.4 SEC;   INR: 1.25          PTT - ( 10 Apr 2018 09:10 )  PTT:29.6 SEC      Patient and Family aware? Yes      Attending/Resident/NP/GRETTA Davis NP    Contact:            [id number]                   Date:   4/10/2018                                 time: 12:36 pm

## 2018-04-10 NOTE — PROGRESS NOTE ADULT - ATTENDING COMMENTS
daughter: Ian: [phone]  sister: Dorys: [phone]    Dispo: pending EGD, IVC filter by IR, Abx course by ID  Discussed with daughterDunia at bedside

## 2018-04-10 NOTE — PROGRESS NOTE ADULT - PROBLEM SELECTOR PLAN 3
initially thought to be secondary to HCTZ improved off HCTZ   Low serum osm, suspect hypotonic hypovolemic hyponatremia  DC salt tabs and fluid restriction, start NS IVF, sodium improving, TSH WNL

## 2018-04-10 NOTE — PROGRESS NOTE ADULT - PROBLEM SELECTOR PLAN 4
H/H stable, s/p 4 U prbc with appropriate rise , NPO for EGD today to reeval gastric ulcer. Per surgeon, Dr. Adams, patient with severe GIB at [facility], recommends against A/C. Patient for IVC filter today for infrarenal and iliac thrombus  PPI IV BID  GI following

## 2018-04-10 NOTE — PROGRESS NOTE ADULT - SUBJECTIVE AND OBJECTIVE BOX
Follow Up:  bacteremia, psoas and epidural abscess    Interval History: pt is afebrile now, WBC normalized going for EGD for GIB and IVC filter placement today, awaiting IR abscess drainage    ROS:      All other systems negative    Constitutional: no fever, no chills  Head: no trauma  Eyes: no vision changes, no eye pain  ENT:  no vision changes, no sore throat, no rhinorrhea  Cardiovascular:  no chest pain, no palpitation  Respiratory:  no SOB, no cough  GI:  + abd pain, no vomiting, no diarrhea  urinary: no dysuria, no hematuria, no flank pain  musculoskeletal:  +back pain  skin:  no rash  neurology:  no headache, no seizure, no change in mental status  psych: no anxiety, no depression         Allergies  No Known Allergies        ANTIMICROBIALS:  cefTRIAXone   IVPB    vancomycin  IVPB 1250 every 12 hours      OTHER MEDS:  acetaminophen   Tablet 650 milliGRAM(s) Oral every 6 hours PRN  acetaminophen   Tablet. 650 milliGRAM(s) Oral every 6 hours PRN  atorvastatin 40 milliGRAM(s) Oral at bedtime  dextrose 5%. 1000 milliLiter(s) IV Continuous <Continuous>  dextrose 50% Injectable 12.5 Gram(s) IV Push once  dextrose 50% Injectable 25 Gram(s) IV Push once  dextrose 50% Injectable 25 Gram(s) IV Push once  dextrose Gel 1 Dose(s) Oral once PRN  docusate sodium 100 milliGRAM(s) Oral three times a day  glucagon  Injectable 1 milliGRAM(s) IntraMuscular once PRN  insulin lispro (HumaLOG) corrective regimen sliding scale   SubCutaneous every 6 hours  insulin lispro (HumaLOG) corrective regimen sliding scale   SubCutaneous at bedtime  lidocaine   Patch 1 Patch Transdermal daily  morphine  - Injectable 2 milliGRAM(s) IV Push every 4 hours PRN  ondansetron Injectable 4 milliGRAM(s) IV Push every 6 hours PRN  oxyCODONE    IR 5 milliGRAM(s) Oral every 4 hours PRN  pantoprazole  Injectable 40 milliGRAM(s) IV Push two times a day  senna 2 Tablet(s) Oral at bedtime  sodium chloride 2 Gram(s) Oral three times a day  sucralfate 1 Gram(s) Oral four times a day      Vital Signs Last 24 Hrs  T(C): 36.7 (10 Apr 2018 10:37), Max: 37.4 (09 Apr 2018 21:43)  T(F): 98 (10 Apr 2018 10:37), Max: 99.3 (09 Apr 2018 21:43)  HR: 87 (10 Apr 2018 10:37) (85 - 95)  BP: 117/74 (10 Apr 2018 10:37) (107/68 - 139/84)  BP(mean): --  RR: 17 (10 Apr 2018 10:37) (16 - 18)  SpO2: 99% (10 Apr 2018 10:37) (96% - 100%)    Physical Exam:  General:    NAD,  non toxic,  Head: atraumatic, normocephalic  Eye: normal sclera and conjunctiva  ENT:    no oropharyngeal lesions,   no LAD,   neck supple  Cardio:     regular S1, S2,  no murmur  Respiratory:    clear b/l,    no wheezing  abd:     soft,   BS +,  very slight diffuse tenderness on the left side  :   no CVAT,  no suprapubic tenderness,   no  mcclendon  Musculoskeletal: spine tenderness on the lumbar area  vascular: no lines, normal pulses, no LE edema  Skin:    no rash  Neurologic:     no focal deficit  psych: normal affect, no suicidal ideation                          9.6    9.30  )-----------( 330      ( 10 Apr 2018 09:10 )             29.3       04-10    128<L>  |  92<L>  |  8   ----------------------------<  123<H>  3.4<L>   |  23  |  0.75    Ca    7.5<L>      10 Apr 2018 09:10            MICROBIOLOGY:  Vancomycin Level, Trough: 13.9 ug/mL (04-09-18 @ 17:00)  v  BLOOD  04-08-18 --  --  --      .Blood Blood  04-06-18   No growth to date.  --  --      .Blood Blood  04-06-18   Growth in aerobic bottle: Aggregatibacter aphrophilus  "Susceptibilities not performed"    .Blood Blood  04-04-18   No growth at 5 days.  --  --      .Urine Clean Catch (Midstream)  04-04-18   10,000 - 49,000 CFU/mL Enterococcus faecalis  --  Enterococcus faecalis      .Urine Clean Catch (Midstream)  03-25-18   No growth  --  --                RADIOLOGY:    < from: CT Chest w/wo IV Cont (04.09.18 @ 09:30) >  IMPRESSION: Small left pleural effusion with bilateral lower lobe areas   of dependent atelectasis.    2 tiny bilateral nonspecific pulmonary nodules as above. Follow-up can be   performed for further evaluation.    8 mm nodule within the peripheral aspect of the right breast as above.        < from: CT Abdomen and Pelvis w/ IV Cont (04.08.18 @ 12:00) >  IMPRESSION:   Multiloculated peripherally enhancing fluid collection tracking down the   left psoas muscle, likely an abscess.    Thrombus within the infrarenal IVC and bilateral common iliac veins.    Limited evaluation of the stomach secondary to underdistention/ lack of   oral contrast.     8 mm right outer breast nodule, for which correlation with mammography is

## 2018-04-10 NOTE — PROGRESS NOTE ADULT - PROBLEM SELECTOR PLAN 1
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 Per IR attending, abscesses not in the location or large enough to be drained  vanco/ CTX per ID recs, WBC downtrending  f/u cultures Left Epidural L1-L4 and psoas abscess with osteomyelitis and discitis. Per IR attending, abscesses not in the location or large enough to be drained  vanco/ CTX per ID recs, WBC downtrending  4/6 blood cx with aggregatibacter, TTE/DERRICK needed

## 2018-04-10 NOTE — CHART NOTE - NSCHARTNOTEFT_GEN_A_CORE
GI addendum    Please keep patient NPO today for EGD to re-evaluate the gastric ulcer, for biopsies and to evaluate for risk of rebleeding with anticoagulation    Hung  GI fellow  [id number]

## 2018-04-10 NOTE — PROGRESS NOTE ADULT - SUBJECTIVE AND OBJECTIVE BOX
Vital Signs Last 24 Hrs  T(C): 37.1 (10 Apr 2018 05:02), Max: 37.4 (09 Apr 2018 21:43)  T(F): 98.8 (10 Apr 2018 05:02), Max: 99.3 (09 Apr 2018 21:43)  HR: 86 (10 Apr 2018 05:02) (80 - 95)  BP: 107/68 (10 Apr 2018 05:02) (107/68 - 139/84)  BP(mean): --  RR: 17 (10 Apr 2018 05:02) (16 - 18)  SpO2: 97% (10 Apr 2018 05:02) (94% - 100%)    I&O's Detail    09 Apr 2018 07:01  -  10 Apr 2018 07:00  --------------------------------------------------------  IN:    IV PiggyBack: 800 mL    Oral Fluid: 900 mL  Total IN: 1700 mL    OUT:    Voided: 2250 mL  Total OUT: 2250 mL    Total NET: -550 mL                                9.2    12.08 )-----------( 336      ( 09 Apr 2018 05:35 )             27.3       04-09    126<L>  |  92<L>  |  10  ----------------------------<  117<H>  3.8   |  19<L>  |  0.69    Ca    7.4<L>      09 Apr 2018 17:00            PLAN:  For IVC filter today  Epidural abscess management as per neurosurgery

## 2018-04-10 NOTE — CHART NOTE - NSCHARTNOTEFT_GEN_A_CORE
ADS NIGHT COVERAGE:    Notified by RN that pt c/o rash on left upper arm. Pt seen at bedside. Admits to mild itching around rash that began today. Denies any other acute symptoms. Erythematous maculopapular rash noted under left upper arm. No tenderness, ecchymosis, discharge or blistering noted.     -hydrocortisone 0.5% topical cream x1 ordered.    Will continue to monitor.  ASHLEY CANTU PA-C  [id number]

## 2018-04-11 ENCOUNTER — RESULT REVIEW (OUTPATIENT)
Age: 68
End: 2018-04-11

## 2018-04-11 LAB
BUN SERPL-MCNC: 6 MG/DL — LOW (ref 7–23)
BUN SERPL-MCNC: 7 MG/DL — SIGNIFICANT CHANGE UP (ref 7–23)
CALCIUM SERPL-MCNC: 7.1 MG/DL — LOW (ref 8.4–10.5)
CALCIUM SERPL-MCNC: 7.1 MG/DL — LOW (ref 8.4–10.5)
CHLORIDE SERPL-SCNC: 95 MMOL/L — LOW (ref 98–107)
CHLORIDE SERPL-SCNC: 96 MMOL/L — LOW (ref 98–107)
CO2 SERPL-SCNC: 20 MMOL/L — LOW (ref 22–31)
CO2 SERPL-SCNC: 21 MMOL/L — LOW (ref 22–31)
CREAT SERPL-MCNC: 0.67 MG/DL — SIGNIFICANT CHANGE UP (ref 0.5–1.3)
CREAT SERPL-MCNC: 0.74 MG/DL — SIGNIFICANT CHANGE UP (ref 0.5–1.3)
GLUCOSE SERPL-MCNC: 105 MG/DL — HIGH (ref 70–99)
GLUCOSE SERPL-MCNC: 93 MG/DL — SIGNIFICANT CHANGE UP (ref 70–99)
HCT VFR BLD CALC: 25.4 % — LOW (ref 34.5–45)
HGB BLD-MCNC: 8.4 G/DL — LOW (ref 11.5–15.5)
MAGNESIUM SERPL-MCNC: 1.6 MG/DL — SIGNIFICANT CHANGE UP (ref 1.6–2.6)
MCHC RBC-ENTMCNC: 29.6 PG — SIGNIFICANT CHANGE UP (ref 27–34)
MCHC RBC-ENTMCNC: 33.1 % — SIGNIFICANT CHANGE UP (ref 32–36)
MCV RBC AUTO: 89.4 FL — SIGNIFICANT CHANGE UP (ref 80–100)
NRBC # FLD: 0 — SIGNIFICANT CHANGE UP
PLATELET # BLD AUTO: 293 K/UL — SIGNIFICANT CHANGE UP (ref 150–400)
PMV BLD: 8.9 FL — SIGNIFICANT CHANGE UP (ref 7–13)
POTASSIUM SERPL-MCNC: 3.6 MMOL/L — SIGNIFICANT CHANGE UP (ref 3.5–5.3)
POTASSIUM SERPL-MCNC: 3.9 MMOL/L — SIGNIFICANT CHANGE UP (ref 3.5–5.3)
POTASSIUM SERPL-SCNC: 3.6 MMOL/L — SIGNIFICANT CHANGE UP (ref 3.5–5.3)
POTASSIUM SERPL-SCNC: 3.9 MMOL/L — SIGNIFICANT CHANGE UP (ref 3.5–5.3)
RBC # BLD: 2.84 M/UL — LOW (ref 3.8–5.2)
RBC # FLD: 14.7 % — HIGH (ref 10.3–14.5)
SODIUM SERPL-SCNC: 128 MMOL/L — LOW (ref 135–145)
SODIUM SERPL-SCNC: 131 MMOL/L — LOW (ref 135–145)
VANCOMYCIN TROUGH SERPL-MCNC: 15 UG/ML — SIGNIFICANT CHANGE UP (ref 10–20)
WBC # BLD: 9.96 K/UL — SIGNIFICANT CHANGE UP (ref 3.8–10.5)
WBC # FLD AUTO: 9.96 K/UL — SIGNIFICANT CHANGE UP (ref 3.8–10.5)

## 2018-04-11 PROCEDURE — 88305 TISSUE EXAM BY PATHOLOGIST: CPT | Mod: 26

## 2018-04-11 PROCEDURE — 99233 SBSQ HOSP IP/OBS HIGH 50: CPT

## 2018-04-11 PROCEDURE — 99232 SBSQ HOSP IP/OBS MODERATE 35: CPT

## 2018-04-11 PROCEDURE — 88312 SPECIAL STAINS GROUP 1: CPT | Mod: 26

## 2018-04-11 PROCEDURE — 43239 EGD BIOPSY SINGLE/MULTIPLE: CPT | Mod: GC

## 2018-04-11 RX ORDER — INSULIN LISPRO 100/ML
VIAL (ML) SUBCUTANEOUS
Qty: 0 | Refills: 0 | Status: DISCONTINUED | OUTPATIENT
Start: 2018-04-11 | End: 2018-04-22

## 2018-04-11 RX ORDER — SODIUM CHLORIDE 9 MG/ML
1000 INJECTION INTRAMUSCULAR; INTRAVENOUS; SUBCUTANEOUS
Qty: 0 | Refills: 0 | Status: DISCONTINUED | OUTPATIENT
Start: 2018-04-11 | End: 2018-04-12

## 2018-04-11 RX ORDER — PANTOPRAZOLE SODIUM 20 MG/1
40 TABLET, DELAYED RELEASE ORAL
Qty: 0 | Refills: 0 | Status: DISCONTINUED | OUTPATIENT
Start: 2018-04-11 | End: 2018-04-13

## 2018-04-11 RX ADMIN — Medication 1 GRAM(S): at 23:59

## 2018-04-11 RX ADMIN — CEFTRIAXONE 100 GRAM(S): 500 INJECTION, POWDER, FOR SOLUTION INTRAMUSCULAR; INTRAVENOUS at 11:55

## 2018-04-11 RX ADMIN — SENNA PLUS 2 TABLET(S): 8.6 TABLET ORAL at 22:44

## 2018-04-11 RX ADMIN — Medication 166.67 MILLIGRAM(S): at 17:55

## 2018-04-11 RX ADMIN — SODIUM CHLORIDE 75 MILLILITER(S): 9 INJECTION INTRAMUSCULAR; INTRAVENOUS; SUBCUTANEOUS at 05:55

## 2018-04-11 RX ADMIN — Medication 1 APPLICATION(S): at 01:20

## 2018-04-11 RX ADMIN — PANTOPRAZOLE SODIUM 40 MILLIGRAM(S): 20 TABLET, DELAYED RELEASE ORAL at 05:55

## 2018-04-11 RX ADMIN — LIDOCAINE 1 PATCH: 4 CREAM TOPICAL at 23:55

## 2018-04-11 RX ADMIN — LIDOCAINE 1 PATCH: 4 CREAM TOPICAL at 00:03

## 2018-04-11 RX ADMIN — Medication 1 GRAM(S): at 05:55

## 2018-04-11 RX ADMIN — LIDOCAINE 1 PATCH: 4 CREAM TOPICAL at 11:55

## 2018-04-11 RX ADMIN — SODIUM CHLORIDE 100 MILLILITER(S): 9 INJECTION INTRAMUSCULAR; INTRAVENOUS; SUBCUTANEOUS at 22:44

## 2018-04-11 RX ADMIN — ATORVASTATIN CALCIUM 40 MILLIGRAM(S): 80 TABLET, FILM COATED ORAL at 22:44

## 2018-04-11 RX ADMIN — Medication 166.67 MILLIGRAM(S): at 06:50

## 2018-04-11 RX ADMIN — Medication 1 GRAM(S): at 17:56

## 2018-04-11 RX ADMIN — Medication 100 MILLIGRAM(S): at 14:54

## 2018-04-11 RX ADMIN — PANTOPRAZOLE SODIUM 40 MILLIGRAM(S): 20 TABLET, DELAYED RELEASE ORAL at 17:56

## 2018-04-11 RX ADMIN — Medication 1 GRAM(S): at 11:57

## 2018-04-11 RX ADMIN — Medication 100 MILLIGRAM(S): at 22:44

## 2018-04-11 RX ADMIN — Medication 100 MILLIGRAM(S): at 05:55

## 2018-04-11 RX ADMIN — SODIUM CHLORIDE 100 MILLILITER(S): 9 INJECTION INTRAMUSCULAR; INTRAVENOUS; SUBCUTANEOUS at 11:30

## 2018-04-11 NOTE — PROGRESS NOTE ADULT - ASSESSMENT
68 year old female with DM2, HTN, HLD, gastric ulcer presented to [facility] 4/5/18 for fever and back pain, was found to have L1-L4 left sided posterior epidural abscess with discitis and osteomyelitis.   CT showed also psoas abscess with extensive DVT in b/l iliac and infrarenal IVC  Pt is from , denied any TB exposure    Aggrigatebacter bacteremia, Left Epidural L1-L4 and psoas abscess with osteomyelitis and discitis  extensive DVT in b/l iliac veins and infrarenal IVC  GI bleed s/p EGD with nonbleeding gastric and duodenal ulcers  Blood cx with aggregatibacter  Repeat cx from 4/8/18 NGTD  -Per primary team, abscesses not in the location or large enough to be drained by IR     Recommend:  -F/U quantiferon  -Repeat blood cultures x 2 sets  -Continue vancomycin 1 g q 12  -Vancomycin trough 15 - therapeutic  -Continue ceftriaxone 2 g q d  -DERRICK

## 2018-04-11 NOTE — PROGRESS NOTE ADULT - NSHPATTENDINGPLANDISCUSS_GEN_ALL_CORE
daughter: Dunia at bedside and patient, GI fellow, surgeon: Dr. Adams and IR fellow and attending son: Martin at bedside and patient, GI fellow, surgeon: Dr. Adams and PA

## 2018-04-11 NOTE — PROGRESS NOTE ADULT - ATTENDING COMMENTS
daughter: Ian: [phone]  sister: Dorys: [phone]    Dispo: pending EGD, IVC filter by IR, Abx course by ID  Discussed with daughterDunia at bedside daughter: Ian: [phone]  sister: Dorys: [phone]    Dispo: pending DERRICK, improvement in Na, Abx course by ID, will likely need PICC  Discussed with son, Martin at bedside

## 2018-04-11 NOTE — PROGRESS NOTE ADULT - SUBJECTIVE AND OBJECTIVE BOX
D TEAM / SURGICAL ONCOLOGY (#[id number]) PROGRESS NOTE  ---------------------------------------------------------------------------------------------     HD#: 4    INTERVAL EVENTS: IVC filter placed yesterday by IR.  No further acute events.     SUBJECTIVE: Pt seen + examined.  Denies current complaints.  No recent bloody BM.    ---------------------------------------------------------------------------------------------   VITALS  T(C): 37.6 (04-11-18 @ 05:45), Max: 37.6 (04-11-18 @ 05:45)  HR: 86 (04-11-18 @ 05:45) (86 - 102)  BP: 110/70 (04-11-18 @ 05:45) (110/70 - 138/87)  RR: 18 (04-11-18 @ 05:45) (17 - 18)  SpO2: 95% (04-11-18 @ 05:45) (95% - 100%)  POCT Blood Glucose.: 97 mg/dL (11 Apr 2018 06:05)  POCT Blood Glucose.: 133 mg/dL (10 Apr 2018 21:39)  POCT Blood Glucose.: 100 mg/dL (10 Apr 2018 17:58)  POCT Blood Glucose.: 107 mg/dL (10 Apr 2018 12:29)      Is/Os    04-10 @ 07:01  -  04-11 @ 07:00  --------------------------------------------------------  IN:    IV PiggyBack: 300 mL    sodium chloride 0.9%.: 825 mL  Total IN: 1125 mL    OUT:    Voided: 1700 mL  Total OUT: 1700 mL    Total NET: -575 mL          ---------------------------------------------------------------------------------------------   PHYSICAL EXAM: ***  General: NAD, Lying in bed comfortably  Neuro: alert, oriented x3  HEENT: NC/AT, EOMI  Neck: Soft, supple  Cardio: RRR, nml S1/S2  Resp: Good effort, CTA b/l  GI/Abd: Soft, NT/ND, no rebound/guarding, no masses palpated  Vascular: All 4 extremities warm.  Skin: Intact, no breakdown  Lymphatic/Nodes: No palpable lymphadenopathy  Musculoskeletal: All 4 extremities moving spontaneously, no limitations, no LE edema    ---------------------------------------------------------------------------------------------   MEDICATIONS (STANDING): atorvastatin 40 milliGRAM(s) Oral at bedtime  cefTRIAXone   IVPB 2 Gram(s) IV Intermittent every 24 hours  cefTRIAXone   IVPB      dextrose 5%. 1000 milliLiter(s) IV Continuous <Continuous>  dextrose 50% Injectable 12.5 Gram(s) IV Push once  dextrose 50% Injectable 25 Gram(s) IV Push once  dextrose 50% Injectable 25 Gram(s) IV Push once  docusate sodium 100 milliGRAM(s) Oral three times a day  insulin lispro (HumaLOG) corrective regimen sliding scale   SubCutaneous every 6 hours  insulin lispro (HumaLOG) corrective regimen sliding scale   SubCutaneous at bedtime  pantoprazole  Injectable 40 milliGRAM(s) IV Push two times a day  senna 2 Tablet(s) Oral at bedtime  sodium chloride 0.9%. 1000 milliLiter(s) IV Continuous <Continuous>  sucralfate 1 Gram(s) Oral four times a day  vancomycin  IVPB 1250 milliGRAM(s) IV Intermittent every 12 hours    MEDICATIONS (PRN):acetaminophen   Tablet 650 milliGRAM(s) Oral every 6 hours PRN For Temp greater than 38 C (100.4 F)  acetaminophen   Tablet. 650 milliGRAM(s) Oral every 6 hours PRN Mild Pain (1 - 3)  dextrose Gel 1 Dose(s) Oral once PRN Blood Glucose LESS THAN 70 milliGRAM(s)/deciliter  glucagon  Injectable 1 milliGRAM(s) IntraMuscular once PRN Glucose LESS THAN 70 milligrams/deciliter  morphine  - Injectable 2 milliGRAM(s) IV Push every 4 hours PRN Severe Pain (7 - 10)  ondansetron Injectable 4 milliGRAM(s) IV Push every 6 hours PRN Nausea and/or Vomiting  oxyCODONE    IR 5 milliGRAM(s) Oral every 4 hours PRN Moderate Pain (4 - 6)      ---------------------------------------------------------------------------------------------   LABS  CBC (04-11 @ 05:15)                              8.4<L>                         9.96    )----------------(  293        --    % Neutrophils, --    % Lymphocytes, ANC: --                                  25.4<L>  CBC (04-10 @ 09:10)                              9.6<L>                         9.30    )----------------(  330        --    % Neutrophils, --    % Lymphocytes, ANC: --                                  29.3<L>    BMP (04-11 @ 05:15)             128<L>  |  95<L>   |  7     		Ca++ --      Ca 7.1<L>             ---------------------------------( 93    		Mg 1.6                3.9     |  20<L>   |  0.74  			Ph --      BMP (04-10 @ 09:10)             128<L>  |  92<L>   |  8     		Ca++ --      Ca 7.5<L>             ---------------------------------( 123<H>		Mg --                 3.4<L>  |  23      |  0.75  			Ph --          Coags (04-10 @ 09:10)  aPTT 29.6 / INR 1.25<H> / PT 14.4<H>            IMAGING STUDIES      ---------------------------------------------------------------------------------------------       ASSESSMENT  68y female s/p  ***    PLAN  -   - Diet:   - Pain control with PO/IV medications.    - Continue home medications  - OOB, ambulate as tolerated  - continue chemical VTE ppx  - Will discuss with attending. D TEAM / SURGICAL ONCOLOGY (#04553) PROGRESS NOTE  ---------------------------------------------------------------------------------------------     HD#: 4    INTERVAL EVENTS: IVC filter placed yesterday by IR.  No further acute events.     SUBJECTIVE: Pt seen + examined.  Denies current complaints.  No recent bloody BM.    ---------------------------------------------------------------------------------------------   VITALS  T(C): 37.6 (04-11-18 @ 05:45), Max: 37.6 (04-11-18 @ 05:45)  HR: 86 (04-11-18 @ 05:45) (86 - 102)  BP: 110/70 (04-11-18 @ 05:45) (110/70 - 138/87)  RR: 18 (04-11-18 @ 05:45) (17 - 18)  SpO2: 95% (04-11-18 @ 05:45) (95% - 100%)  POCT Blood Glucose.: 97 mg/dL (11 Apr 2018 06:05)  POCT Blood Glucose.: 133 mg/dL (10 Apr 2018 21:39)  POCT Blood Glucose.: 100 mg/dL (10 Apr 2018 17:58)  POCT Blood Glucose.: 107 mg/dL (10 Apr 2018 12:29)      Is/Os    04-10 @ 07:01  -  04-11 @ 07:00  --------------------------------------------------------  IN:    IV PiggyBack: 300 mL    sodium chloride 0.9%.: 825 mL  Total IN: 1125 mL    OUT:    Voided: 1700 mL  Total OUT: 1700 mL    Total NET: -575 mL      ---------------------------------------------------------------------------------------------   PHYSICAL EXAM:   General: NAD, Sitting in chair comfortably  Neuro: awake, alert  GI/Abd: Soft, NT/ND    ---------------------------------------------------------------------------------------------   MEDICATIONS (STANDING): atorvastatin 40 milliGRAM(s) Oral at bedtime  cefTRIAXone   IVPB 2 Gram(s) IV Intermittent every 24 hours  cefTRIAXone   IVPB      dextrose 5%. 1000 milliLiter(s) IV Continuous <Continuous>  dextrose 50% Injectable 12.5 Gram(s) IV Push once  dextrose 50% Injectable 25 Gram(s) IV Push once  dextrose 50% Injectable 25 Gram(s) IV Push once  docusate sodium 100 milliGRAM(s) Oral three times a day  insulin lispro (HumaLOG) corrective regimen sliding scale   SubCutaneous every 6 hours  insulin lispro (HumaLOG) corrective regimen sliding scale   SubCutaneous at bedtime  pantoprazole  Injectable 40 milliGRAM(s) IV Push two times a day  senna 2 Tablet(s) Oral at bedtime  sodium chloride 0.9%. 1000 milliLiter(s) IV Continuous <Continuous>  sucralfate 1 Gram(s) Oral four times a day  vancomycin  IVPB 1250 milliGRAM(s) IV Intermittent every 12 hours    MEDICATIONS (PRN):acetaminophen   Tablet 650 milliGRAM(s) Oral every 6 hours PRN For Temp greater than 38 C (100.4 F)  acetaminophen   Tablet. 650 milliGRAM(s) Oral every 6 hours PRN Mild Pain (1 - 3)  dextrose Gel 1 Dose(s) Oral once PRN Blood Glucose LESS THAN 70 milliGRAM(s)/deciliter  glucagon  Injectable 1 milliGRAM(s) IntraMuscular once PRN Glucose LESS THAN 70 milligrams/deciliter  morphine  - Injectable 2 milliGRAM(s) IV Push every 4 hours PRN Severe Pain (7 - 10)  ondansetron Injectable 4 milliGRAM(s) IV Push every 6 hours PRN Nausea and/or Vomiting  oxyCODONE    IR 5 milliGRAM(s) Oral every 4 hours PRN Moderate Pain (4 - 6)      ---------------------------------------------------------------------------------------------   LABS  CBC (04-11 @ 05:15)                              8.4<L>                         9.96    )----------------(  293        --    % Neutrophils, --    % Lymphocytes, ANC: --                                  25.4<L>  CBC (04-10 @ 09:10)                              9.6<L>                         9.30    )----------------(  330        --    % Neutrophils, --    % Lymphocytes, ANC: --                                  29.3<L>    BMP (04-11 @ 05:15)             128<L>  |  95<L>   |  7     		Ca++ --      Ca 7.1<L>             ---------------------------------( 93    		Mg 1.6                3.9     |  20<L>   |  0.74  			Ph --      BMP (04-10 @ 09:10)             128<L>  |  92<L>   |  8     		Ca++ --      Ca 7.5<L>             ---------------------------------( 123<H>		Mg --                 3.4<L>  |  23      |  0.75  			Ph --          Rakan (04-10 @ 09:10)  aPTT 29.6 / INR 1.25<H> / PT 14.4<H>    ---------------------------------------------------------------------------------------------     PATHOLOGY:  Large antral ulcer; biopsy:  - Chronic mildly active gastritis with focal surface erosion and  regenerative epithelial changes.  - Cresyl violet stain is negative for H. pylori-like  microorganisms.

## 2018-04-11 NOTE — DISCHARGE NOTE ADULT - CARE PROVIDERS DIRECT ADDRESSES
,DirectAddress_Unknown,[email or web address]

## 2018-04-11 NOTE — DISCHARGE NOTE ADULT - CARE PROVIDER_API CALL
Sarai Salmeron), Internal Medicine  [address] [place] [address]  Phone: [phone]  Fax: [phone]    Mahnaz Freire), Gastroenterology; Internal Medicine  [address] [facility]  [address] [place] [address]  Phone: [phone]  Fax: [phone]

## 2018-04-11 NOTE — PROGRESS NOTE ADULT - PROBLEM SELECTOR PLAN 3
initially thought to be secondary to HCTZ improved off HCTZ   Low serum osm, suspect hypotonic hypovolemic hyponatremia  DC salt tabs and fluid restriction, start NS IVF, sodium improving, TSH WNL initially thought to be secondary to HCTZ improved off HCTZ   Low serum osm, suspect hypotonic hypovolemic hyponatremia  continue NS IVF, sodium improving, TSH WNL, repeat BMP in PM

## 2018-04-11 NOTE — PROGRESS NOTE ADULT - ASSESSMENT
ASSESSMENT  68y female with GI bleed, likely 2/2 gastric mass, now stable.      PLAN  - path from [facility] benign.  - f/u path from repeat biopsy yesterday  - Rest of care as per medical teams  - Care of epidural abscess as per ID.      Chris Saenz MD PGY3  D Team Surgery, #[id number] ASSESSMENT  68y female with GI bleed, likely 2/2 gastric mass, now stable.      PLAN  - path from [facility] benign.  - f/u path from repeat biopsy today  - Rest of care as per medical teams  - Care of epidural abscess as per ID.      Chris Saenz MD PGY3  D Team Surgery, #[id number]

## 2018-04-11 NOTE — PROGRESS NOTE ADULT - SUBJECTIVE AND OBJECTIVE BOX
Patient is a 68y old  Female who presents with a chief complaint of Lumbar epidural abscess (08 Apr 2018 03:15)      SUBJECTIVE / OVERNIGHT EVENTS: No acute events overnight. : [id number]. No complaints of chest pain, SOB, N/V/D. Son at bedside.    MEDICATIONS  (STANDING):  atorvastatin 40 milliGRAM(s) Oral at bedtime  cefTRIAXone   IVPB 2 Gram(s) IV Intermittent every 24 hours  cefTRIAXone   IVPB      dextrose 5%. 1000 milliLiter(s) (50 mL/Hr) IV Continuous <Continuous>  dextrose 50% Injectable 12.5 Gram(s) IV Push once  dextrose 50% Injectable 25 Gram(s) IV Push once  dextrose 50% Injectable 25 Gram(s) IV Push once  docusate sodium 100 milliGRAM(s) Oral three times a day  insulin lispro (HumaLOG) corrective regimen sliding scale   SubCutaneous three times a day before meals  insulin lispro (HumaLOG) corrective regimen sliding scale   SubCutaneous at bedtime  lidocaine   Patch 1 Patch Transdermal daily  pantoprazole    Tablet 40 milliGRAM(s) Oral two times a day  senna 2 Tablet(s) Oral at bedtime  sodium chloride 0.9%. 1000 milliLiter(s) (100 mL/Hr) IV Continuous <Continuous>  sucralfate 1 Gram(s) Oral four times a day  vancomycin  IVPB 1250 milliGRAM(s) IV Intermittent every 12 hours    MEDICATIONS  (PRN):  acetaminophen   Tablet 650 milliGRAM(s) Oral every 6 hours PRN For Temp greater than 38 C (100.4 F)  acetaminophen   Tablet. 650 milliGRAM(s) Oral every 6 hours PRN Mild Pain (1 - 3)  dextrose Gel 1 Dose(s) Oral once PRN Blood Glucose LESS THAN 70 milliGRAM(s)/deciliter  glucagon  Injectable 1 milliGRAM(s) IntraMuscular once PRN Glucose LESS THAN 70 milligrams/deciliter  morphine  - Injectable 2 milliGRAM(s) IV Push every 4 hours PRN Severe Pain (7 - 10)  ondansetron Injectable 4 milliGRAM(s) IV Push every 6 hours PRN Nausea and/or Vomiting  oxyCODONE    IR 5 milliGRAM(s) Oral every 4 hours PRN Moderate Pain (4 - 6)      T(C): 37.6 (04-11-18 @ 08:15), Max: 37.6 (04-11-18 @ 05:45)  HR: 86 (04-11-18 @ 08:15) (86 - 102)  BP: 110/70 (04-11-18 @ 08:15) (110/70 - 138/87)  RR: 18 (04-11-18 @ 08:15) (17 - 18)  SpO2: 95% (04-11-18 @ 08:15) (95% - 100%)  CAPILLARY BLOOD GLUCOSE      POCT Blood Glucose.: 97 mg/dL (11 Apr 2018 06:05)  POCT Blood Glucose.: 133 mg/dL (10 Apr 2018 21:39)  POCT Blood Glucose.: 100 mg/dL (10 Apr 2018 17:58)    I&O's Summary    10 Apr 2018 07:01  -  11 Apr 2018 07:00  --------------------------------------------------------  IN: 1425 mL / OUT: 1700 mL / NET: -275 mL    11 Apr 2018 07:01  -  11 Apr 2018 13:06  --------------------------------------------------------  IN: 350 mL / OUT: 500 mL / NET: -150 mL    PHYSICAL EXAM:  GENERAL: no apparent distress, on room air  HEENT: sclera clear b/l, dressing C/D/I on right neck  CHEST/LUNG: Clear to auscultation bilaterally; No wheezing or crackles  HEART: s1/s2, no murmurs appreciated  ABDOMEN: Soft, Nontender, Nondistended; Bowel sounds present  EXTREMITIES:  2+ Peripheral Pulses, No clubbing, cyanosis, or edema  NEUROLOGY: awake, alert, responds to Qs appropriately, no gross focal deficits, moving all extremities, no sensory deficits    LABS:                        8.4    9.96  )-----------( 293      ( 11 Apr 2018 05:15 )             25.4     04-11    128<L>  |  95<L>  |  7   ----------------------------<  93  3.9   |  20<L>  |  0.74    Ca    7.1<L>      11 Apr 2018 05:15  Mg     1.6     04-11      PT/INR - ( 10 Apr 2018 09:10 )   PT: 14.4 SEC;   INR: 1.25          PTT - ( 10 Apr 2018 09:10 )  PTT:29.6 SEC          RADIOLOGY & ADDITIONAL TESTS: Patient is a 68y old  Female who presents with a chief complaint of Lumbar epidural abscess (08 Apr 2018 03:15)      SUBJECTIVE / OVERNIGHT EVENTS: No acute events overnight. : [id number]. No complaints of chest pain, SOB, N/V/D. Son at bedside.    MEDICATIONS  (STANDING):  atorvastatin 40 milliGRAM(s) Oral at bedtime  cefTRIAXone   IVPB 2 Gram(s) IV Intermittent every 24 hours  cefTRIAXone   IVPB      dextrose 5%. 1000 milliLiter(s) (50 mL/Hr) IV Continuous <Continuous>  dextrose 50% Injectable 12.5 Gram(s) IV Push once  dextrose 50% Injectable 25 Gram(s) IV Push once  dextrose 50% Injectable 25 Gram(s) IV Push once  docusate sodium 100 milliGRAM(s) Oral three times a day  insulin lispro (HumaLOG) corrective regimen sliding scale   SubCutaneous three times a day before meals  insulin lispro (HumaLOG) corrective regimen sliding scale   SubCutaneous at bedtime  lidocaine   Patch 1 Patch Transdermal daily  pantoprazole    Tablet 40 milliGRAM(s) Oral two times a day  senna 2 Tablet(s) Oral at bedtime  sodium chloride 0.9%. 1000 milliLiter(s) (100 mL/Hr) IV Continuous <Continuous>  sucralfate 1 Gram(s) Oral four times a day  vancomycin  IVPB 1250 milliGRAM(s) IV Intermittent every 12 hours    MEDICATIONS  (PRN):  acetaminophen   Tablet 650 milliGRAM(s) Oral every 6 hours PRN For Temp greater than 38 C (100.4 F)  acetaminophen   Tablet. 650 milliGRAM(s) Oral every 6 hours PRN Mild Pain (1 - 3)  dextrose Gel 1 Dose(s) Oral once PRN Blood Glucose LESS THAN 70 milliGRAM(s)/deciliter  glucagon  Injectable 1 milliGRAM(s) IntraMuscular once PRN Glucose LESS THAN 70 milligrams/deciliter  morphine  - Injectable 2 milliGRAM(s) IV Push every 4 hours PRN Severe Pain (7 - 10)  ondansetron Injectable 4 milliGRAM(s) IV Push every 6 hours PRN Nausea and/or Vomiting  oxyCODONE    IR 5 milliGRAM(s) Oral every 4 hours PRN Moderate Pain (4 - 6)      T(C): 37.6 (04-11-18 @ 08:15), Max: 37.6 (04-11-18 @ 05:45)  HR: 86 (04-11-18 @ 08:15) (86 - 102)  BP: 110/70 (04-11-18 @ 08:15) (110/70 - 138/87)  RR: 18 (04-11-18 @ 08:15) (17 - 18)  SpO2: 95% (04-11-18 @ 08:15) (95% - 100%)  CAPILLARY BLOOD GLUCOSE      POCT Blood Glucose.: 97 mg/dL (11 Apr 2018 06:05)  POCT Blood Glucose.: 133 mg/dL (10 Apr 2018 21:39)  POCT Blood Glucose.: 100 mg/dL (10 Apr 2018 17:58)    I&O's Summary    10 Apr 2018 07:01  -  11 Apr 2018 07:00  --------------------------------------------------------  IN: 1425 mL / OUT: 1700 mL / NET: -275 mL    11 Apr 2018 07:01  -  11 Apr 2018 13:06  --------------------------------------------------------  IN: 350 mL / OUT: 500 mL / NET: -150 mL    PHYSICAL EXAM:  GENERAL: no apparent distress, on room air  HEENT: sclera clear b/l, dressing C/D/I on right neck  CHEST/LUNG: Clear to auscultation bilaterally; No wheezing or crackles  HEART: s1/s2, no murmurs appreciated  ABDOMEN: Soft, Nontender, Nondistended; Bowel sounds present  EXTREMITIES:  No clubbing, cyanosis, or edema  NEUROLOGY: awake, alert, responds to Qs appropriately, no gross focal deficits, moving all extremities, no sensory deficits    LABS:                        8.4    9.96  )-----------( 293      ( 11 Apr 2018 05:15 )             25.4     04-11    128<L>  |  95<L>  |  7   ----------------------------<  93  3.9   |  20<L>  |  0.74    Ca    7.1<L>      11 Apr 2018 05:15  Mg     1.6     04-11      PT/INR - ( 10 Apr 2018 09:10 )   PT: 14.4 SEC;   INR: 1.25          PTT - ( 10 Apr 2018 09:10 )  PTT:29.6 SEC          RADIOLOGY & ADDITIONAL TESTS:

## 2018-04-11 NOTE — PROGRESS NOTE ADULT - PROBLEM SELECTOR PLAN 1
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 Per IR attending, abscesses not in the location or large enough to be drained  vanco/ CTX per ID recs, WBC resolved  4/6 blood cx with aggregatibacter, DERRICK tmw, likely will need PICC, repeat Bcx so far NGTD  HIV negative

## 2018-04-11 NOTE — DISCHARGE NOTE ADULT - PLAN OF CARE
Continue Antibiotics through t 5/20/18  You will need weekly labs, CBC ,CMP, ESR and CRP Taken off anticoagulation secondary to GI bleed.  - 4/10: s/p IVC filter -A1c-6.9. Continue Antibiotics through t 5/20/18  You will need weekly labs, CBC ,CMP, ESR and CRP  Patient to followup in ID clinic with Dr. Salmeron in 4 weeks. For appointments, can call [phone]. resolved s/p 4 U prbc with appropriate rise You received blood transfusion clearance of bacteria Continue Antibiotics through 5/20/18  You will need weekly labs, CBC ,CMP, ESR and CRP  Patient to followup in ID clinic with Dr. Salmeron in 4 weeks. For appointments, call [phone]. s/p 4 U prbc with appropriate rise, Hgb then remained stable  you need to repeat endoscopy in 4 weeks. Call office of Dr. Mahnaz Freire for appt  Endoscopy showed ulcer in stomach and first part of intestine. Do not take any Motrin, Ibuprofen, Naprosyn. You can take tylenol for pain You received blood transfusion and you received an IVC filter to prevent clots going to your lungs instead of continuing on a blood thinner. BP controlled stable H/H further follow up BP control Glucose control -A1c-6.9% continue Metformin

## 2018-04-11 NOTE — DISCHARGE NOTE ADULT - CARE PLAN
Principal Discharge DX:	Abscess in epidural space of lumbar spine  Secondary Diagnosis:	Acute gastric ulcer with hemorrhage  Secondary Diagnosis:	Anemia due to blood loss  Secondary Diagnosis:	Deep vein thrombosis (DVT) of femoral vein of both lower extremities, unspecified chronicity  Secondary Diagnosis:	Essential hypertension  Secondary Diagnosis:	Hyponatremia  Secondary Diagnosis:	Type 2 diabetes mellitus without complication Principal Discharge DX:	Abscess in epidural space of lumbar spine  Assessment and plan of treatment:	Continue Antibiotics through t 5/20/18  You will need weekly labs, CBC ,CMP, ESR and CRP  Secondary Diagnosis:	Acute gastric ulcer with hemorrhage  Secondary Diagnosis:	Anemia due to blood loss  Secondary Diagnosis:	Deep vein thrombosis (DVT) of femoral vein of both lower extremities, unspecified chronicity  Assessment and plan of treatment:	Taken off anticoagulation secondary to GI bleed.  - 4/10: s/p IVC filter  Secondary Diagnosis:	Essential hypertension  Secondary Diagnosis:	Hyponatremia  Secondary Diagnosis:	Type 2 diabetes mellitus without complication  Assessment and plan of treatment:	-A1c-6.9. Principal Discharge DX:	Abscess in epidural space of lumbar spine  Assessment and plan of treatment:	Continue Antibiotics through t 5/20/18  You will need weekly labs, CBC ,CMP, ESR and CRP  Patient to followup in ID clinic with Dr. Salmeron in 4 weeks. For appointments, can call [phone].  Secondary Diagnosis:	Acute gastric ulcer with hemorrhage  Goal:	resolved  Assessment and plan of treatment:	s/p 4 U prbc with appropriate rise  Secondary Diagnosis:	Anemia due to blood loss  Assessment and plan of treatment:	You received blood transfusion  Secondary Diagnosis:	Deep vein thrombosis (DVT) of femoral vein of both lower extremities, unspecified chronicity  Assessment and plan of treatment:	Taken off anticoagulation secondary to GI bleed.  - 4/10: s/p IVC filter  Secondary Diagnosis:	Essential hypertension  Secondary Diagnosis:	Hyponatremia  Secondary Diagnosis:	Type 2 diabetes mellitus without complication  Assessment and plan of treatment:	-A1c-6.9. Principal Discharge DX:	Abscess in epidural space of lumbar spine  Goal:	clearance of bacteria  Assessment and plan of treatment:	Continue Antibiotics through 5/20/18  You will need weekly labs, CBC ,CMP, ESR and CRP  Patient to followup in ID clinic with Dr. Salmeron in 4 weeks. For appointments, call [phone].  Secondary Diagnosis:	Acute gastric ulcer with hemorrhage  Goal:	resolved  Assessment and plan of treatment:	s/p 4 U prbc with appropriate rise, Hgb then remained stable  you need to repeat endoscopy in 4 weeks. Call office of Dr. Mahnaz Freire for appt  Endoscopy showed ulcer in stomach and first part of intestine. Do not take any Motrin, Ibuprofen, Naprosyn. You can take tylenol for pain  Secondary Diagnosis:	Anemia due to blood loss  Assessment and plan of treatment:	You received blood transfusion and you received an IVC filter to prevent clots going to your lungs instead of continuing on a blood thinner.  Secondary Diagnosis:	Deep vein thrombosis (DVT) of femoral vein of both lower extremities, unspecified chronicity  Assessment and plan of treatment:	Taken off anticoagulation secondary to GI bleed.  - 4/10: s/p IVC filter  Secondary Diagnosis:	Essential hypertension  Assessment and plan of treatment:	BP controlled  Secondary Diagnosis:	Hyponatremia  Assessment and plan of treatment:	resolved  Secondary Diagnosis:	Type 2 diabetes mellitus without complication  Assessment and plan of treatment:	-A1c-6.9. Principal Discharge DX:	Abscess in epidural space of lumbar spine  Goal:	clearance of bacteria  Assessment and plan of treatment:	Continue Antibiotics through 5/20/18  You will need weekly labs, CBC ,CMP, ESR and CRP  Patient to followup in ID clinic with Dr. Salmeron in 4 weeks. For appointments, call [phone].  Secondary Diagnosis:	Acute gastric ulcer with hemorrhage  Goal:	resolved  Assessment and plan of treatment:	s/p 4 U prbc with appropriate rise, Hgb then remained stable  you need to repeat endoscopy in 4 weeks. Call office of Dr. Mahnaz Freire for appt  Endoscopy showed ulcer in stomach and first part of intestine. Do not take any Motrin, Ibuprofen, Naprosyn. You can take tylenol for pain  Secondary Diagnosis:	Anemia due to blood loss  Goal:	stable H/H  Assessment and plan of treatment:	You received blood transfusion and you received an IVC filter to prevent clots going to your lungs instead of continuing on a blood thinner.  Secondary Diagnosis:	Deep vein thrombosis (DVT) of femoral vein of both lower extremities, unspecified chronicity  Goal:	further follow up  Assessment and plan of treatment:	Taken off anticoagulation secondary to GI bleed.  - 4/10: s/p IVC filter  Secondary Diagnosis:	Essential hypertension  Goal:	BP control  Assessment and plan of treatment:	BP controlled  Secondary Diagnosis:	Hyponatremia  Assessment and plan of treatment:	resolved  Secondary Diagnosis:	Type 2 diabetes mellitus without complication  Goal:	Glucose control  Assessment and plan of treatment:	-A1c-6.9% continue Metformin

## 2018-04-11 NOTE — PROGRESS NOTE ADULT - PROBLEM SELECTOR PLAN 2
+ hypercoagulable state, ? underlying malignancy  CT A/P done-showed thrombus in infrarenal IVC and b/l common iliac veins  - holding A/C, awaiting IVC filter today by IR + hypercoagulable state, ? underlying malignancy  s/p IVC filter placed by IR on 4/10 given recent GIB, no A/C  EGD biopsy from [facility] negative for malignancy  CT A/P done-showed thrombus in infrarenal IVC and b/l common iliac veins

## 2018-04-11 NOTE — DISCHARGE NOTE ADULT - SECONDARY DIAGNOSIS.
Acute gastric ulcer with hemorrhage Anemia due to blood loss Deep vein thrombosis (DVT) of femoral vein of both lower extremities, unspecified chronicity Essential hypertension Hyponatremia Type 2 diabetes mellitus without complication

## 2018-04-11 NOTE — DISCHARGE NOTE ADULT - MEDICATION SUMMARY - MEDICATIONS TO CHANGE
I will SWITCH the dose or number of times a day I take the medications listed below when I get home from the hospital:    pantoprazole 40 mg intravenous injection  --  intravenous

## 2018-04-11 NOTE — PROGRESS NOTE ADULT - SUBJECTIVE AND OBJECTIVE BOX
Vital Signs Last 24 Hrs  T(C): 37.6 (11 Apr 2018 08:15), Max: 37.6 (11 Apr 2018 05:45)  T(F): 99.6 (11 Apr 2018 08:15), Max: 99.6 (11 Apr 2018 05:45)  HR: 86 (11 Apr 2018 08:15) (86 - 102)  BP: 110/70 (11 Apr 2018 08:15) (110/70 - 138/87)  BP(mean): --  RR: 18 (11 Apr 2018 08:15) (17 - 18)  SpO2: 95% (11 Apr 2018 08:15) (95% - 100%)    I&O's Detail    10 Apr 2018 07:01  -  11 Apr 2018 07:00  --------------------------------------------------------  IN:    IV PiggyBack: 300 mL    sodium chloride 0.9%: 1125 mL  Total IN: 1425 mL    OUT:    Voided: 1700 mL  Total OUT: 1700 mL    Total NET: -275 mL                                8.4    9.96  )-----------( 293      ( 11 Apr 2018 05:15 )             25.4       04-11    128<L>  |  95<L>  |  7   ----------------------------<  93  3.9   |  20<L>  |  0.74    Ca    7.1<L>      11 Apr 2018 05:15  Mg     1.6     04-11        PT/INR - ( 10 Apr 2018 09:10 )   PT: 14.4 SEC;   INR: 1.25          PTT - ( 10 Apr 2018 09:10 )  PTT:29.6 SEC    biopsy results from Antral ulcer from 4/6 : benign      PLAN:    check new biopsy results from today  Continue antibiotics for abscess

## 2018-04-11 NOTE — PROGRESS NOTE ADULT - PROBLEM SELECTOR PLAN 4
H/H stable, s/p 4 U prbc with appropriate rise , NPO for EGD today to reeval gastric ulcer. Per surgeon, Dr. Adams, patient with severe GIB at [facility], recommends against A/C. Patient for IVC filter today for infrarenal and iliac thrombus  PPI IV BID  GI following H/H stable, s/p 4 U prbc with appropriate rise  EGD done 4/11 with Non-bleeding gastric ulcer with a clean ulcer base and one non-bleeding duodenal ulcer with a clean ulcer  base (Faraz Class III) which was benign appearing. F/U pathology  Switch to PO Protonix BID, Discussed with GI fellow

## 2018-04-11 NOTE — DISCHARGE NOTE ADULT - PATIENT PORTAL LINK FT
You can access the [facility] Patient Portal, offered by [facility], by registering with the following website: [email or web address]

## 2018-04-11 NOTE — DISCHARGE NOTE ADULT - HOSPITAL COURSE
69 yo F w/ hx HTN, DM p/w LBP x 2 weeks found to have epidural abscess on MRI  L1-L4 without focal deficits with possible discitis at L4-L5 complicated by acute GIB ( s/p transfusion 4 u prbc and 1 u plts) secondary to NSAID use EGD done showed concern for malignancy. CT A/P done  Multiloculated peripherally enhancing fluid collection tracking along the medial aspect of the left psoas muscle measuring 2.6 x 1.3 x 10.0 cm and infrarenal IVC thrombus and b/l common illiac thrombus.    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    -having black stools, no bright red blood per rectum  -s/p 4 U prbc with appropriate rise , currently HD stable  -GI following  -PPI BID  -EGD biopsy from [facility] negative for malignancy    Type 2 diabetes mellitus without complication, unspecified long term insulin use status.    -monitor FS QAC/HS  -ISS  -A1c-6.9.     Breast nodule.    8mm nodule in LT breast   -prior mammo 2016 neg   -consider US/outpt mammo.     Hyponatremia.    -initially thought to be secondary to HCTZ improved off HCTZ pt reports decreased PO intake past few days  -Salt tabs - D/C'd    Essential hypertension.   -TTE shows stage I diastolic dysfunction   -monitor BP  -BP meds on hold.    DVT (deep venous thrombosis).    -CT A/P done-showed thrombus in infrarenal IVC and b/l common illiac veins  -hold AC,   - 4/10: s/p IVC filter 68 F  with DM2, HTN, HLD - gastric ulcer presented to [facility] 4/5/18 for fever and back pain, was found to have L1-L4 left sided posterior epidural abscess with discitis and osteomyelitis. Course complicated by acute GIB ( s/p transfusion 4 u prbc and 1 u plts) and finding of infrarenal IVC thrombus and b/l common illiac thrombus s/p IVC filter. Then found to have acute PE and started on heparin drip but developed melena and heparin drip dced and remained hemodynamically stable. Patient received PICC line to complete 6 weeks total of antibiotics until 5/20/18. PT recs: rehab

## 2018-04-11 NOTE — DISCHARGE NOTE ADULT - MEDICATION SUMMARY - MEDICATIONS TO STOP TAKING
I will STOP taking the medications listed below when I get home from the hospital:    cefepime    vancomycin

## 2018-04-11 NOTE — DISCHARGE NOTE ADULT - MEDICATION SUMMARY - MEDICATIONS TO TAKE
I will START or STAY ON the medications listed below when I get home from the hospital:    acetaminophen 325 mg oral tablet  -- 2 tab(s) by mouth every 6 hours, As needed, For Temp greater   -- Indication: For Prophylactic measure    atorvastatin 20 mg oral tablet  -- 2 tab(s) by mouth once a day (at bedtime)  -- Indication: For HLD    cefTRIAXone  -- 2 gram(s) intravenous once a day   to complete 5/20/18. weekly CBC/ CMP/ ESR/ CRP.  -- Indication: For Psoas abscess    lidocaine 5% topical film  --  on skin   -- Indication: For Pain control     sucralfate 1 g oral tablet  -- 1 tab(s) by mouth 4 times a day  -- Indication: For Acute gastric ulcer with hemorrhage    pantoprazole 40 mg oral delayed release tablet  -- 1 tab(s) by mouth once a day (before a meal)  -- Indication: For Acute gastric ulcer with hemorrhage

## 2018-04-11 NOTE — PROGRESS NOTE ADULT - SUBJECTIVE AND OBJECTIVE BOX
CC: Patient is a 68y old  Female who presents with a chief complaint of Lumbar epidural abscess    Interval History/ROS: s/p EGD today. Has no new complaints. Denies fever, chills, chest pain, SOB, N/V/D/C.     Allergies  No Known Allergies    ANTIMICROBIALS:  cefTRIAXone   IVPB 2 every 24 hours  cefTRIAXone   IVPB    vancomycin  IVPB 1250 every 12 hours    PE:    Vital Signs Last 24 Hrs  T(C): 37.6 (11 Apr 2018 08:15), Max: 37.6 (11 Apr 2018 05:45)  T(F): 99.6 (11 Apr 2018 08:15), Max: 99.6 (11 Apr 2018 05:45)  HR: 86 (11 Apr 2018 08:15) (86 - 102)  BP: 110/70 (11 Apr 2018 08:15) (110/70 - 138/87)  BP(mean): --  RR: 18 (11 Apr 2018 08:15) (17 - 18)  SpO2: 95% (11 Apr 2018 08:15) (95% - 100%)    Gen: AOx3, NAD, non-toxic, pleasant  CV: S1+S2 normal, no murmurs  Resp: Clear bilat, no resp distress  Abd: Soft, nontender, +BS  Ext: No LE edema, no wounds  : No Pretty  IV/Skin: No thrombophlebitis  Neuro: no focal deficits    LABS:                          8.4    9.96  )-----------( 293      ( 11 Apr 2018 05:15 )             25.4       04-11    128<L>  |  95<L>  |  7   ----------------------------<  93  3.9   |  20<L>  |  0.74    Ca    7.1<L>      11 Apr 2018 05:15  Mg     1.6     04-11            MICROBIOLOGY:  Vancomycin Level, Trough: 15.0 ug/mL (04-11-18 @ 05:15)  v  BLOOD  04-08-18 --  --  --      .Blood Blood  04-06-18   Growth in aerobic bottle: Aggregatibacter aphrophilus  Susceptibilites not performed.  --  --      .Blood Blood  04-06-18   Growth in aerobic bottle: Aggregatibacter aphrophilus  "Susceptibilities not performed"  "Due to technical problems, Proteus sp. will Not be reported as part of  the BCID panel until further notice"  ***Blood Panel PCR results on this specimen are available  approximately 3 hours after the Gram stain result.***  Gram stain, PCR, and/or culture results may not always  correspond due to difference in methodologies.  ************************************************************  This PCR assay was performed using [employer]  The following targets are tested for: Enterococcus,  vancomycin resistant enterococci, Listeria monocytogenes,  coagulase negative staphylococci, S. aureus,  methicillin resistant S. aureus, Streptococcus agalactiae  (Group B), S. pneumoniae, S. pyogenes (Group A),  Acinetobacter baumannii, Enterobacter cloacae, E. coli,  Klebsiella oxytoca, K. pneumoniae, Proteus sp.,  Serratia marcescens, Haemophilus influenzae,  Neisseria meningitidis, Pseudomonas aeruginosa,Candida  albicans, C. glabrata, C krusei, C parapsilosis,  C. tropicalis and the KPC resistance gene.  --  Blood Culture PCR      .Blood Blood  04-04-18   No growth at 5 days.  --  --      .Urine Clean Catch (Midstream)  04-04-18   10,000 - 49,000 CFU/mL Enterococcus faecalis  --  Enterococcus faecalis      .Urine Clean Catch (Midstream)  03-25-18   No growth  --  --    RADIOLOGY:    No new images.

## 2018-04-12 LAB
APTT BLD: 32.2 SEC — SIGNIFICANT CHANGE UP (ref 27.5–37.4)
BASOPHILS # BLD AUTO: 0.05 K/UL — SIGNIFICANT CHANGE UP (ref 0–0.2)
BASOPHILS NFR BLD AUTO: 0.6 % — SIGNIFICANT CHANGE UP (ref 0–2)
BUN SERPL-MCNC: 4 MG/DL — LOW (ref 7–23)
BUN SERPL-MCNC: 4 MG/DL — LOW (ref 7–23)
CALCIUM SERPL-MCNC: 7.2 MG/DL — LOW (ref 8.4–10.5)
CALCIUM SERPL-MCNC: 7.2 MG/DL — LOW (ref 8.4–10.5)
CHLORIDE SERPL-SCNC: 94 MMOL/L — LOW (ref 98–107)
CHLORIDE SERPL-SCNC: 94 MMOL/L — LOW (ref 98–107)
CO2 SERPL-SCNC: 21 MMOL/L — LOW (ref 22–31)
CO2 SERPL-SCNC: 21 MMOL/L — LOW (ref 22–31)
CREAT SERPL-MCNC: 0.69 MG/DL — SIGNIFICANT CHANGE UP (ref 0.5–1.3)
CREAT SERPL-MCNC: 0.69 MG/DL — SIGNIFICANT CHANGE UP (ref 0.5–1.3)
EOSINOPHIL # BLD AUTO: 0.08 K/UL — SIGNIFICANT CHANGE UP (ref 0–0.5)
EOSINOPHIL NFR BLD AUTO: 1 % — SIGNIFICANT CHANGE UP (ref 0–6)
GLUCOSE SERPL-MCNC: 116 MG/DL — HIGH (ref 70–99)
GLUCOSE SERPL-MCNC: 116 MG/DL — HIGH (ref 70–99)
HCT VFR BLD CALC: 27.1 % — LOW (ref 34.5–45)
HCT VFR BLD CALC: 31.6 % — LOW (ref 34.5–45)
HGB BLD-MCNC: 10.2 G/DL — LOW (ref 11.5–15.5)
HGB BLD-MCNC: 8.9 G/DL — LOW (ref 11.5–15.5)
IMM GRANULOCYTES # BLD AUTO: 0.07 # — SIGNIFICANT CHANGE UP
IMM GRANULOCYTES NFR BLD AUTO: 0.9 % — SIGNIFICANT CHANGE UP (ref 0–1.5)
LYMPHOCYTES # BLD AUTO: 1.69 K/UL — SIGNIFICANT CHANGE UP (ref 1–3.3)
LYMPHOCYTES # BLD AUTO: 21.7 % — SIGNIFICANT CHANGE UP (ref 13–44)
MAGNESIUM SERPL-MCNC: 1.5 MG/DL — LOW (ref 1.6–2.6)
MCHC RBC-ENTMCNC: 28.9 PG — SIGNIFICANT CHANGE UP (ref 27–34)
MCHC RBC-ENTMCNC: 29.1 PG — SIGNIFICANT CHANGE UP (ref 27–34)
MCHC RBC-ENTMCNC: 32.3 % — SIGNIFICANT CHANGE UP (ref 32–36)
MCHC RBC-ENTMCNC: 32.8 % — SIGNIFICANT CHANGE UP (ref 32–36)
MCV RBC AUTO: 88.6 FL — SIGNIFICANT CHANGE UP (ref 80–100)
MCV RBC AUTO: 89.5 FL — SIGNIFICANT CHANGE UP (ref 80–100)
MONOCYTES # BLD AUTO: 0.98 K/UL — HIGH (ref 0–0.9)
MONOCYTES NFR BLD AUTO: 12.6 % — SIGNIFICANT CHANGE UP (ref 2–14)
NEUTROPHILS # BLD AUTO: 4.92 K/UL — SIGNIFICANT CHANGE UP (ref 1.8–7.4)
NEUTROPHILS NFR BLD AUTO: 63.2 % — SIGNIFICANT CHANGE UP (ref 43–77)
NRBC # FLD: 0 — SIGNIFICANT CHANGE UP
NRBC # FLD: 0 — SIGNIFICANT CHANGE UP
PHOSPHATE SERPL-MCNC: 2.1 MG/DL — LOW (ref 2.5–4.5)
PLATELET # BLD AUTO: 284 K/UL — SIGNIFICANT CHANGE UP (ref 150–400)
PLATELET # BLD AUTO: 292 K/UL — SIGNIFICANT CHANGE UP (ref 150–400)
PMV BLD: 8.8 FL — SIGNIFICANT CHANGE UP (ref 7–13)
PMV BLD: 9.1 FL — SIGNIFICANT CHANGE UP (ref 7–13)
POTASSIUM SERPL-MCNC: 3.6 MMOL/L — SIGNIFICANT CHANGE UP (ref 3.5–5.3)
POTASSIUM SERPL-MCNC: 3.6 MMOL/L — SIGNIFICANT CHANGE UP (ref 3.5–5.3)
POTASSIUM SERPL-SCNC: 3.6 MMOL/L — SIGNIFICANT CHANGE UP (ref 3.5–5.3)
POTASSIUM SERPL-SCNC: 3.6 MMOL/L — SIGNIFICANT CHANGE UP (ref 3.5–5.3)
RBC # BLD: 3.06 M/UL — LOW (ref 3.8–5.2)
RBC # BLD: 3.53 M/UL — LOW (ref 3.8–5.2)
RBC # FLD: 14.5 % — SIGNIFICANT CHANGE UP (ref 10.3–14.5)
RBC # FLD: 14.6 % — HIGH (ref 10.3–14.5)
SODIUM SERPL-SCNC: 129 MMOL/L — LOW (ref 135–145)
SODIUM SERPL-SCNC: 129 MMOL/L — LOW (ref 135–145)
SPECIMEN SOURCE: SIGNIFICANT CHANGE UP
SPECIMEN SOURCE: SIGNIFICANT CHANGE UP
VANCOMYCIN TROUGH SERPL-MCNC: 15.4 UG/ML — SIGNIFICANT CHANGE UP (ref 10–20)
WBC # BLD: 7.79 K/UL — SIGNIFICANT CHANGE UP (ref 3.8–10.5)
WBC # BLD: 9.37 K/UL — SIGNIFICANT CHANGE UP (ref 3.8–10.5)
WBC # FLD AUTO: 7.79 K/UL — SIGNIFICANT CHANGE UP (ref 3.8–10.5)
WBC # FLD AUTO: 9.37 K/UL — SIGNIFICANT CHANGE UP (ref 3.8–10.5)

## 2018-04-12 PROCEDURE — 93312 ECHO TRANSESOPHAGEAL: CPT | Mod: 26

## 2018-04-12 PROCEDURE — 93325 DOPPLER ECHO COLOR FLOW MAPG: CPT | Mod: 26,GC

## 2018-04-12 PROCEDURE — 99232 SBSQ HOSP IP/OBS MODERATE 35: CPT

## 2018-04-12 PROCEDURE — 76376 3D RENDER W/INTRP POSTPROCES: CPT | Mod: 26

## 2018-04-12 PROCEDURE — 99233 SBSQ HOSP IP/OBS HIGH 50: CPT

## 2018-04-12 PROCEDURE — 71275 CT ANGIOGRAPHY CHEST: CPT | Mod: 26

## 2018-04-12 PROCEDURE — 93320 DOPPLER ECHO COMPLETE: CPT | Mod: 26,GC

## 2018-04-12 RX ORDER — HEPARIN SODIUM 5000 [USP'U]/ML
2500 INJECTION INTRAVENOUS; SUBCUTANEOUS EVERY 6 HOURS
Qty: 0 | Refills: 0 | Status: DISCONTINUED | OUTPATIENT
Start: 2018-04-12 | End: 2018-04-14

## 2018-04-12 RX ORDER — SODIUM CHLORIDE 9 MG/ML
1000 INJECTION, SOLUTION INTRAVENOUS
Qty: 0 | Refills: 0 | Status: DISCONTINUED | OUTPATIENT
Start: 2018-04-12 | End: 2018-04-13

## 2018-04-12 RX ORDER — POTASSIUM PHOSPHATE, MONOBASIC POTASSIUM PHOSPHATE, DIBASIC 236; 224 MG/ML; MG/ML
15 INJECTION, SOLUTION INTRAVENOUS ONCE
Qty: 0 | Refills: 0 | Status: COMPLETED | OUTPATIENT
Start: 2018-04-12 | End: 2018-04-12

## 2018-04-12 RX ORDER — MAGNESIUM SULFATE 500 MG/ML
1 VIAL (ML) INJECTION ONCE
Qty: 0 | Refills: 0 | Status: COMPLETED | OUTPATIENT
Start: 2018-04-12 | End: 2018-04-12

## 2018-04-12 RX ORDER — HEPARIN SODIUM 5000 [USP'U]/ML
INJECTION INTRAVENOUS; SUBCUTANEOUS
Qty: 25000 | Refills: 0 | Status: DISCONTINUED | OUTPATIENT
Start: 2018-04-12 | End: 2018-04-14

## 2018-04-12 RX ORDER — HEPARIN SODIUM 5000 [USP'U]/ML
5000 INJECTION INTRAVENOUS; SUBCUTANEOUS EVERY 6 HOURS
Qty: 0 | Refills: 0 | Status: DISCONTINUED | OUTPATIENT
Start: 2018-04-12 | End: 2018-04-14

## 2018-04-12 RX ORDER — OXYCODONE HYDROCHLORIDE 5 MG/1
5 TABLET ORAL EVERY 4 HOURS
Qty: 0 | Refills: 0 | Status: DISCONTINUED | OUTPATIENT
Start: 2018-04-12 | End: 2018-04-17

## 2018-04-12 RX ORDER — MORPHINE SULFATE 50 MG/1
2 CAPSULE, EXTENDED RELEASE ORAL EVERY 4 HOURS
Qty: 0 | Refills: 0 | Status: DISCONTINUED | OUTPATIENT
Start: 2018-04-12 | End: 2018-04-17

## 2018-04-12 RX ADMIN — Medication 1 GRAM(S): at 05:11

## 2018-04-12 RX ADMIN — ATORVASTATIN CALCIUM 40 MILLIGRAM(S): 80 TABLET, FILM COATED ORAL at 21:55

## 2018-04-12 RX ADMIN — HEPARIN SODIUM 1100 UNIT(S)/HR: 5000 INJECTION INTRAVENOUS; SUBCUTANEOUS at 21:24

## 2018-04-12 RX ADMIN — LIDOCAINE 1 PATCH: 4 CREAM TOPICAL at 23:20

## 2018-04-12 RX ADMIN — SODIUM CHLORIDE 75 MILLILITER(S): 9 INJECTION, SOLUTION INTRAVENOUS at 20:48

## 2018-04-12 RX ADMIN — Medication 100 MILLIGRAM(S): at 01:22

## 2018-04-12 RX ADMIN — Medication 100 MILLIGRAM(S): at 05:11

## 2018-04-12 RX ADMIN — CEFTRIAXONE 100 GRAM(S): 500 INJECTION, POWDER, FOR SOLUTION INTRAMUSCULAR; INTRAVENOUS at 11:25

## 2018-04-12 RX ADMIN — Medication 1 GRAM(S): at 11:28

## 2018-04-12 RX ADMIN — Medication 1 GRAM(S): at 19:14

## 2018-04-12 RX ADMIN — Medication 100 GRAM(S): at 09:24

## 2018-04-12 RX ADMIN — SODIUM CHLORIDE 100 MILLILITER(S): 9 INJECTION INTRAMUSCULAR; INTRAVENOUS; SUBCUTANEOUS at 05:13

## 2018-04-12 RX ADMIN — SODIUM CHLORIDE 75 MILLILITER(S): 9 INJECTION, SOLUTION INTRAVENOUS at 10:20

## 2018-04-12 RX ADMIN — Medication 166.67 MILLIGRAM(S): at 05:21

## 2018-04-12 RX ADMIN — LIDOCAINE 1 PATCH: 4 CREAM TOPICAL at 11:27

## 2018-04-12 RX ADMIN — POTASSIUM PHOSPHATE, MONOBASIC POTASSIUM PHOSPHATE, DIBASIC 62.5 MILLIMOLE(S): 236; 224 INJECTION, SOLUTION INTRAVENOUS at 09:24

## 2018-04-12 RX ADMIN — Medication 166.67 MILLIGRAM(S): at 20:48

## 2018-04-12 RX ADMIN — Medication 1: at 07:04

## 2018-04-12 RX ADMIN — PANTOPRAZOLE SODIUM 40 MILLIGRAM(S): 20 TABLET, DELAYED RELEASE ORAL at 05:21

## 2018-04-12 RX ADMIN — PANTOPRAZOLE SODIUM 40 MILLIGRAM(S): 20 TABLET, DELAYED RELEASE ORAL at 19:14

## 2018-04-12 NOTE — PROGRESS NOTE ADULT - PROBLEM SELECTOR PLAN 3
improving, initially thought to be secondary to HCTZ improved off HCTZ   Low serum osm, suspect hypotonic hypovolemic hyponatremia  Switch to LR given decreasing bicarb, TSH WNL, supplement Mg

## 2018-04-12 NOTE — PROGRESS NOTE ADULT - PROBLEM SELECTOR PLAN 4
H/H stable, s/p 4 U prbc with appropriate rise  EGD done 4/11 with Non-bleeding gastric ulcer with a clean ulcer base and one non-bleeding duodenal ulcer with a clean ulcer  base (Faraz Class III) which was benign appearing. F/U pathology  Switch to PO Protonix BID, Discussed with GI fellow

## 2018-04-12 NOTE — PROGRESS NOTE ADULT - ASSESSMENT
68 f with DM2, HTN, HLD - gastric ulcer presented to [facility] 4/5/18 for fever and back pain, was found to have L1-L4 left sided posterior epidural abscess with discitis and osteomyelitis.   urine cx: E. faecalis  pt received vanco and zosyn then switched to Imipenem as patient was febrile, then transferred to [facility] for abscess drainage, here abd CT showed also psoas abscess with extensive DVT in b/l iliac and infrarenal IVC  pt is from , denied any TB exposure   blood cx with aggregatibacter, now clear   HIV negative    Aggrigatebacter bacteremia, Left Epidural L1-L4 and psoas abscess with osteomyelitis and discitis,   extensive DVT in b/l iliac veins and infrarenal IVC  GI bleed due to gastric ulcer  DERRICK with no vegetation but a large mobile mass in pulmonary artery protruding to the right pulm artery    neurosurgery and IR stated that will not drain the psoas or epidural abscesses  f/u quantiferon   repeat blood cultures  DC vanco  c/w ceftriaxone 2 g q d  can place a picc line and will do 6 weeks of ceftriaxone from the negative blood cx

## 2018-04-12 NOTE — PROGRESS NOTE ADULT - SUBJECTIVE AND OBJECTIVE BOX
Follow Up:  epidural abscess, psoas abscess, bacteremia    Interval History: pt afebrile now, WBC normalized, the abscesses were not drained by IR either    ROS:      All other systems negative    Constitutional: no fever, no chills  Head: no trauma  Eyes: no vision changes, no eye pain  ENT:  no vision changes, no sore throat, no rhinorrhea  Cardiovascular:  no chest pain, no palpitation  Respiratory:  no SOB, no cough  GI:  +abd pain, no vomiting, no diarrhea  urinary: no dysuria, no hematuria, no flank pain  musculoskeletal: + back pain,  no joint pain, no joint swelling  skin:  no rash  neurology:  no headache, no seizure, no change in mental status  psych: no anxiety, no depression         Allergies  No Known Allergies        ANTIMICROBIALS:  cefTRIAXone   IVPB 2 every 24 hours  cefTRIAXone   IVPB    vancomycin  IVPB 1250 every 12 hours      OTHER MEDS:  acetaminophen   Tablet 650 milliGRAM(s) Oral every 6 hours PRN  acetaminophen   Tablet. 650 milliGRAM(s) Oral every 6 hours PRN  atorvastatin 40 milliGRAM(s) Oral at bedtime  dextrose 5%. 1000 milliLiter(s) IV Continuous <Continuous>  dextrose 50% Injectable 12.5 Gram(s) IV Push once  dextrose 50% Injectable 25 Gram(s) IV Push once  dextrose 50% Injectable 25 Gram(s) IV Push once  dextrose Gel 1 Dose(s) Oral once PRN  docusate sodium 100 milliGRAM(s) Oral three times a day  glucagon  Injectable 1 milliGRAM(s) IntraMuscular once PRN  insulin lispro (HumaLOG) corrective regimen sliding scale   SubCutaneous three times a day before meals  insulin lispro (HumaLOG) corrective regimen sliding scale   SubCutaneous at bedtime  lactated ringers. 1000 milliLiter(s) IV Continuous <Continuous>  lidocaine   Patch 1 Patch Transdermal daily  morphine  - Injectable 2 milliGRAM(s) IV Push every 4 hours PRN  ondansetron Injectable 4 milliGRAM(s) IV Push every 6 hours PRN  oxyCODONE    IR 5 milliGRAM(s) Oral every 4 hours PRN  pantoprazole    Tablet 40 milliGRAM(s) Oral two times a day  senna 2 Tablet(s) Oral at bedtime  sucralfate 1 Gram(s) Oral four times a day      Vital Signs Last 24 Hrs  T(C): 36.8 (12 Apr 2018 10:30), Max: 36.9 (12 Apr 2018 01:19)  T(F): 98.2 (12 Apr 2018 10:30), Max: 98.4 (12 Apr 2018 01:19)  HR: 94 (12 Apr 2018 10:30) (86 - 94)  BP: 131/80 (12 Apr 2018 10:30) (130/82 - 144/91)  BP(mean): --  RR: 18 (12 Apr 2018 10:30) (17 - 18)  SpO2: 95% (12 Apr 2018 10:30) (95% - 98%)    Physical Exam:  General:    NAD,  non toxic  Head: atraumatic, normocephalic  Eye: normal sclera and conjunctiva  ENT:    no oropharyngeal lesions,   no LAD,   neck supple  Cardio:     regular S1, S2,  no murmur  Respiratory:    clear b/l,    no wheezing  abd:     soft,   BS +,  slight left tenderness,     :   no CVAT,  no suprapubic tenderness,   no  mcclendon  Musculoskeletal:   spine tenderness on the lumbar area  vascular: no lines, normal pulses  Skin:    no rash  Neurologic:     no focal deficit  psych: normal affect, no suicidal ideation                          8.9    7.79  )-----------( 284      ( 12 Apr 2018 05:17 )             27.1       04-12    129<L>  |  94<L>  |  4<L>  ----------------------------<  116<H>  3.6   |  21<L>  |  0.69    Ca    7.2<L>      12 Apr 2018 05:17  Phos  2.1     04-12  Mg     1.5     04-12            MICROBIOLOGY:  Vancomycin Level, Trough: 15.4 ug/mL (04-12-18 @ 05:17)  v  BLOOD  04-11-18 --  --  --      BLOOD  04-08-18 --  --  --      .Blood Blood  04-06-18   Growth in aerobic bottle: Aggregatibacter aphrophilus  Susceptibilites not performed.  --  --      .Blood Blood  04-06-18   Growth in aerobic bottle: Aggregatibacter aphrophilus  "Susceptibilities not performed"  "Due to technical problems, Proteus sp. will Not be reported as part of  the BCID panel until further notice"  ***Blood Panel PCR results on this specimen are available  approximately 3 hours after the Gram stain result.***  Gram stain, PCR, and/or culture results may not always  correspond due to difference in methodologies.  ************************************************************  This PCR assay was performed using [employer].  The following targets are tested for: Enterococcus,  vancomycin resistant enterococci, Listeria monocytogenes,  coagulase negative staphylococci, S. aureus,  methicillin resistant S. aureus, Streptococcus agalactiae  (Group B), S. pneumoniae, S. pyogenes (Group A),  Acinetobacter baumannii, Enterobacter cloacae, E. coli,  Klebsiella oxytoca, K. pneumoniae, Proteus sp.,  Serratia marcescens, Haemophilus influenzae,  Neisseria meningitidis, Pseudomonas aeruginosa,Candida  albicans, C. glabrata, C krusei, C parapsilosis,  C. tropicalis and the KPC resistance gene.  --  Blood Culture PCR      .Blood Blood  04-04-18   No growth at 5 days.  --  --      .Urine Clean Catch (Midstream)  04-04-18   10,000 - 49,000 CFU/mL Enterococcus faecalis  --  Enterococcus faecalis      .Urine Clean Catch (Midstream)  03-25-18   No growth  --  --                RADIOLOGY:  < from: CT Chest w/wo IV Cont (04.09.18 @ 09:30) >  IMPRESSION: Small left pleural effusion with bilateral lower lobe areas   of dependent atelectasis.    2 tiny bilateral nonspecific pulmonary nodules as above. Follow-up can be   performed for further evaluation.    8 mm nodule within the peripheral aspect of the right breast as above.        < from: CT Abdomen and Pelvis w/ IV Cont (04.08.18 @ 12:00) >    IMPRESSION:   Multiloculated peripherally enhancing fluid collection tracking down the   left psoas muscle, likely an abscess.    Thrombus within the infrarenal IVC and bilateral common iliac veins.    Limited evaluation of the stomach secondary to underdistention/ lack of   oral contrast.     8 mm right outer breast nodule, for which correlation with mammography is   recommended.    < from: MR Lumbar Spine w/ IV Cont (04.05.18 @ 20:12) >  IMPRESSION:  Large epidural fluid collection suggestive of an abscess in the lumbar   spine from lower L1 through mid L4 levels, with mass effect on the thecal   sac as described. Adjacent left paravertebral soft tissue thickening and   inflammatory signal.    Findings suggests discitis and osteomyelitis of L4-L5. Possible small   anterior epidural abscess at L4-L5 disc level.    Moderate right facet joint effusion at L4-L5 with extra-articular   extension the paravertebral soft tissues demonstrates peripheral   enhancement; an infection/abscess of the right L4-L5 facet joint is not   excluded.       DERRICK w/o TTE (w/3D Echo) (04.12.18 @ 14:05) >  CONCLUSIONS:  1. Mitral annular calcification, otherwise normal mitral  valve.  No vegetations seen in association with the mitral  valve. Minimal mitral regurgitation.  2. Calcified trileaflet aortic valve with normal opening.  No vegetations seen in association with the aortic valve.  No aortic valve regurgitation seen.  3. Normal aortic root.  Mild non-mobile atherosclerotic  plaque in the aortic arch and descending thoracic aorta.  4. Normal left atrium.  No left atrium or left atrial  appendage thrombus.  5. Normal left ventricular systolic function. No segmental  wall motion abnormalities.  6. Normal right ventricular size and function.  7. Contrast injection demonstrates no evidence of a patent  foramen ovale.  8. A large (about  1.5 cm X 0.4 cm, but this is likely an  underestimate), mass is visualized in what appears to be a  branch of the right main pulmonary artery.  A highly mobile  extension of this mass protrudes into the right main  pulmonary artery.  Although the nature of this mass is  uncertain, it may represent thrombus or tumour.  No valvular vegetations were identified.  However, a mobile  mass was seen in the right main pulmonary artery (see no. 8  above).

## 2018-04-12 NOTE — PROGRESS NOTE ADULT - PROBLEM SELECTOR PLAN 1
1.  D/W Dr. Ny- no plan for laminectomy for drainage- patient neuro improving  2. Neuro checks q 4 hours

## 2018-04-12 NOTE — CHART NOTE - NSCHARTNOTEFT_GEN_A_CORE
Call received from radiology that CT angio showed acute PE within the posterior segmental basal artery of the left lower lobe. spoke with attending Ngozi Carrasco regarding the CT angio finding . . As per Attending start on heparin drip , with no bolus .Heparin drip ordered . will continue monitor. Call received from radiology that CT angio showed acute PE within the posterior segmental basal artery of the left lower lobe. spoke to attending Ngozi Carrasco regarding the CT angio finding . As per attending start on heparin drip , with no bolus .Heparin drip ordered . will continue monitor.

## 2018-04-12 NOTE — PROGRESS NOTE ADULT - ATTENDING COMMENTS
daughter: Ian: [phone]  sister: Dorys: [phone]    Dispo: pending DERRICK, improvement in Na, Abx course by ID,  PICC placement  Discussed with daughter, Dunia at bedside

## 2018-04-12 NOTE — CHART NOTE - NSCHARTNOTEFT_GEN_A_CORE
DERRICK done today revealed no vegetations but did show alarge mass in branch of the right main pulmonary artery.  A highly mobile extension of this mass protrudes into the right main pulmonary artery. May represent thrombus or tumour.  Reviewed recent CT chest done 3 days ago with radiologist, Dr. Farrell who reviewed with another radiologist and with echocardiologist and thinks this may be a new PE, unclear if this was embolic from recent IVC filter placement.   Spoke to Dr. Rocha, GI fellow who thinks risk of rebleeding is low at this time in case patient does need A/C. CTA Angio ordered. If proven PE, will start heparin drip and monitor for bleeding. Currently patient is hemodynamically stable and saturating well on room air.  Updated surgeon, Dr. Luis M Adams as well.  Discussed with GRETTA Bhat.    Karolina Carrasco D.O  Hospitalist  Pager [phone]

## 2018-04-12 NOTE — PROGRESS NOTE ADULT - PROBLEM SELECTOR PLAN 2
+ hypercoagulable state, ? underlying malignancy  CT A/P done-showed thrombus in infrarenal IVC and b/l common iliac veins  s/p IVC filter placed by IR on 4/10 given recent GIB, no A/C  EGD biopsy from [facility] negative for malignancy

## 2018-04-12 NOTE — PROGRESS NOTE ADULT - SUBJECTIVE AND OBJECTIVE BOX
Patient is a 68y old  Female who presents with a chief complaint of Lumbar epidural abscess (11 Apr 2018 18:09)      SUBJECTIVE / OVERNIGHT EVENTS: No acute events overnight. Has not eaten yet today, NPO for DERRICK. No chest pain, SOB but reports a dry cough intermittently. No N/V/D. Rash of LUE improved and denies itching    MEDICATIONS  (STANDING):  atorvastatin 40 milliGRAM(s) Oral at bedtime  cefTRIAXone   IVPB 2 Gram(s) IV Intermittent every 24 hours  cefTRIAXone   IVPB      dextrose 5%. 1000 milliLiter(s) (50 mL/Hr) IV Continuous <Continuous>  dextrose 50% Injectable 12.5 Gram(s) IV Push once  dextrose 50% Injectable 25 Gram(s) IV Push once  dextrose 50% Injectable 25 Gram(s) IV Push once  docusate sodium 100 milliGRAM(s) Oral three times a day  insulin lispro (HumaLOG) corrective regimen sliding scale   SubCutaneous three times a day before meals  insulin lispro (HumaLOG) corrective regimen sliding scale   SubCutaneous at bedtime  lactated ringers. 1000 milliLiter(s) (75 mL/Hr) IV Continuous <Continuous>  lidocaine   Patch 1 Patch Transdermal daily  pantoprazole    Tablet 40 milliGRAM(s) Oral two times a day  senna 2 Tablet(s) Oral at bedtime  sucralfate 1 Gram(s) Oral four times a day  vancomycin  IVPB 1250 milliGRAM(s) IV Intermittent every 12 hours    MEDICATIONS  (PRN):  acetaminophen   Tablet 650 milliGRAM(s) Oral every 6 hours PRN For Temp greater than 38 C (100.4 F)  acetaminophen   Tablet. 650 milliGRAM(s) Oral every 6 hours PRN Mild Pain (1 - 3)  dextrose Gel 1 Dose(s) Oral once PRN Blood Glucose LESS THAN 70 milliGRAM(s)/deciliter  glucagon  Injectable 1 milliGRAM(s) IntraMuscular once PRN Glucose LESS THAN 70 milligrams/deciliter  morphine  - Injectable 2 milliGRAM(s) IV Push every 4 hours PRN Severe Pain (7 - 10)  ondansetron Injectable 4 milliGRAM(s) IV Push every 6 hours PRN Nausea and/or Vomiting  oxyCODONE    IR 5 milliGRAM(s) Oral every 4 hours PRN Moderate Pain (4 - 6)      T(C): 36.8 (04-12-18 @ 10:30), Max: 36.9 (04-12-18 @ 01:19)  HR: 94 (04-12-18 @ 10:30) (86 - 94)  BP: 131/80 (04-12-18 @ 10:30) (130/82 - 144/91)  RR: 18 (04-12-18 @ 10:30) (17 - 18)  SpO2: 95% (04-12-18 @ 10:30) (95% - 98%)  CAPILLARY BLOOD GLUCOSE      POCT Blood Glucose.: 120 mg/dL (12 Apr 2018 09:38)  POCT Blood Glucose.: 153 mg/dL (12 Apr 2018 06:20)  POCT Blood Glucose.: 130 mg/dL (11 Apr 2018 22:13)  POCT Blood Glucose.: 112 mg/dL (11 Apr 2018 18:06)  POCT Blood Glucose.: 100 mg/dL (11 Apr 2018 13:08)    I&O's Summary    11 Apr 2018 07:01  -  12 Apr 2018 07:00  --------------------------------------------------------  IN: 2000 mL / OUT: 3150 mL / NET: -1150 mL      PHYSICAL EXAM:  GENERAL: no apparent distress, on room air  HEENT: sclera clear b/l  CHEST/LUNG: Clear to auscultation bilaterally; No wheezing or crackles  HEART: s1/s2, no murmurs appreciated  ABDOMEN: Soft, Nontender, Nondistended; Bowel sounds present  EXTREMITIES:  No clubbing, cyanosis, or edema  NEUROLOGY: awake, alert, responds to Qs appropriately, no gross focal deficits, moving all extremities, no sensory deficits  SKIN: mild erythematous dry rash of LUE    LABS:                        8.9    7.79  )-----------( 284      ( 12 Apr 2018 05:17 )             27.1     04-12    129<L>  |  94<L>  |  4<L>  ----------------------------<  116<H>  3.6   |  21<L>  |  0.69    Ca    7.2<L>      12 Apr 2018 05:17  Phos  2.1     04-12  Mg     1.5     04-12                RADIOLOGY & ADDITIONAL TESTS:

## 2018-04-12 NOTE — PROGRESS NOTE ADULT - ASSESSMENT
Pt is 67 yo F w/ hx HTN, DM p/w LBP x 2 weeks found to have epidural abscess on MRI  L1-L4 without focal deficits with possible discitis at L4-L5 complicated by acute GIB ( s/p transfusion 4 u prbc and 1 u plts) secondary to NSAID use EGD done showed concern for malignancy. CT A/P done  Multiloculated peripherally enhancing fluid collection tracking along the medial aspect of the left psoas muscle measuring 2.6 x 1.3 x 10.0 cm and infrarenal IVC thrombus and b/l common illiac thrombus.

## 2018-04-12 NOTE — PROGRESS NOTE ADULT - PROBLEM SELECTOR PLAN 1
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 Per IR attending, abscesses not in the location or large enough to be drained  vanco/ CTX per ID recs, WBC resolved  4/6 blood cx with aggregatibacter, DERRICK today, HIV negative, eventual PICC  f/u repeat BCx from 4/11

## 2018-04-12 NOTE — PROGRESS NOTE ADULT - SUBJECTIVE AND OBJECTIVE BOX
Back pain improved. Denies leg pain. No sensory loss. Ambulated yesterday    PAST MEDICAL & SURGICAL HISTORY:  Acute gastric ulcer with hemorrhage  Essential hypertension  Type 2 diabetes mellitus without complication  HLD (hyperlipidemia)  H/O tubal ligation    Social History:   Allergies: No Known Allergies      PE: AA&0 x 3, CN 2-12 Bengali speaking  Pathologic reflexes: [neg] [place],  [neg ]  Clonus            Sensory intact to light touch  Motor exam: [  ]          [x ] Upper extremity              Deltoid   Bicep   Tricep     Strength                                               R         5/5          5/5        5/5             5/5                                               L          5/5         5/5         5/5            5/5           [x ] Lower extremeity           HF/HE    KF/KE         EHL          DF/PF                                               R        /5           5/5          5/5            5/5                                                     L         5/5          5/5           5/5            5/5             Straight Leg Raise:  RLE :                           LLE:  Incision site:  Ambulation: Walking independently [ ] With Cane [ ] With Walker [ ]  Bedbound [ ]                           8.9    7.79  )-----------( 284      ( 2018 05:17 )             27.1     04-12    129<L>  |  94<L>  |  4<L>  ----------------------------<  116<H>  3.6   |  21<L>  |  0.69    Ca    7.2<L>      2018 05:17  Phos  2.1     04-12  Mg     1.5     04-12      PT/INR - ( 10 Apr 2018 09:10 )   PT: 14.4 SEC;   INR: 1.25          PTT - ( 10 Apr 2018 09:10 )  PTT:29.6 SEC    Vital Signs Last 24 Hrs  T(C): 36.8 (2018 05:10), Max: 37.6 (2018 08:15)  T(F): 98.2 (2018 05:10), Max: 99.6 (2018 08:15)  HR: 94 (2018 05:10) (86 - 94)  BP: 130/82 (2018 05:10) (110/70 - 144/91)  BP(mean): --  RR: 18 (2018 05:10) (17 - 18)  SpO2: 95% (2018 05:10) (95% - 98%)  Daily Height in cm: 134.62 (2018 08:15)    Daily Weight in k.9 (2018 01:19)    Medications: acetaminophen   Tablet 650 milliGRAM(s) Oral every 6 hours PRN  acetaminophen   Tablet. 650 milliGRAM(s) Oral every 6 hours PRN  atorvastatin 40 milliGRAM(s) Oral at bedtime  cefTRIAXone   IVPB 2 Gram(s) IV Intermittent every 24 hours  cefTRIAXone   IVPB      dextrose 5%. 1000 milliLiter(s) IV Continuous <Continuous>  dextrose 50% Injectable 12.5 Gram(s) IV Push once  dextrose 50% Injectable 25 Gram(s) IV Push once  dextrose 50% Injectable 25 Gram(s) IV Push once  dextrose Gel 1 Dose(s) Oral once PRN  docusate sodium 100 milliGRAM(s) Oral three times a day  glucagon  Injectable 1 milliGRAM(s) IntraMuscular once PRN  insulin lispro (HumaLOG) corrective regimen sliding scale   SubCutaneous three times a day before meals  insulin lispro (HumaLOG) corrective regimen sliding scale   SubCutaneous at bedtime  lidocaine   Patch 1 Patch Transdermal daily  morphine  - Injectable 2 milliGRAM(s) IV Push every 4 hours PRN  ondansetron Injectable 4 milliGRAM(s) IV Push every 6 hours PRN  oxyCODONE    IR 5 milliGRAM(s) Oral every 4 hours PRN  pantoprazole    Tablet 40 milliGRAM(s) Oral two times a day  senna 2 Tablet(s) Oral at bedtime  sodium chloride 0.9%. 1000 milliLiter(s) IV Continuous <Continuous>  sucralfate 1 Gram(s) Oral four times a day  vancomycin  IVPB 1250 milliGRAM(s) IV Intermittent every 12 hours      Imaging Studies:

## 2018-04-13 DIAGNOSIS — D68.59 OTHER PRIMARY THROMBOPHILIA: ICD-10-CM

## 2018-04-13 LAB
APTT BLD: 100 SEC — HIGH (ref 27.5–37.4)
APTT BLD: 100.9 SEC — HIGH (ref 27.5–37.4)
APTT BLD: 72.1 SEC — HIGH (ref 27.5–37.4)
BACTERIA BLD CULT: SIGNIFICANT CHANGE UP
BASOPHILS # BLD AUTO: 0.06 K/UL — SIGNIFICANT CHANGE UP (ref 0–0.2)
BASOPHILS NFR BLD AUTO: 0.8 % — SIGNIFICANT CHANGE UP (ref 0–2)
BLD GP AB SCN SERPL QL: NEGATIVE — SIGNIFICANT CHANGE UP
BUN SERPL-MCNC: 3 MG/DL — LOW (ref 7–23)
CALCIUM SERPL-MCNC: 7.3 MG/DL — LOW (ref 8.4–10.5)
CHLORIDE SERPL-SCNC: 96 MMOL/L — LOW (ref 98–107)
CO2 SERPL-SCNC: 23 MMOL/L — SIGNIFICANT CHANGE UP (ref 22–31)
CREAT SERPL-MCNC: 0.74 MG/DL — SIGNIFICANT CHANGE UP (ref 0.5–1.3)
EOSINOPHIL # BLD AUTO: 0.1 K/UL — SIGNIFICANT CHANGE UP (ref 0–0.5)
EOSINOPHIL NFR BLD AUTO: 1.3 % — SIGNIFICANT CHANGE UP (ref 0–6)
GLUCOSE SERPL-MCNC: 104 MG/DL — HIGH (ref 70–99)
HCT VFR BLD CALC: 25.7 % — LOW (ref 34.5–45)
HCT VFR BLD CALC: 25.7 % — LOW (ref 34.5–45)
HCT VFR BLD CALC: 26.1 % — LOW (ref 34.5–45)
HCT VFR BLD CALC: 27.7 % — LOW (ref 34.5–45)
HGB BLD-MCNC: 8.1 G/DL — LOW (ref 11.5–15.5)
HGB BLD-MCNC: 8.6 G/DL — LOW (ref 11.5–15.5)
HGB BLD-MCNC: 8.6 G/DL — LOW (ref 11.5–15.5)
HGB BLD-MCNC: 9.2 G/DL — LOW (ref 11.5–15.5)
IMM GRANULOCYTES # BLD AUTO: 0.05 # — SIGNIFICANT CHANGE UP
IMM GRANULOCYTES NFR BLD AUTO: 0.6 % — SIGNIFICANT CHANGE UP (ref 0–1.5)
INR BLD: 1.44 — HIGH (ref 0.88–1.17)
LYMPHOCYTES # BLD AUTO: 2.2 K/UL — SIGNIFICANT CHANGE UP (ref 1–3.3)
LYMPHOCYTES # BLD AUTO: 27.5 % — SIGNIFICANT CHANGE UP (ref 13–44)
MAGNESIUM SERPL-MCNC: 1.7 MG/DL — SIGNIFICANT CHANGE UP (ref 1.6–2.6)
MCHC RBC-ENTMCNC: 28.3 PG — SIGNIFICANT CHANGE UP (ref 27–34)
MCHC RBC-ENTMCNC: 29.1 PG — SIGNIFICANT CHANGE UP (ref 27–34)
MCHC RBC-ENTMCNC: 29.2 PG — SIGNIFICANT CHANGE UP (ref 27–34)
MCHC RBC-ENTMCNC: 29.5 PG — SIGNIFICANT CHANGE UP (ref 27–34)
MCHC RBC-ENTMCNC: 31.5 % — LOW (ref 32–36)
MCHC RBC-ENTMCNC: 33 % — SIGNIFICANT CHANGE UP (ref 32–36)
MCHC RBC-ENTMCNC: 33.2 % — SIGNIFICANT CHANGE UP (ref 32–36)
MCHC RBC-ENTMCNC: 33.5 % — SIGNIFICANT CHANGE UP (ref 32–36)
MCV RBC AUTO: 87.1 FL — SIGNIFICANT CHANGE UP (ref 80–100)
MCV RBC AUTO: 88.2 FL — SIGNIFICANT CHANGE UP (ref 80–100)
MCV RBC AUTO: 88.8 FL — SIGNIFICANT CHANGE UP (ref 80–100)
MCV RBC AUTO: 89.9 FL — SIGNIFICANT CHANGE UP (ref 80–100)
MONOCYTES # BLD AUTO: 0.86 K/UL — SIGNIFICANT CHANGE UP (ref 0–0.9)
MONOCYTES NFR BLD AUTO: 10.8 % — SIGNIFICANT CHANGE UP (ref 2–14)
NEUTROPHILS # BLD AUTO: 4.73 K/UL — SIGNIFICANT CHANGE UP (ref 1.8–7.4)
NEUTROPHILS NFR BLD AUTO: 59 % — SIGNIFICANT CHANGE UP (ref 43–77)
NRBC # FLD: 0 — SIGNIFICANT CHANGE UP
OB PNL STL: POSITIVE — SIGNIFICANT CHANGE UP
PHOSPHATE SERPL-MCNC: 2.9 MG/DL — SIGNIFICANT CHANGE UP (ref 2.5–4.5)
PLATELET # BLD AUTO: 275 K/UL — SIGNIFICANT CHANGE UP (ref 150–400)
PLATELET # BLD AUTO: 293 K/UL — SIGNIFICANT CHANGE UP (ref 150–400)
PLATELET # BLD AUTO: 329 K/UL — SIGNIFICANT CHANGE UP (ref 150–400)
PLATELET # BLD AUTO: 329 K/UL — SIGNIFICANT CHANGE UP (ref 150–400)
PMV BLD: 8.8 FL — SIGNIFICANT CHANGE UP (ref 7–13)
PMV BLD: 8.9 FL — SIGNIFICANT CHANGE UP (ref 7–13)
PMV BLD: 8.9 FL — SIGNIFICANT CHANGE UP (ref 7–13)
PMV BLD: 9 FL — SIGNIFICANT CHANGE UP (ref 7–13)
POTASSIUM SERPL-MCNC: 3.4 MMOL/L — LOW (ref 3.5–5.3)
POTASSIUM SERPL-SCNC: 3.4 MMOL/L — LOW (ref 3.5–5.3)
PROTHROM AB SERPL-ACNC: 16.1 SEC — HIGH (ref 9.8–13.1)
RBC # BLD: 2.86 M/UL — LOW (ref 3.8–5.2)
RBC # BLD: 2.95 M/UL — LOW (ref 3.8–5.2)
RBC # BLD: 2.96 M/UL — LOW (ref 3.8–5.2)
RBC # BLD: 3.12 M/UL — LOW (ref 3.8–5.2)
RBC # FLD: 14.4 % — SIGNIFICANT CHANGE UP (ref 10.3–14.5)
RBC # FLD: 14.4 % — SIGNIFICANT CHANGE UP (ref 10.3–14.5)
RBC # FLD: 14.5 % — SIGNIFICANT CHANGE UP (ref 10.3–14.5)
RBC # FLD: 14.5 % — SIGNIFICANT CHANGE UP (ref 10.3–14.5)
RH IG SCN BLD-IMP: POSITIVE — SIGNIFICANT CHANGE UP
RH IG SCN BLD-IMP: POSITIVE — SIGNIFICANT CHANGE UP
SODIUM SERPL-SCNC: 130 MMOL/L — LOW (ref 135–145)
WBC # BLD: 7.33 K/UL — SIGNIFICANT CHANGE UP (ref 3.8–10.5)
WBC # BLD: 8 K/UL — SIGNIFICANT CHANGE UP (ref 3.8–10.5)
WBC # BLD: 8.61 K/UL — SIGNIFICANT CHANGE UP (ref 3.8–10.5)
WBC # BLD: 8.76 K/UL — SIGNIFICANT CHANGE UP (ref 3.8–10.5)
WBC # FLD AUTO: 7.33 K/UL — SIGNIFICANT CHANGE UP (ref 3.8–10.5)
WBC # FLD AUTO: 8 K/UL — SIGNIFICANT CHANGE UP (ref 3.8–10.5)
WBC # FLD AUTO: 8.61 K/UL — SIGNIFICANT CHANGE UP (ref 3.8–10.5)
WBC # FLD AUTO: 8.76 K/UL — SIGNIFICANT CHANGE UP (ref 3.8–10.5)

## 2018-04-13 PROCEDURE — 99233 SBSQ HOSP IP/OBS HIGH 50: CPT

## 2018-04-13 PROCEDURE — 99232 SBSQ HOSP IP/OBS MODERATE 35: CPT

## 2018-04-13 RX ORDER — PANTOPRAZOLE SODIUM 20 MG/1
80 TABLET, DELAYED RELEASE ORAL ONCE
Qty: 0 | Refills: 0 | Status: COMPLETED | OUTPATIENT
Start: 2018-04-13 | End: 2018-04-13

## 2018-04-13 RX ORDER — PANTOPRAZOLE SODIUM 20 MG/1
8 TABLET, DELAYED RELEASE ORAL
Qty: 80 | Refills: 0 | Status: DISCONTINUED | OUTPATIENT
Start: 2018-04-13 | End: 2018-04-16

## 2018-04-13 RX ORDER — SODIUM CHLORIDE 9 MG/ML
1000 INJECTION, SOLUTION INTRAVENOUS
Qty: 0 | Refills: 0 | Status: DISCONTINUED | OUTPATIENT
Start: 2018-04-13 | End: 2018-04-17

## 2018-04-13 RX ADMIN — SODIUM CHLORIDE 75 MILLILITER(S): 9 INJECTION, SOLUTION INTRAVENOUS at 18:37

## 2018-04-13 RX ADMIN — Medication 1 GRAM(S): at 18:37

## 2018-04-13 RX ADMIN — Medication 1 GRAM(S): at 03:20

## 2018-04-13 RX ADMIN — PANTOPRAZOLE SODIUM 40 MILLIGRAM(S): 20 TABLET, DELAYED RELEASE ORAL at 18:37

## 2018-04-13 RX ADMIN — CEFTRIAXONE 100 GRAM(S): 500 INJECTION, POWDER, FOR SOLUTION INTRAMUSCULAR; INTRAVENOUS at 11:53

## 2018-04-13 RX ADMIN — HEPARIN SODIUM 900 UNIT(S)/HR: 5000 INJECTION INTRAVENOUS; SUBCUTANEOUS at 13:47

## 2018-04-13 RX ADMIN — SODIUM CHLORIDE 75 MILLILITER(S): 9 INJECTION, SOLUTION INTRAVENOUS at 13:47

## 2018-04-13 RX ADMIN — HEPARIN SODIUM 1000 UNIT(S)/HR: 5000 INJECTION INTRAVENOUS; SUBCUTANEOUS at 03:28

## 2018-04-13 RX ADMIN — LIDOCAINE 1 PATCH: 4 CREAM TOPICAL at 22:36

## 2018-04-13 RX ADMIN — HEPARIN SODIUM 900 UNIT(S)/HR: 5000 INJECTION INTRAVENOUS; SUBCUTANEOUS at 20:46

## 2018-04-13 RX ADMIN — LIDOCAINE 1 PATCH: 4 CREAM TOPICAL at 11:53

## 2018-04-13 RX ADMIN — PANTOPRAZOLE SODIUM 40 MILLIGRAM(S): 20 TABLET, DELAYED RELEASE ORAL at 05:36

## 2018-04-13 RX ADMIN — PANTOPRAZOLE SODIUM 10 MG/HR: 20 TABLET, DELAYED RELEASE ORAL at 22:36

## 2018-04-13 RX ADMIN — Medication 1: at 10:07

## 2018-04-13 RX ADMIN — ATORVASTATIN CALCIUM 40 MILLIGRAM(S): 80 TABLET, FILM COATED ORAL at 22:36

## 2018-04-13 RX ADMIN — SODIUM CHLORIDE 75 MILLILITER(S): 9 INJECTION, SOLUTION INTRAVENOUS at 07:52

## 2018-04-13 RX ADMIN — PANTOPRAZOLE SODIUM 80 MILLIGRAM(S): 20 TABLET, DELAYED RELEASE ORAL at 21:44

## 2018-04-13 RX ADMIN — Medication 1 GRAM(S): at 11:53

## 2018-04-13 RX ADMIN — Medication 166.67 MILLIGRAM(S): at 07:52

## 2018-04-13 RX ADMIN — Medication 1 GRAM(S): at 05:36

## 2018-04-13 NOTE — PROGRESS NOTE ADULT - ASSESSMENT
ASSESSMENT  68y female with GI bleed, likely 2/2 gastric mass, DVT s/p IVC filter, now with acute PE on Heparin drip    PLAN  - f/u path from biopsy here on 4/11  - trend hct, transfuse prn  - f/u GI if hct continues to drip, poss. rpt endoscopy to look for GI bleed on hep gtt.  - Will d/w attending.       Chris Saenz MD PGY3  D Team Surgery, #[id number]

## 2018-04-13 NOTE — PROGRESS NOTE ADULT - PROBLEM SELECTOR PLAN 3
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 Per IR attending, abscesses not in the location or large enough to be drained  DC vanco and c/w CTX for 6 weeks from the negative blood cxper ID recs  4/6 blood cx with aggregatibacter, DERRICK neg for endocarditis but + PE, HIV negative, Pending PICC Left Epidural L1-L4 and psoas abscess with osteomyelitis and discitis. Per IR attending, abscesses not in the location or large enough to be drained  DC vanco and c/w CTX for 6 weeks from the negative blood cx from 4/8 per ID recs  4/6 blood cx with aggregatibacter, DERRICK neg for endocarditis but + PE, HIV negative, Pending PICC

## 2018-04-13 NOTE — PROGRESS NOTE ADULT - PROBLEM SELECTOR PLAN 4
improving, initially thought to be secondary to HCTZ improved off HCTZ   Low serum osm, suspect hypotonic hypovolemic hyponatremia  c/w LR IVF, TSH WNL, supplement Mg

## 2018-04-13 NOTE — PROGRESS NOTE ADULT - ASSESSMENT
68 f with DM2, HTN, HLD - gastric ulcer presented to [facility] 4/5/18 for fever and back pain, was found to have L1-L4 left sided posterior epidural abscess with discitis and osteomyelitis.   urine cx: E. faecalis  pt received vanco and zosyn then switched to Imipenem as patient was febrile, then transferred to [facility] for abscess drainage, here abd CT showed also psoas abscess with extensive DVT in b/l iliac and infrarenal IVC  pt is from , denied any TB exposure   blood cx with aggregatibacter, now clear   HIV negative    Aggrigatebacter bacteremia, Left Epidural L1-L4 and psoas abscess with osteomyelitis and discitis,   extensive DVT in b/l iliac veins and infrarenal IVC s/p IVC filter but DERRICK with no vegetation but a large mobile mass in pulmonary artery protruding to the right pulm artery and CTA showed b/l PE  GI bleed due to gastric ulcer, ? malignancy      neurosurgery and IR stated that will not drain  epidural abscesses as the paitent does not have any neuro deficit  IR also could not drain the psoas abscess  f/u quantiferon   c/w ceftriaxone 2 g q d  can place a picc line and will do 6 weeks of ceftriaxone from the negative blood cx 4/8  management of AR, extensive thrombosis, gastric ulcer as per primary team

## 2018-04-13 NOTE — PROGRESS NOTE ADULT - SUBJECTIVE AND OBJECTIVE BOX
Follow Up:  epidural abscess, psoas abscess, discitis and osteomyelitis, bacteremia    Interval History: pt was found to have PE also and was started on heparin drip, still afebrile and stable    ROS:      All other systems negative    Constitutional: no fever, no chills  Head: no trauma  Eyes: no vision changes, no eye pain  ENT:  no vision changes, no sore throat, no rhinorrhea  Cardiovascular:  no chest pain, no palpitation  Respiratory:  no SOB, no cough  GI:  no abd pain, no vomiting, no diarrhea  urinary: no dysuria, no hematuria, no flank pain  musculoskeletal:  + back pain  skin:  no rash  neurology:  no headache, no seizure, no change in mental status  psych: no anxiety, no depression         Allergies  No Known Allergies        ANTIMICROBIALS:  cefTRIAXone   IVPB 2 every 24 hours  cefTRIAXone   IVPB        OTHER MEDS:  acetaminophen   Tablet 650 milliGRAM(s) Oral every 6 hours PRN  acetaminophen   Tablet. 650 milliGRAM(s) Oral every 6 hours PRN  atorvastatin 40 milliGRAM(s) Oral at bedtime  dextrose 5%. 1000 milliLiter(s) IV Continuous <Continuous>  dextrose 50% Injectable 12.5 Gram(s) IV Push once  dextrose 50% Injectable 25 Gram(s) IV Push once  dextrose 50% Injectable 25 Gram(s) IV Push once  dextrose Gel 1 Dose(s) Oral once PRN  docusate sodium 100 milliGRAM(s) Oral three times a day  glucagon  Injectable 1 milliGRAM(s) IntraMuscular once PRN  heparin  Infusion.  Unit(s)/Hr IV Continuous <Continuous>  heparin  Injectable 5000 Unit(s) IV Push every 6 hours PRN  heparin  Injectable 2500 Unit(s) IV Push every 6 hours PRN  insulin lispro (HumaLOG) corrective regimen sliding scale   SubCutaneous three times a day before meals  insulin lispro (HumaLOG) corrective regimen sliding scale   SubCutaneous at bedtime  lactated ringers. 1000 milliLiter(s) IV Continuous <Continuous>  lidocaine   Patch 1 Patch Transdermal daily  morphine  - Injectable 2 milliGRAM(s) IV Push every 4 hours PRN  ondansetron Injectable 4 milliGRAM(s) IV Push every 6 hours PRN  oxyCODONE    IR 5 milliGRAM(s) Oral every 4 hours PRN  pantoprazole    Tablet 40 milliGRAM(s) Oral two times a day  senna 2 Tablet(s) Oral at bedtime  sucralfate 1 Gram(s) Oral four times a day      Vital Signs Last 24 Hrs  T(C): 37.4 (13 Apr 2018 05:34), Max: 37.7 (13 Apr 2018 01:31)  T(F): 99.3 (13 Apr 2018 05:34), Max: 99.8 (13 Apr 2018 01:31)  HR: 86 (13 Apr 2018 05:34) (86 - 94)  BP: 121/84 (13 Apr 2018 05:34) (121/84 - 143/91)  BP(mean): --  RR: 17 (13 Apr 2018 05:34) (16 - 18)  SpO2: 95% (13 Apr 2018 05:34) (94% - 99%)    Physical Exam:  General:    NAD,  non toxic  Head: atraumatic, normocephalic  Eye: normal sclera and conjunctiva  ENT:    no oropharyngeal lesions,   no LAD,   neck supple  Cardio:     regular S1, S2,  no murmur  Respiratory:    clear b/l,    no wheezing  abd:     soft,   BS +,   no tenderness,    no organomegaly  :   no CVAT,  no suprapubic tenderness,   no  mcclendon  Musculoskeletal:   spine tenderness on the lumbar area  vascular: no lines, normal pulses  Skin:    no rash  Neurologic:     no focal deficit  psych: normal affect, no suicidal ideation                          9.2    7.33  )-----------( 293      ( 13 Apr 2018 09:43 )             27.7       04-13    130<L>  |  96<L>  |  3<L>  ----------------------------<  104<H>  3.4<L>   |  23  |  0.74    Ca    7.3<L>      13 Apr 2018 02:55  Phos  2.9     04-13  Mg     1.7     04-13            MICROBIOLOGY:  v  BLOOD PERIPHERAL  04-11-18 --  --  --      BLOOD  04-11-18 --  --  --      BLOOD  04-08-18 --  --  --      .Blood Blood  04-06-18   Growth in aerobic bottle: Aggregatibacter aphrophilus  Susceptibilites not performed.  --  --      .Blood Blood  04-06-18   Growth in aerobic bottle: Aggregatibacter aphrophilus    .Blood Blood  04-04-18   No growth at 5 days.  --  --      .Urine Clean Catch (Midstream)  04-04-18   10,000 - 49,000 CFU/mL Enterococcus faecalis  --  Enterococcus faecalis      .Urine Clean Catch (Midstream)  03-25-18   No growth  --  --                RADIOLOGY:    < from: CT Angio Chest w/ IV Cont (04.12.18 @ 19:57) >  Bilateral lower lobe segmental/subsegmental pulmonary emboli.    8 mm lateral right breast nodule. Correlate with dedicated breast imaging.    < from: CT Abdomen and Pelvis w/ IV Cont (04.08.18 @ 12:00) >  IMPRESSION:   Multiloculated peripherally enhancing fluid collection tracking down the   left psoas muscle, likely an abscess.    Thrombus within the infrarenal IVC and bilateral common iliac veins.    Limited evaluation of the stomach secondary to underdistention/ lack of   oral contrast.     8 mm right outer breast nodule, for which correlation with mammography is   recommended.    < from: MR Lumbar Spine w/ IV Cont (04.05.18 @ 20:12) >  IMPRESSION:  Large epidural fluid collection suggestive of an abscess in the lumbar   spine from lower L1 through mid L4 levels, with mass effect on the thecal   sac as described. Adjacent left paravertebral soft tissue thickening and   inflammatory signal.    Findings suggests discitis and osteomyelitis of L4-L5. Possible small   anterior epidural abscess at L4-L5 disc level.    Moderate right facet joint effusion at L4-L5 with extra-articular   extension the paravertebral soft tissues demonstrates peripheral   enhancement; an infection/abscess of the right L4-L5 facet joint is not   excluded.

## 2018-04-13 NOTE — PROGRESS NOTE ADULT - NSHPATTENDINGPLANDISCUSS_GEN_ALL_CORE
patient, PA, daughter: osmani at bedside with Dr. Albert providing translation, all updates and Qs answered

## 2018-04-13 NOTE — PROGRESS NOTE ADULT - ASSESSMENT
Pt is 69 yo F w/ hx HTN, DM p/w LBP x 2 weeks found to have epidural abscess on MRI  L1-L4 without focal deficits with possible discitis at L4-L5 complicated by acute GIB ( s/p transfusion 4 u prbc and 1 u plts) secondary to NSAID use EGD done showed concern for malignancy. CT A/P done  Multiloculated peripherally enhancing fluid collection tracking along the medial aspect of the left psoas muscle measuring 2.6 x 1.3 x 10.0 cm and infrarenal IVC thrombus and b/l common illiac thrombus s/p IVC filter. Now found to have acute PE and started on heparin drip 68 F  with DM2, HTN, HLD - gastric ulcer presented to [facility] 4/5/18 for fever and back pain, was found to have L1-L4 left sided posterior epidural abscess with discitis and osteomyelitis. Course complicated by acute GIB ( s/p transfusion 4 u prbc and 1 u plts) and finding of infrarenal IVC thrombus and b/l common illiac thrombus s/p IVC filter. Now found to have acute PE and started on heparin drip

## 2018-04-13 NOTE — PROGRESS NOTE ADULT - SUBJECTIVE AND OBJECTIVE BOX
Patient is a 68y old  Female who presents with a chief complaint of Lumbar epidural abscess (11 Apr 2018 18:09)      SUBJECTIVE / OVERNIGHT EVENTS: CTA overnight revealed acute PE, heparin drip started. Pt denies chest pain, SOB, N/V/D. Reports dry cough. Hungarian interpretation provided at bedside by Dr. Devante Albert    MEDICATIONS  (STANDING):  atorvastatin 40 milliGRAM(s) Oral at bedtime  cefTRIAXone   IVPB 2 Gram(s) IV Intermittent every 24 hours  cefTRIAXone   IVPB      dextrose 5%. 1000 milliLiter(s) (50 mL/Hr) IV Continuous <Continuous>  dextrose 50% Injectable 12.5 Gram(s) IV Push once  dextrose 50% Injectable 25 Gram(s) IV Push once  dextrose 50% Injectable 25 Gram(s) IV Push once  docusate sodium 100 milliGRAM(s) Oral three times a day  heparin  Infusion.  Unit(s)/Hr (11 mL/Hr) IV Continuous <Continuous>  insulin lispro (HumaLOG) corrective regimen sliding scale   SubCutaneous three times a day before meals  insulin lispro (HumaLOG) corrective regimen sliding scale   SubCutaneous at bedtime  lactated ringers. 1000 milliLiter(s) (75 mL/Hr) IV Continuous <Continuous>  lidocaine   Patch 1 Patch Transdermal daily  pantoprazole    Tablet 40 milliGRAM(s) Oral two times a day  senna 2 Tablet(s) Oral at bedtime  sucralfate 1 Gram(s) Oral four times a day    MEDICATIONS  (PRN):  acetaminophen   Tablet 650 milliGRAM(s) Oral every 6 hours PRN For Temp greater than 38 C (100.4 F)  acetaminophen   Tablet. 650 milliGRAM(s) Oral every 6 hours PRN Mild Pain (1 - 3)  dextrose Gel 1 Dose(s) Oral once PRN Blood Glucose LESS THAN 70 milliGRAM(s)/deciliter  glucagon  Injectable 1 milliGRAM(s) IntraMuscular once PRN Glucose LESS THAN 70 milligrams/deciliter  heparin  Injectable 5000 Unit(s) IV Push every 6 hours PRN For aPTT less than 40  heparin  Injectable 2500 Unit(s) IV Push every 6 hours PRN For aPTT between 40 - 57  morphine  - Injectable 2 milliGRAM(s) IV Push every 4 hours PRN Severe Pain (7 - 10)  ondansetron Injectable 4 milliGRAM(s) IV Push every 6 hours PRN Nausea and/or Vomiting  oxyCODONE    IR 5 milliGRAM(s) Oral every 4 hours PRN Moderate Pain (4 - 6)      T(C): 37.4 (04-13-18 @ 05:34), Max: 37.7 (04-13-18 @ 01:31)  HR: 86 (04-13-18 @ 05:34) (86 - 94)  BP: 121/84 (04-13-18 @ 05:34) (121/84 - 143/91)  RR: 17 (04-13-18 @ 05:34) (16 - 18)  SpO2: 95% (04-13-18 @ 05:34) (94% - 99%)  CAPILLARY BLOOD GLUCOSE      POCT Blood Glucose.: 156 mg/dL (13 Apr 2018 10:01)  POCT Blood Glucose.: 172 mg/dL (12 Apr 2018 22:15)  POCT Blood Glucose.: 105 mg/dL (12 Apr 2018 18:26)    I&O's Summary    12 Apr 2018 07:01  -  13 Apr 2018 07:00  --------------------------------------------------------  IN: 1646 mL / OUT: 1000 mL / NET: 646 mL    PHYSICAL EXAM:  GENERAL: no apparent distress, on room air  HEENT: sclera clear b/l  CHEST/LUNG: Clear to auscultation bilaterally; No wheezing or crackles  HEART: s1/s2, no murmurs appreciated  ABDOMEN: Soft, Nontender, Nondistended; Bowel sounds present  EXTREMITIES:  No clubbing, cyanosis, or edema  NEUROLOGY: awake, alert, responds to Qs appropriately, no gross focal deficits, moving all extremities, no sensory deficits  SKIN: mild erythematous dry rash of LUE    LABS:                        9.2    7.33  )-----------( 293      ( 13 Apr 2018 09:43 )             27.7     04-13    130<L>  |  96<L>  |  3<L>  ----------------------------<  104<H>  3.4<L>   |  23  |  0.74    Ca    7.3<L>      13 Apr 2018 02:55  Phos  2.9     04-13  Mg     1.7     04-13      PT/INR - ( 13 Apr 2018 02:55 )   PT: 16.1 SEC;   INR: 1.44          PTT - ( 13 Apr 2018 02:55 )  PTT:100.9 SEC          RADIOLOGY & ADDITIONAL TESTS:

## 2018-04-13 NOTE — PROGRESS NOTE ADULT - SUBJECTIVE AND OBJECTIVE BOX
D TEAM / SURGICAL ONCOLOGY (#[id number]) PROGRESS NOTE  ---------------------------------------------------------------------------------------------     HD#: [id number]    INTERVAL EVENTS: imaging yesterday showed acute PE, started on heparin drip.  Hct drop this morning, hemodynamically stable.      SUBJECTIVE: Pt seen + examined.  Denies pain currently.  No nausea/emesis, no recent BM.     ---------------------------------------------------------------------------------------------   VITALS  T(C): 37.4 (04-13-18 @ 05:34), Max: 37.7 (04-13-18 @ 01:31)  HR: 86 (04-13-18 @ 05:34) (86 - 94)  BP: 121/84 (04-13-18 @ 05:34) (121/84 - 143/91)  RR: 17 (04-13-18 @ 05:34) (16 - 18)  SpO2: 95% (04-13-18 @ 05:34) (94% - 99%)  POCT Blood Glucose.: 172 mg/dL (12 Apr 2018 22:15)  POCT Blood Glucose.: 105 mg/dL (12 Apr 2018 18:26)  POCT Blood Glucose.: 120 mg/dL (12 Apr 2018 09:38)      Is/Os    04-12 @ 07:01  -  04-13 @ 06:37  --------------------------------------------------------  IN:    heparin  Infusion.: 96 mL    IV PiggyBack: 350 mL    lactated ringers.: 1200 mL  Total IN: 1646 mL    OUT:    Voided: 1000 mL  Total OUT: 1000 mL    Total NET: 646 mL    ---------------------------------------------------------------------------------------------   PHYSICAL EXAM:   General: NAD, Lying in bed comfortably  Neuro: asleep but arousable, alert, oriented  GI/Abd: Soft, NT/ND, no rebound/guarding    ---------------------------------------------------------------------------------------------   MEDICATIONS (STANDING): atorvastatin 40 milliGRAM(s) Oral at bedtime  cefTRIAXone   IVPB 2 Gram(s) IV Intermittent every 24 hours  cefTRIAXone   IVPB      dextrose 5%. 1000 milliLiter(s) IV Continuous <Continuous>  dextrose 50% Injectable 12.5 Gram(s) IV Push once  dextrose 50% Injectable 25 Gram(s) IV Push once  dextrose 50% Injectable 25 Gram(s) IV Push once  docusate sodium 100 milliGRAM(s) Oral three times a day  heparin  Infusion.  Unit(s)/Hr IV Continuous <Continuous>  insulin lispro (HumaLOG) corrective regimen sliding scale   SubCutaneous three times a day before meals  insulin lispro (HumaLOG) corrective regimen sliding scale   SubCutaneous at bedtime  lactated ringers. 1000 milliLiter(s) IV Continuous <Continuous>  pantoprazole    Tablet 40 milliGRAM(s) Oral two times a day  senna 2 Tablet(s) Oral at bedtime  sucralfate 1 Gram(s) Oral four times a day  vancomycin  IVPB 1250 milliGRAM(s) IV Intermittent every 12 hours    MEDICATIONS (PRN):acetaminophen   Tablet 650 milliGRAM(s) Oral every 6 hours PRN For Temp greater than 38 C (100.4 F)  acetaminophen   Tablet. 650 milliGRAM(s) Oral every 6 hours PRN Mild Pain (1 - 3)  dextrose Gel 1 Dose(s) Oral once PRN Blood Glucose LESS THAN 70 milliGRAM(s)/deciliter  glucagon  Injectable 1 milliGRAM(s) IntraMuscular once PRN Glucose LESS THAN 70 milligrams/deciliter  heparin  Injectable 5000 Unit(s) IV Push every 6 hours PRN For aPTT less than 40  heparin  Injectable 2500 Unit(s) IV Push every 6 hours PRN For aPTT between 40 - 57  morphine  - Injectable 2 milliGRAM(s) IV Push every 4 hours PRN Severe Pain (7 - 10)  ondansetron Injectable 4 milliGRAM(s) IV Push every 6 hours PRN Nausea and/or Vomiting  oxyCODONE    IR 5 milliGRAM(s) Oral every 4 hours PRN Moderate Pain (4 - 6)      ---------------------------------------------------------------------------------------------   LABS  CBC (04-13 @ 02:55)                              8.6<L>                         8.00    )----------------(  275        59.0  % Neutrophils, 27.5  % Lymphocytes, ANC: 4.73                                25.7<L>  CBC (04-12 @ 20:40)                              10.2<L>                         9.37    )----------------(  292        --    % Neutrophils, --    % Lymphocytes, ANC: --                                  31.6<L>    BMP (04-13 @ 02:55)             130<L>  |  96<L>   |  3<L>  		Ca++ --      Ca 7.3<L>             ---------------------------------( 104<H>		Mg 1.7                3.4<L>  |  23      |  0.74  			Ph 2.9     BMP (04-12 @ 05:17)             129<L>  |  94<L>   |  4<L>  		Ca++ --      Ca 7.2<L>             ---------------------------------( 116<H>		Mg 1.5<L>             3.6     |  21<L>   |  0.69  			Ph 2.1<L>      Coags (04-13 @ 02:55)  aPTT 100.9<H> / INR 1.44<H> / PT 16.1<H>  Coags (04-12 @ 20:40)  aPTT 32.2 / INR -- / PT --            IMAGING STUDIES  < from: CT Angio Chest w/ IV Cont (04.12.18 @ 19:57) >  ******PRELIMINARY REPORT******        INTERPRETATION:  Acute PEwithin the posterior segmental basal artery of   the left lower lobe. Findings discussed with VINICIUS Zaragoza.    < end of copied text >      ---------------------------------------------------------------------------------------------

## 2018-04-13 NOTE — PROGRESS NOTE ADULT - PROBLEM SELECTOR PLAN 1
? underlying malignancy  CT A/P: thrombus in infrarenal IVC and b/l common iliac veins s/p IVC filter placed by IR on 4/10 given recent GIB but then found to have acute PE on CTA 4/12. Given clean based ulcers on recent EGD, started heparin drip, monitoring for bleeding closely ? underlying malignancy  CT A/P: thrombus in infrarenal IVC and b/l common iliac veins s/p IVC filter placed by IR on 4/10 given recent GIB but then found to have acute PE on CTA 4/12. Given clean based ulcers on recent EGD, started heparin drip, monitoring for bleeding closely. H/H stable, no bleeding since started drip. Switch to therapeutic lovenox later today if CBC stable, Bridge to coumadin as patient has no insurance

## 2018-04-13 NOTE — PROGRESS NOTE ADULT - PROBLEM SELECTOR PLAN 2
H/H stable, s/p 4 U prbc with appropriate rise  EGD done 4/11 with Non-bleeding gastric ulcer with a clean ulcer base and one non-bleeding duodenal ulcer with a clean ulcer  base (Faraz Class III) which was benign appearing. F/U pathology  PO Protonix BID, low risk of rebleeding, monitoring CBC on Hep gtt H/H stable, s/p 4 U prbc with appropriate rise  EGD done 4/11 with Non-bleeding gastric ulcer with a clean ulcer base and one non-bleeding duodenal ulcer with a clean ulcer  base (Faraz Class III) which was benign appearing. F/U pathology  PO Protonix BID, low risk of rebleeding, monitoring CBC on Hep gtt with plan to switch to lovenox to coumadin bridge if H/H remains stable and after PICC

## 2018-04-13 NOTE — PROGRESS NOTE ADULT - ATTENDING COMMENTS
daughter: Ian: [phone]  sister: Dorys: [phone]    Dispo: pending PICC placement, monitor CBC on heparin gtt, if no bleeding, will likely start NOAC if covered by insurance daughter: Ian: [phone]  sister: Dorys: [phone]    Dispo: pending PICC placement  Per SW: patient is medicaid pending, will need PT eval

## 2018-04-14 LAB
APTT BLD: 73.6 SEC — HIGH (ref 27.5–37.4)
BASOPHILS # BLD AUTO: 0.04 K/UL — SIGNIFICANT CHANGE UP (ref 0–0.2)
BASOPHILS NFR BLD AUTO: 0.5 % — SIGNIFICANT CHANGE UP (ref 0–2)
EOSINOPHIL # BLD AUTO: 0.04 K/UL — SIGNIFICANT CHANGE UP (ref 0–0.5)
EOSINOPHIL NFR BLD AUTO: 0.5 % — SIGNIFICANT CHANGE UP (ref 0–6)
HCT VFR BLD CALC: 22.3 % — LOW (ref 34.5–45)
HCT VFR BLD CALC: 23.2 % — LOW (ref 34.5–45)
HCT VFR BLD CALC: 24.9 % — LOW (ref 34.5–45)
HCT VFR BLD CALC: 25 % — LOW (ref 34.5–45)
HCT VFR BLD CALC: 25.1 % — LOW (ref 34.5–45)
HCT VFR BLD CALC: 25.7 % — LOW (ref 34.5–45)
HCT VFR BLD CALC: 25.9 % — LOW (ref 34.5–45)
HGB BLD-MCNC: 7.2 G/DL — LOW (ref 11.5–15.5)
HGB BLD-MCNC: 7.3 G/DL — LOW (ref 11.5–15.5)
HGB BLD-MCNC: 8 G/DL — LOW (ref 11.5–15.5)
HGB BLD-MCNC: 8.2 G/DL — LOW (ref 11.5–15.5)
HGB BLD-MCNC: 8.3 G/DL — LOW (ref 11.5–15.5)
HGB BLD-MCNC: 8.4 G/DL — LOW (ref 11.5–15.5)
HGB BLD-MCNC: 8.6 G/DL — LOW (ref 11.5–15.5)
IMM GRANULOCYTES # BLD AUTO: 0.05 # — SIGNIFICANT CHANGE UP
IMM GRANULOCYTES NFR BLD AUTO: 0.6 % — SIGNIFICANT CHANGE UP (ref 0–1.5)
LYMPHOCYTES # BLD AUTO: 2.38 K/UL — SIGNIFICANT CHANGE UP (ref 1–3.3)
LYMPHOCYTES # BLD AUTO: 30.9 % — SIGNIFICANT CHANGE UP (ref 13–44)
MCHC RBC-ENTMCNC: 28.4 PG — SIGNIFICANT CHANGE UP (ref 27–34)
MCHC RBC-ENTMCNC: 28.7 PG — SIGNIFICANT CHANGE UP (ref 27–34)
MCHC RBC-ENTMCNC: 28.9 PG — SIGNIFICANT CHANGE UP (ref 27–34)
MCHC RBC-ENTMCNC: 29.3 PG — SIGNIFICANT CHANGE UP (ref 27–34)
MCHC RBC-ENTMCNC: 29.4 PG — SIGNIFICANT CHANGE UP (ref 27–34)
MCHC RBC-ENTMCNC: 29.4 PG — SIGNIFICANT CHANGE UP (ref 27–34)
MCHC RBC-ENTMCNC: 29.5 PG — SIGNIFICANT CHANGE UP (ref 27–34)
MCHC RBC-ENTMCNC: 31.5 % — LOW (ref 32–36)
MCHC RBC-ENTMCNC: 32.1 % — SIGNIFICANT CHANGE UP (ref 32–36)
MCHC RBC-ENTMCNC: 32.3 % — SIGNIFICANT CHANGE UP (ref 32–36)
MCHC RBC-ENTMCNC: 32.7 % — SIGNIFICANT CHANGE UP (ref 32–36)
MCHC RBC-ENTMCNC: 32.8 % — SIGNIFICANT CHANGE UP (ref 32–36)
MCHC RBC-ENTMCNC: 33.1 % — SIGNIFICANT CHANGE UP (ref 32–36)
MCHC RBC-ENTMCNC: 33.2 % — SIGNIFICANT CHANGE UP (ref 32–36)
MCV RBC AUTO: 88 FL — SIGNIFICANT CHANGE UP (ref 80–100)
MCV RBC AUTO: 88.4 FL — SIGNIFICANT CHANGE UP (ref 80–100)
MCV RBC AUTO: 89.2 FL — SIGNIFICANT CHANGE UP (ref 80–100)
MCV RBC AUTO: 89.3 FL — SIGNIFICANT CHANGE UP (ref 80–100)
MCV RBC AUTO: 89.9 FL — SIGNIFICANT CHANGE UP (ref 80–100)
MCV RBC AUTO: 90.3 FL — SIGNIFICANT CHANGE UP (ref 80–100)
MCV RBC AUTO: 90.7 FL — SIGNIFICANT CHANGE UP (ref 80–100)
MONOCYTES # BLD AUTO: 0.8 K/UL — SIGNIFICANT CHANGE UP (ref 0–0.9)
MONOCYTES NFR BLD AUTO: 10.4 % — SIGNIFICANT CHANGE UP (ref 2–14)
NEUTROPHILS # BLD AUTO: 4.39 K/UL — SIGNIFICANT CHANGE UP (ref 1.8–7.4)
NEUTROPHILS NFR BLD AUTO: 57.1 % — SIGNIFICANT CHANGE UP (ref 43–77)
NRBC # FLD: 0 — SIGNIFICANT CHANGE UP
OB PNL STL: POSITIVE — SIGNIFICANT CHANGE UP
PLATELET # BLD AUTO: 282 K/UL — SIGNIFICANT CHANGE UP (ref 150–400)
PLATELET # BLD AUTO: 287 K/UL — SIGNIFICANT CHANGE UP (ref 150–400)
PLATELET # BLD AUTO: 289 K/UL — SIGNIFICANT CHANGE UP (ref 150–400)
PLATELET # BLD AUTO: 319 K/UL — SIGNIFICANT CHANGE UP (ref 150–400)
PLATELET # BLD AUTO: 322 K/UL — SIGNIFICANT CHANGE UP (ref 150–400)
PLATELET # BLD AUTO: 328 K/UL — SIGNIFICANT CHANGE UP (ref 150–400)
PMV BLD: 8.8 FL — SIGNIFICANT CHANGE UP (ref 7–13)
PMV BLD: 8.8 FL — SIGNIFICANT CHANGE UP (ref 7–13)
PMV BLD: 8.9 FL — SIGNIFICANT CHANGE UP (ref 7–13)
PMV BLD: 9 FL — SIGNIFICANT CHANGE UP (ref 7–13)
PMV BLD: 9.1 FL — SIGNIFICANT CHANGE UP (ref 7–13)
PMV BLD: 9.1 FL — SIGNIFICANT CHANGE UP (ref 7–13)
RBC # BLD: 2.46 M/UL — LOW (ref 3.8–5.2)
RBC # BLD: 2.57 M/UL — LOW (ref 3.8–5.2)
RBC # BLD: 2.79 M/UL — LOW (ref 3.8–5.2)
RBC # BLD: 2.81 M/UL — LOW (ref 3.8–5.2)
RBC # BLD: 2.84 M/UL — LOW (ref 3.8–5.2)
RBC # BLD: 2.86 M/UL — LOW (ref 3.8–5.2)
RBC # BLD: 2.93 M/UL — LOW (ref 3.8–5.2)
RBC # FLD: 14.1 % — SIGNIFICANT CHANGE UP (ref 10.3–14.5)
RBC # FLD: 14.2 % — SIGNIFICANT CHANGE UP (ref 10.3–14.5)
RBC # FLD: 14.2 % — SIGNIFICANT CHANGE UP (ref 10.3–14.5)
RBC # FLD: 14.4 % — SIGNIFICANT CHANGE UP (ref 10.3–14.5)
RBC # FLD: 14.5 % — SIGNIFICANT CHANGE UP (ref 10.3–14.5)
WBC # BLD: 6.94 K/UL — SIGNIFICANT CHANGE UP (ref 3.8–10.5)
WBC # BLD: 7.06 K/UL — SIGNIFICANT CHANGE UP (ref 3.8–10.5)
WBC # BLD: 7.41 K/UL — SIGNIFICANT CHANGE UP (ref 3.8–10.5)
WBC # BLD: 7.49 K/UL — SIGNIFICANT CHANGE UP (ref 3.8–10.5)
WBC # BLD: 7.7 K/UL — SIGNIFICANT CHANGE UP (ref 3.8–10.5)
WBC # BLD: 7.71 K/UL — SIGNIFICANT CHANGE UP (ref 3.8–10.5)
WBC # BLD: 8.77 K/UL — SIGNIFICANT CHANGE UP (ref 3.8–10.5)
WBC # FLD AUTO: 6.94 K/UL — SIGNIFICANT CHANGE UP (ref 3.8–10.5)
WBC # FLD AUTO: 7.06 K/UL — SIGNIFICANT CHANGE UP (ref 3.8–10.5)
WBC # FLD AUTO: 7.41 K/UL — SIGNIFICANT CHANGE UP (ref 3.8–10.5)
WBC # FLD AUTO: 7.49 K/UL — SIGNIFICANT CHANGE UP (ref 3.8–10.5)
WBC # FLD AUTO: 7.7 K/UL — SIGNIFICANT CHANGE UP (ref 3.8–10.5)
WBC # FLD AUTO: 7.71 K/UL — SIGNIFICANT CHANGE UP (ref 3.8–10.5)
WBC # FLD AUTO: 8.77 K/UL — SIGNIFICANT CHANGE UP (ref 3.8–10.5)

## 2018-04-14 PROCEDURE — 99232 SBSQ HOSP IP/OBS MODERATE 35: CPT | Mod: GC

## 2018-04-14 PROCEDURE — 99233 SBSQ HOSP IP/OBS HIGH 50: CPT

## 2018-04-14 RX ORDER — ONDANSETRON 8 MG/1
4 TABLET, FILM COATED ORAL ONCE
Qty: 0 | Refills: 0 | Status: COMPLETED | OUTPATIENT
Start: 2018-04-14 | End: 2018-04-14

## 2018-04-14 RX ADMIN — ONDANSETRON 4 MILLIGRAM(S): 8 TABLET, FILM COATED ORAL at 03:48

## 2018-04-14 RX ADMIN — Medication 1 GRAM(S): at 20:01

## 2018-04-14 RX ADMIN — LIDOCAINE 1 PATCH: 4 CREAM TOPICAL at 13:23

## 2018-04-14 RX ADMIN — PANTOPRAZOLE SODIUM 10 MG/HR: 20 TABLET, DELAYED RELEASE ORAL at 22:20

## 2018-04-14 RX ADMIN — ONDANSETRON 4 MILLIGRAM(S): 8 TABLET, FILM COATED ORAL at 05:11

## 2018-04-14 RX ADMIN — Medication 1 GRAM(S): at 05:12

## 2018-04-14 RX ADMIN — SODIUM CHLORIDE 75 MILLILITER(S): 9 INJECTION, SOLUTION INTRAVENOUS at 22:20

## 2018-04-14 RX ADMIN — Medication 1 GRAM(S): at 00:06

## 2018-04-14 RX ADMIN — ATORVASTATIN CALCIUM 40 MILLIGRAM(S): 80 TABLET, FILM COATED ORAL at 22:20

## 2018-04-14 RX ADMIN — CEFTRIAXONE 100 GRAM(S): 500 INJECTION, POWDER, FOR SOLUTION INTRAMUSCULAR; INTRAVENOUS at 12:17

## 2018-04-14 RX ADMIN — PANTOPRAZOLE SODIUM 10 MG/HR: 20 TABLET, DELAYED RELEASE ORAL at 12:17

## 2018-04-14 RX ADMIN — SODIUM CHLORIDE 75 MILLILITER(S): 9 INJECTION, SOLUTION INTRAVENOUS at 12:17

## 2018-04-14 RX ADMIN — HEPARIN SODIUM 900 UNIT(S)/HR: 5000 INJECTION INTRAVENOUS; SUBCUTANEOUS at 04:04

## 2018-04-14 NOTE — PROGRESS NOTE ADULT - SUBJECTIVE AND OBJECTIVE BOX
Patient is a 68y old  Female for follow up of GI ulcers     used  SUBJECTIVE / OVERNIGHT EVENTS:  GI reconsulted this am. Patient was started on heparin for new finding of DVT on 4/12. Yesterday afternoon/evening patient reports 3 black tarry stools and lightheadedness on standing up. She has not had any bowel movements since 2am. THe patient's heparin was stopped at 430 am. She denies abd pain but has nausea.    MEDICATIONS  (STANDING):  atorvastatin 40 milliGRAM(s) Oral at bedtime  cefTRIAXone   IVPB 2 Gram(s) IV Intermittent every 24 hours  cefTRIAXone   IVPB      dextrose 5%. 1000 milliLiter(s) (50 mL/Hr) IV Continuous <Continuous>  dextrose 50% Injectable 12.5 Gram(s) IV Push once  dextrose 50% Injectable 25 Gram(s) IV Push once  dextrose 50% Injectable 25 Gram(s) IV Push once  insulin lispro (HumaLOG) corrective regimen sliding scale   SubCutaneous three times a day before meals  insulin lispro (HumaLOG) corrective regimen sliding scale   SubCutaneous at bedtime  lactated ringers. 1000 milliLiter(s) (75 mL/Hr) IV Continuous <Continuous>  lidocaine   Patch 1 Patch Transdermal daily  pantoprazole Infusion 8 mG/Hr (10 mL/Hr) IV Continuous <Continuous>  sucralfate 1 Gram(s) Oral four times a day    MEDICATIONS  (PRN):  acetaminophen   Tablet 650 milliGRAM(s) Oral every 6 hours PRN For Temp greater than 38 C (100.4 F)  acetaminophen   Tablet. 650 milliGRAM(s) Oral every 6 hours PRN Mild Pain (1 - 3)  dextrose Gel 1 Dose(s) Oral once PRN Blood Glucose LESS THAN 70 milliGRAM(s)/deciliter  glucagon  Injectable 1 milliGRAM(s) IntraMuscular once PRN Glucose LESS THAN 70 milligrams/deciliter  morphine  - Injectable 2 milliGRAM(s) IV Push every 4 hours PRN Severe Pain (7 - 10)  ondansetron Injectable 4 milliGRAM(s) IV Push every 6 hours PRN Nausea and/or Vomiting  oxyCODONE    IR 5 milliGRAM(s) Oral every 4 hours PRN Moderate Pain (4 - 6)              PHYSICAL EXAM:  GENERAL: NAD, well-developed  HEAD:  Atraumatic, Normocephalic  EYES: EOMI, PERRLA, conjunctiva and sclera anicteric  NECK: Supple, No JVD  CHEST/LUNG: Clear to auscultation bilaterally; No wheeze  HEART: Regular rate and rhythm; No murmurs, rubs, or gallops  ABDOMEN: Soft, Nontender, Nondistended; Bowel sounds present, no hepatosplenomegaly, no rebound or guarding  EXTREMITIES:  2+ Peripheral Pulses, No clubbing, cyanosis, or edema  PSYCH: AAOx3  NEUROLOGY: non-focal, no asterixis  SKIN: No rashes or lesion  RECTUM: melenic stool    LABS:                        7.2    6.94  )-----------( 328      ( 14 Apr 2018 05:45 )             22.3     04-13    130<L>  |  96<L>  |  3<L>  ----------------------------<  104<H>  3.4<L>   |  23  |  0.74    Ca    7.3<L>      13 Apr 2018 02:55  Phos  2.9     04-13  Mg     1.7     04-13        PT/INR - ( 13 Apr 2018 02:55 )   PT: 16.1 SEC;   INR: 1.44          PTT - ( 14 Apr 2018 02:41 )  PTT:73.6 SEC          RADIOLOGY & ADDITIONAL TESTS:

## 2018-04-14 NOTE — PROGRESS NOTE ADULT - ASSESSMENT
68 F  with DM2, HTN, HLD - gastric ulcer presented to [facility] 4/5/18 for fever and back pain, was found to have L1-L4 left sided posterior epidural abscess with discitis and osteomyelitis. Course complicated by acute GIB ( s/p transfusion 4 u prbc and 1 u plts) and finding of infrarenal IVC thrombus and b/l common illiac thrombus s/p IVC filter. Now found to have acute PE and started on heparin drip    1.: Hypercoagulable state.   thrombus in infrarenal IVC and b/l common iliac veins   s/p IVC filters on 4/10    2. GI bleed : off heparin for now   will monitor Hb   s/p 1 PRBC  EGD done 4/11 with Non-bleeding gastric ulcer with a clean ulcer base and one non-bleeding duodenal ulcer with a clean ulcer  base (Faraz Class III) which was benign appearing. F/U pathology  continue protonix   GI f/u appreciated     3.Anemia due to blood loss.   , monitoring CBC on Hep gtt with plan to switch to lovenox to coumadin bridge if H/H remains stable and after PICC.  but no off herapin   will restart if h and h stable and ok with GI     4. Epidural abscess.     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 Per IR attending, abscesses not in the location or large enough to be drained  DC vanco and c/w CTX for 6 weeks from the negative blood cx from 4/8 per ID recs  4/6 blood cx with aggregatibacter, DERRICK neg for endocarditis but + PE, HIV negative,     5.: Hyponatremia.  today labs are pending   improving, initially thought to be secondary to HCTZ improved off HCTZ   Low serum osm, suspect hypotonic hypovolemic hyponatremia    6.Type 2 diabetes mellitus without complication, unspecified long term insulin use status.  Plan: monitor FS QAC/HS, FS controlled, ISS  -A1c-6.9.     7. Breast nodule.    8mm nodule in LT breast   -prior mammo 2016 neg   -consider US/outpt mammo.    8. Essential hypertension  BP stable, TTE shows stage I diastolic dysfunction   -BP meds on hold.     Gi and DVT prophylaxis     Attending Attestation:   daughter: Ian: [phone]  sister: Dorys: [phone]    Dispo: pending PICC placement  Per SW: patient is medicaid pending, will need PT eval .

## 2018-04-14 NOTE — PROGRESS NOTE ADULT - ASSESSMENT
Impression:    1. Recurrent upper GI bleed: likely oozing from known gastric and duodenal ulcers, although there were no high risk stigmata on recent EGD from 4/11. This bleeding was likely triggered by heparin drip. The patient is hemodynamically stable. Hopefully bleeding has ceased given last BM 7 h ago.    2. DVT/segmental PE: patient is s/p IVC filter    3. Epidural abscess    4. DM2    Recommendation:  -continue PPI infusion  -hold heparin  -keep NPO  -f/u post transfusion CBC. Impression:    1. Recurrent upper GI bleed: likely oozing from known gastric and duodenal ulcers, although there were no high risk stigmata on recent EGD from 4/11. This bleeding was likely triggered by heparin drip. The patient is hemodynamically stable. Hopefully bleeding has ceased given last BM 7 h ago.    2. DVT/segmental PE: patient is s/p IVC filter    3. Epidural abscess    4. DM2    Recommendation:  -continue PPI infusion  -hold heparin  -keep NPO  -f/u post transfusion CBC, goal hgb 7-8.  -ambulate w assistance given orthostatic symptoms Impression:    1. Recurrent upper GI bleed: likely oozing from known gastric and duodenal ulcers, although there were no high risk stigmata on recent EGD from 4/11. This bleeding was likely triggered by heparin drip. The patient is hemodynamically stable. Hopefully bleeding has ceased given last BM 7 h ago.    2. DVT/segmental PE: patient is s/p IVC filter    3. Epidural abscess    4. DM2    Recommendation:  -continue PPI infusion  -hold heparin  -keep NPO  -f/u post transfusion CBC, goal hgb 7-8.  -ambulate w assistance given orthostatic symptoms  -if patient continues to bleed off of heparin, would need another EGD and possibly a VCE Impression:    1. Recurrent upper GI bleed: likely oozing from known gastric and duodenal ulcers, although there were no high risk stigmata on recent EGD from 4/11. This bleeding was likely triggered by heparin drip. The patient is hemodynamically stable. Hopefully bleeding has ceased given last BM 7 h ago.    2. DVT/segmental PE: patient is s/p IVC filter    3. Epidural abscess    4. DM2    Recommendation:  -continue PPI infusion  -hold heparin  -keep NPO  -f/u post transfusion CBC, goal hgb 7-8.  -ambulate w assistance given orthostatic symptoms  -if patient continues to bleed off of heparin, would need another EGD and possibly a VCE  -please check a BMP and coags today Impression:    1. Recurrent upper GI bleed: likely oozing from known gastric and duodenal ulcers, although there were no high risk stigmata on recent EGD from 4/11. This bleeding was likely triggered by heparin drip. The patient is hemodynamically stable. Hopefully bleeding has ceased given last BM 7 h ago.    2. DVT/segmental PE: patient is s/p IVC filter    3. Epidural abscess    4. DM2    Recommendation:  -continue PPI infusion  -hold heparin  -Advance to clear liquid diet today.  -f/u post transfusion CBC, goal hgb 7-8.  -ambulate w assistance given orthostatic symptoms  -if patient continues to bleed off of heparin, would need another EGD and possibly a VCE  -please check a BMP and coags today

## 2018-04-14 NOTE — PROGRESS NOTE ADULT - SUBJECTIVE AND OBJECTIVE BOX
68y old  Female who presents with a chief complaint of Lumbar epidural abscess (11 Apr 2018 18:09)      patient seen and seen and examine at bed side   she have episode of GI bleed last night   off heparin drip as per GI   monitor Hb   denies chest pain, SOB, N/V/D      MEDICATIONS  (STANDING):  atorvastatin 40 milliGRAM(s) Oral at bedtime  cefTRIAXone   IVPB 2 Gram(s) IV Intermittent every 24 hours  cefTRIAXone   IVPB      dextrose 5%. 1000 milliLiter(s) (50 mL/Hr) IV Continuous <Continuous>  dextrose 50% Injectable 12.5 Gram(s) IV Push once  dextrose 50% Injectable 25 Gram(s) IV Push once  dextrose 50% Injectable 25 Gram(s) IV Push once  insulin lispro (HumaLOG) corrective regimen sliding scale   SubCutaneous three times a day before meals  insulin lispro (HumaLOG) corrective regimen sliding scale   SubCutaneous at bedtime  lactated ringers. 1000 milliLiter(s) (75 mL/Hr) IV Continuous <Continuous>  lidocaine   Patch 1 Patch Transdermal daily  pantoprazole Infusion 8 mG/Hr (10 mL/Hr) IV Continuous <Continuous>  sucralfate 1 Gram(s) Oral four times a day    MEDICATIONS  (PRN):  acetaminophen   Tablet 650 milliGRAM(s) Oral every 6 hours PRN For Temp greater than 38 C (100.4 F)  acetaminophen   Tablet. 650 milliGRAM(s) Oral every 6 hours PRN Mild Pain (1 - 3)  dextrose Gel 1 Dose(s) Oral once PRN Blood Glucose LESS THAN 70 milliGRAM(s)/deciliter  glucagon  Injectable 1 milliGRAM(s) IntraMuscular once PRN Glucose LESS THAN 70 milligrams/deciliter  morphine  - Injectable 2 milliGRAM(s) IV Push every 4 hours PRN Severe Pain (7 - 10)  ondansetron Injectable 4 milliGRAM(s) IV Push every 6 hours PRN Nausea and/or Vomiting  oxyCODONE    IR 5 milliGRAM(s) Oral every 4 hours PRN Moderate Pain (4 - 6)      Vital Signs Last 24 Hrs  T(C): 36.8 (14 Apr 2018 09:20), Max: 37.9 (13 Apr 2018 21:26)  T(F): 98.3 (14 Apr 2018 09:20), Max: 100.2 (13 Apr 2018 21:26)  HR: 87 (14 Apr 2018 09:20) (64 - 99)  BP: 116/77 (14 Apr 2018 09:20) (116/77 - 140/91)  BP(mean): --  RR: 16 (14 Apr 2018 09:20) (16 - 24)  SpO2: 99% (14 Apr 2018 09:20) (95% - 100%)      CAPILLARY BLOOD GLUCOSE      POCT Blood Glucose.: 101 mg/dL (14 Apr 2018 09:08)  POCT Blood Glucose.: 118 mg/dL (13 Apr 2018 21:22)  POCT Blood Glucose.: 107 mg/dL (13 Apr 2018 18:14)  POCT Blood Glucose.: 100 mg/dL (13 Apr 2018 13:26)        PHYSICAL EXAM:  GENERAL: no apparent distress, on room air  HEENT: sclera clear b/l  CHEST/LUNG: Clear to auscultation bilaterally; No wheezing or crackles  HEART: s1/s2, no murmurs appreciated  ABDOMEN: Soft, Nontender, Nondistended; Bowel sounds present  EXTREMITIES:  No clubbing, cyanosis, or edema  NEUROLOGY: awake, alert, responds to Qs appropriately, no gross focal deficits, moving all extremities, no sensory deficits  SKIN: mild erythematous dry rash of LUE    LABS:                                     8.6    7.06  )-----------( x        ( 14 Apr 2018 10:27 )             25.9       04-13    130<L>  |  96<L>  |  3<L>  ----------------------------<  104<H>  3.4<L>   |  23  |  0.74    Ca    7.3<L>      13 Apr 2018 02:55  Phos  2.9     04-13  Mg     1.7     04-13          PT/INR - ( 13 Apr 2018 02:55 )   PT: 16.1 SEC;   INR: 1.44          PTT - ( 14 Apr 2018 02:41 )  PTT:73.6 SEC        RADIOLOGY & ADDITIONAL TESTS:

## 2018-04-14 NOTE — CHART NOTE - NSCHARTNOTEFT_GEN_A_CORE
Notified by RN at 2000  that patient had black tarry bowel movement. As per RN that patient had 3  black bowel movement  during  day .Patient is currently on heparin drip for drip for PE and patient is s/p EGD on 4/11/18 which showed non bleeding gastric and duodenal ulcer . Seen patient at bedside. Patient is Nigerian speaking interpretation done with the help of Nigerian speaking NP Leilani Friedman. Patient reported 5 black bowel movement since 11 am .Reports intermittent cramping left lower quadrant pain that started since 10 am. Denies nausea, vomiting. On exam abdomen soft . non tender positive bowel sounds. No epigastric tenderness .  ICU Vital Signs Last 24 Hrs  T(C): 37.2 (14 Apr 2018 01:10), Max: 37.9 (13 Apr 2018 21:26)  T(F): 99 (14 Apr 2018 01:10), Max: 100.2 (13 Apr 2018 21:26)  HR: 99 (14 Apr 2018 01:10) (64 - 99)  BP: 128/85 (14 Apr 2018 01:10) (121/84 - 140/91)  BP(mean): --  ABP: --  ABP(mean): --  RR: 24 (14 Apr 2018 01:10) (17 - 24)  SpO2: 95% (14 Apr 2018 01:10) (95% - 100%)  CBC Full  -  ( 14 Apr 2018 00:40 )  WBC Count : 8.77 K/uL  Hemoglobin : 8.0 g/dL  Hematocrit : 24.9 %  Platelet Count - Automated : 319 K/uL  Mean Cell Volume : 89.2 fL  Mean Cell Hemoglobin : 28.7 pg  Mean Cell Hemoglobin Concentration : 32.1 %  Auto Neutrophil # : x  Auto Lymphocyte # : x  Auto Monocyte # : x  Auto Eosinophil # : x  Auto Basophil # : x  Auto Neutrophil % : x  Auto Lymphocyte % : x  Auto Monocyte % : x  Auto Eosinophil % : x  Auto Basophil % : x    67 yo F w/ hx HTN, DM p/w LBP x 2 weeks found to have epidural abscess on MRI  L1-L4 without focal deficits with possible discitis at L4-L5 complicated by acute GIB ( s/p transfusion 4 u prbc and 1 u plts) secondary to NSAID use EGD done showed concern for malignancy. CT A/P done  Multiloculated peripherally enhancing fluid collection tracking along the medial aspect of the left psoas muscle measuring 2.6 x 1.3 x 10.0 cm and infrarenal IVC thrombus and b/l common illiac thrombus , positive for PE on heparin drip now with GI Bleed  GI bleed    Protonix drip   NPO  Vitals Q4 hours   CBC Q4 hours   Stool occult blood --positive   Will continue heparin drip for PE , case discussed with hospitalist Radha.

## 2018-04-14 NOTE — CHART NOTE - NSCHARTNOTEFT_GEN_A_CORE
As per RN patient had 2 more episode of black bowel movement at 2200 and 0200 . H & H noted to be dropped to 7.3/23.2 from 8/24.9 . Ordered to transfuse 1 unit packed red blood cells . Discontinued heparin drip in the setting of active GI bleed and drop in H & H . Will monitor CBC Q4 hours. Discussed with hospitalist SARI Alexander .

## 2018-04-15 LAB
ALBUMIN SERPL ELPH-MCNC: 2 G/DL — LOW (ref 3.3–5)
ALP SERPL-CCNC: 60 U/L — SIGNIFICANT CHANGE UP (ref 40–120)
ALT FLD-CCNC: 27 U/L — SIGNIFICANT CHANGE UP (ref 4–33)
APTT BLD: 29 SEC — SIGNIFICANT CHANGE UP (ref 27.5–37.4)
AST SERPL-CCNC: 41 U/L — HIGH (ref 4–32)
BASOPHILS # BLD AUTO: 0.04 K/UL — SIGNIFICANT CHANGE UP (ref 0–0.2)
BASOPHILS # BLD AUTO: 0.05 K/UL — SIGNIFICANT CHANGE UP (ref 0–0.2)
BASOPHILS NFR BLD AUTO: 0.6 % — SIGNIFICANT CHANGE UP (ref 0–2)
BASOPHILS NFR BLD AUTO: 0.7 % — SIGNIFICANT CHANGE UP (ref 0–2)
BILIRUB SERPL-MCNC: 0.3 MG/DL — SIGNIFICANT CHANGE UP (ref 0.2–1.2)
BUN SERPL-MCNC: 11 MG/DL — SIGNIFICANT CHANGE UP (ref 7–23)
CALCIUM SERPL-MCNC: 7.7 MG/DL — LOW (ref 8.4–10.5)
CHLORIDE SERPL-SCNC: 98 MMOL/L — SIGNIFICANT CHANGE UP (ref 98–107)
CO2 SERPL-SCNC: 23 MMOL/L — SIGNIFICANT CHANGE UP (ref 22–31)
CREAT SERPL-MCNC: 0.93 MG/DL — SIGNIFICANT CHANGE UP (ref 0.5–1.3)
EOSINOPHIL # BLD AUTO: 0.07 K/UL — SIGNIFICANT CHANGE UP (ref 0–0.5)
EOSINOPHIL # BLD AUTO: 0.08 K/UL — SIGNIFICANT CHANGE UP (ref 0–0.5)
EOSINOPHIL NFR BLD AUTO: 0.9 % — SIGNIFICANT CHANGE UP (ref 0–6)
EOSINOPHIL NFR BLD AUTO: 1.2 % — SIGNIFICANT CHANGE UP (ref 0–6)
GLUCOSE SERPL-MCNC: 98 MG/DL — SIGNIFICANT CHANGE UP (ref 70–99)
HCT VFR BLD CALC: 23.5 % — LOW (ref 34.5–45)
HCT VFR BLD CALC: 25.5 % — LOW (ref 34.5–45)
HCT VFR BLD CALC: 26 % — LOW (ref 34.5–45)
HGB BLD-MCNC: 7.9 G/DL — LOW (ref 11.5–15.5)
HGB BLD-MCNC: 8.2 G/DL — LOW (ref 11.5–15.5)
HGB BLD-MCNC: 8.4 G/DL — LOW (ref 11.5–15.5)
IMM GRANULOCYTES # BLD AUTO: 0.04 # — SIGNIFICANT CHANGE UP
IMM GRANULOCYTES # BLD AUTO: 0.07 # — SIGNIFICANT CHANGE UP
IMM GRANULOCYTES NFR BLD AUTO: 0.6 % — SIGNIFICANT CHANGE UP (ref 0–1.5)
IMM GRANULOCYTES NFR BLD AUTO: 0.9 % — SIGNIFICANT CHANGE UP (ref 0–1.5)
LYMPHOCYTES # BLD AUTO: 2.02 K/UL — SIGNIFICANT CHANGE UP (ref 1–3.3)
LYMPHOCYTES # BLD AUTO: 2.52 K/UL — SIGNIFICANT CHANGE UP (ref 1–3.3)
LYMPHOCYTES # BLD AUTO: 29.4 % — SIGNIFICANT CHANGE UP (ref 13–44)
LYMPHOCYTES # BLD AUTO: 33.5 % — SIGNIFICANT CHANGE UP (ref 13–44)
MAGNESIUM SERPL-MCNC: 1.5 MG/DL — LOW (ref 1.6–2.6)
MCHC RBC-ENTMCNC: 29.1 PG — SIGNIFICANT CHANGE UP (ref 27–34)
MCHC RBC-ENTMCNC: 29.1 PG — SIGNIFICANT CHANGE UP (ref 27–34)
MCHC RBC-ENTMCNC: 29.7 PG — SIGNIFICANT CHANGE UP (ref 27–34)
MCHC RBC-ENTMCNC: 32.2 % — SIGNIFICANT CHANGE UP (ref 32–36)
MCHC RBC-ENTMCNC: 32.3 % — SIGNIFICANT CHANGE UP (ref 32–36)
MCHC RBC-ENTMCNC: 32.9 % — SIGNIFICANT CHANGE UP (ref 32–36)
MCHC RBC-ENTMCNC: 32.9 % — SIGNIFICANT CHANGE UP (ref 32–36)
MCHC RBC-ENTMCNC: 33.6 % — SIGNIFICANT CHANGE UP (ref 32–36)
MCV RBC AUTO: 88.3 FL — SIGNIFICANT CHANGE UP (ref 80–100)
MCV RBC AUTO: 90 FL — SIGNIFICANT CHANGE UP (ref 80–100)
MCV RBC AUTO: 90.1 FL — SIGNIFICANT CHANGE UP (ref 80–100)
MCV RBC AUTO: 90.1 FL — SIGNIFICANT CHANGE UP (ref 80–100)
MCV RBC AUTO: 90.4 FL — SIGNIFICANT CHANGE UP (ref 80–100)
MONOCYTES # BLD AUTO: 0.69 K/UL — SIGNIFICANT CHANGE UP (ref 0–0.9)
MONOCYTES # BLD AUTO: 0.71 K/UL — SIGNIFICANT CHANGE UP (ref 0–0.9)
MONOCYTES NFR BLD AUTO: 10.1 % — SIGNIFICANT CHANGE UP (ref 2–14)
MONOCYTES NFR BLD AUTO: 9.4 % — SIGNIFICANT CHANGE UP (ref 2–14)
NEUTROPHILS # BLD AUTO: 3.99 K/UL — SIGNIFICANT CHANGE UP (ref 1.8–7.4)
NEUTROPHILS # BLD AUTO: 4.1 K/UL — SIGNIFICANT CHANGE UP (ref 1.8–7.4)
NEUTROPHILS NFR BLD AUTO: 54.6 % — SIGNIFICANT CHANGE UP (ref 43–77)
NEUTROPHILS NFR BLD AUTO: 58.1 % — SIGNIFICANT CHANGE UP (ref 43–77)
NRBC # FLD: 0 — SIGNIFICANT CHANGE UP
PHOSPHATE SERPL-MCNC: 3.3 MG/DL — SIGNIFICANT CHANGE UP (ref 2.5–4.5)
PLATELET # BLD AUTO: 252 K/UL — SIGNIFICANT CHANGE UP (ref 150–400)
PLATELET # BLD AUTO: 277 K/UL — SIGNIFICANT CHANGE UP (ref 150–400)
PLATELET # BLD AUTO: 279 K/UL — SIGNIFICANT CHANGE UP (ref 150–400)
PMV BLD: 8.7 FL — SIGNIFICANT CHANGE UP (ref 7–13)
PMV BLD: 8.8 FL — SIGNIFICANT CHANGE UP (ref 7–13)
PMV BLD: 8.8 FL — SIGNIFICANT CHANGE UP (ref 7–13)
PMV BLD: 9 FL — SIGNIFICANT CHANGE UP (ref 7–13)
PMV BLD: 9.1 FL — SIGNIFICANT CHANGE UP (ref 7–13)
POTASSIUM SERPL-MCNC: 3.6 MMOL/L — SIGNIFICANT CHANGE UP (ref 3.5–5.3)
POTASSIUM SERPL-SCNC: 3.6 MMOL/L — SIGNIFICANT CHANGE UP (ref 3.5–5.3)
PROT SERPL-MCNC: 5.8 G/DL — LOW (ref 6–8.3)
RBC # BLD: 2.66 M/UL — LOW (ref 3.8–5.2)
RBC # BLD: 2.82 M/UL — LOW (ref 3.8–5.2)
RBC # BLD: 2.83 M/UL — LOW (ref 3.8–5.2)
RBC # BLD: 2.83 M/UL — LOW (ref 3.8–5.2)
RBC # BLD: 2.89 M/UL — LOW (ref 3.8–5.2)
RBC # FLD: 14.3 % — SIGNIFICANT CHANGE UP (ref 10.3–14.5)
RBC # FLD: 14.4 % — SIGNIFICANT CHANGE UP (ref 10.3–14.5)
RBC # FLD: 14.4 % — SIGNIFICANT CHANGE UP (ref 10.3–14.5)
SODIUM SERPL-SCNC: 132 MMOL/L — LOW (ref 135–145)
SPECIMEN SOURCE: SIGNIFICANT CHANGE UP
WBC # BLD: 6.5 K/UL — SIGNIFICANT CHANGE UP (ref 3.8–10.5)
WBC # BLD: 6.86 K/UL — SIGNIFICANT CHANGE UP (ref 3.8–10.5)
WBC # BLD: 7.4 K/UL — SIGNIFICANT CHANGE UP (ref 3.8–10.5)
WBC # BLD: 7.52 K/UL — SIGNIFICANT CHANGE UP (ref 3.8–10.5)
WBC # BLD: 7.52 K/UL — SIGNIFICANT CHANGE UP (ref 3.8–10.5)
WBC # FLD AUTO: 6.5 K/UL — SIGNIFICANT CHANGE UP (ref 3.8–10.5)
WBC # FLD AUTO: 6.86 K/UL — SIGNIFICANT CHANGE UP (ref 3.8–10.5)
WBC # FLD AUTO: 7.4 K/UL — SIGNIFICANT CHANGE UP (ref 3.8–10.5)
WBC # FLD AUTO: 7.52 K/UL — SIGNIFICANT CHANGE UP (ref 3.8–10.5)
WBC # FLD AUTO: 7.52 K/UL — SIGNIFICANT CHANGE UP (ref 3.8–10.5)

## 2018-04-15 PROCEDURE — 99233 SBSQ HOSP IP/OBS HIGH 50: CPT

## 2018-04-15 PROCEDURE — 99232 SBSQ HOSP IP/OBS MODERATE 35: CPT | Mod: GC

## 2018-04-15 RX ADMIN — CEFTRIAXONE 100 GRAM(S): 500 INJECTION, POWDER, FOR SOLUTION INTRAMUSCULAR; INTRAVENOUS at 11:07

## 2018-04-15 RX ADMIN — Medication 1 GRAM(S): at 00:05

## 2018-04-15 RX ADMIN — ATORVASTATIN CALCIUM 40 MILLIGRAM(S): 80 TABLET, FILM COATED ORAL at 21:17

## 2018-04-15 RX ADMIN — SODIUM CHLORIDE 75 MILLILITER(S): 9 INJECTION, SOLUTION INTRAVENOUS at 21:17

## 2018-04-15 RX ADMIN — Medication 1 GRAM(S): at 11:08

## 2018-04-15 RX ADMIN — LIDOCAINE 1 PATCH: 4 CREAM TOPICAL at 01:03

## 2018-04-15 RX ADMIN — PANTOPRAZOLE SODIUM 10 MG/HR: 20 TABLET, DELAYED RELEASE ORAL at 10:14

## 2018-04-15 RX ADMIN — LIDOCAINE 1 PATCH: 4 CREAM TOPICAL at 11:08

## 2018-04-15 RX ADMIN — Medication 1 GRAM(S): at 06:30

## 2018-04-15 RX ADMIN — SODIUM CHLORIDE 75 MILLILITER(S): 9 INJECTION, SOLUTION INTRAVENOUS at 06:30

## 2018-04-15 RX ADMIN — PANTOPRAZOLE SODIUM 10 MG/HR: 20 TABLET, DELAYED RELEASE ORAL at 06:30

## 2018-04-15 RX ADMIN — SODIUM CHLORIDE 75 MILLILITER(S): 9 INJECTION, SOLUTION INTRAVENOUS at 10:14

## 2018-04-15 RX ADMIN — Medication 1 GRAM(S): at 18:13

## 2018-04-15 RX ADMIN — PANTOPRAZOLE SODIUM 10 MG/HR: 20 TABLET, DELAYED RELEASE ORAL at 21:17

## 2018-04-15 NOTE — PROGRESS NOTE ADULT - SUBJECTIVE AND OBJECTIVE BOX
Patient is a 68y old  Female for f/u of GI bleeding      SUBJECTIVE / OVERNIGHT EVENTS:  The patient had another episode of melena yesterday in the afternoon.  MEDICATIONS  (STANDING):  atorvastatin 40 milliGRAM(s) Oral at bedtime  cefTRIAXone   IVPB 2 Gram(s) IV Intermittent every 24 hours  cefTRIAXone   IVPB      dextrose 5%. 1000 milliLiter(s) (50 mL/Hr) IV Continuous <Continuous>  dextrose 50% Injectable 12.5 Gram(s) IV Push once  dextrose 50% Injectable 25 Gram(s) IV Push once  dextrose 50% Injectable 25 Gram(s) IV Push once  insulin lispro (HumaLOG) corrective regimen sliding scale   SubCutaneous three times a day before meals  insulin lispro (HumaLOG) corrective regimen sliding scale   SubCutaneous at bedtime  lactated ringers. 1000 milliLiter(s) (75 mL/Hr) IV Continuous <Continuous>  lidocaine   Patch 1 Patch Transdermal daily  pantoprazole Infusion 8 mG/Hr (10 mL/Hr) IV Continuous <Continuous>  sucralfate 1 Gram(s) Oral four times a day    MEDICATIONS  (PRN):  acetaminophen   Tablet 650 milliGRAM(s) Oral every 6 hours PRN For Temp greater than 38 C (100.4 F)  acetaminophen   Tablet. 650 milliGRAM(s) Oral every 6 hours PRN Mild Pain (1 - 3)  dextrose Gel 1 Dose(s) Oral once PRN Blood Glucose LESS THAN 70 milliGRAM(s)/deciliter  glucagon  Injectable 1 milliGRAM(s) IntraMuscular once PRN Glucose LESS THAN 70 milligrams/deciliter  morphine  - Injectable 2 milliGRAM(s) IV Push every 4 hours PRN Severe Pain (7 - 10)  ondansetron Injectable 4 milliGRAM(s) IV Push every 6 hours PRN Nausea and/or Vomiting  oxyCODONE    IR 5 milliGRAM(s) Oral every 4 hours PRN Moderate Pain (4 - 6)              PHYSICAL EXAM:  GENERAL: NAD, well-developed  HEAD:  Atraumatic, Normocephalic  EYES: EOMI, PERRLA, conjunctiva and sclera anicteric  NECK: Supple, No JVD  CHEST/LUNG: Clear to auscultation bilaterally; No wheeze  HEART: Regular rate and rhythm; No murmurs, rubs, or gallops  ABDOMEN: Soft, Nontender, Nondistended; Bowel sounds present, no hepatosplenomegaly, no rebound or guarding  EXTREMITIES:  2+ Peripheral Pulses, No clubbing, cyanosis, or edema  PSYCH: AAOx3  NEUROLOGY: non-focal, no asterixis  SKIN: No rashes or lesion    LABS:                        8.4    7.52  )-----------( 279      ( 15 Apr 2018 04:35 )             25.5     04-15    132<L>  |  98  |  11  ----------------------------<  98  3.6   |  23  |  0.93    Ca    7.7<L>      15 Apr 2018 04:35  Phos  3.3     04-15  Mg     1.5     04-15    TPro  5.8<L>  /  Alb  2.0<L>  /  TBili  0.3  /  DBili  x   /  AST  41<H>  /  ALT  27  /  AlkPhos  60  04-15    LIVER FUNCTIONS - ( 15 Apr 2018 04:35 )  Alb: 2.0 g/dL / Pro: 5.8 g/dL / ALK PHOS: 60 u/L / ALT: 27 u/L / AST: 41 u/L / GGT: x           PTT - ( 15 Apr 2018 04:35 )  PTT:29.0 SEC          RADIOLOGY & ADDITIONAL TESTS: Patient is a 68y old  Female for f/u of GI bleeding      SUBJECTIVE / OVERNIGHT EVENTS:  The patient had another episode of melena yesterday in the afternoon. hgb stable at 8.4.  MEDICATIONS  (STANDING):  atorvastatin 40 milliGRAM(s) Oral at bedtime  cefTRIAXone   IVPB 2 Gram(s) IV Intermittent every 24 hours  cefTRIAXone   IVPB      dextrose 5%. 1000 milliLiter(s) (50 mL/Hr) IV Continuous <Continuous>  dextrose 50% Injectable 12.5 Gram(s) IV Push once  dextrose 50% Injectable 25 Gram(s) IV Push once  dextrose 50% Injectable 25 Gram(s) IV Push once  insulin lispro (HumaLOG) corrective regimen sliding scale   SubCutaneous three times a day before meals  insulin lispro (HumaLOG) corrective regimen sliding scale   SubCutaneous at bedtime  lactated ringers. 1000 milliLiter(s) (75 mL/Hr) IV Continuous <Continuous>  lidocaine   Patch 1 Patch Transdermal daily  pantoprazole Infusion 8 mG/Hr (10 mL/Hr) IV Continuous <Continuous>  sucralfate 1 Gram(s) Oral four times a day    MEDICATIONS  (PRN):  acetaminophen   Tablet 650 milliGRAM(s) Oral every 6 hours PRN For Temp greater than 38 C (100.4 F)  acetaminophen   Tablet. 650 milliGRAM(s) Oral every 6 hours PRN Mild Pain (1 - 3)  dextrose Gel 1 Dose(s) Oral once PRN Blood Glucose LESS THAN 70 milliGRAM(s)/deciliter  glucagon  Injectable 1 milliGRAM(s) IntraMuscular once PRN Glucose LESS THAN 70 milligrams/deciliter  morphine  - Injectable 2 milliGRAM(s) IV Push every 4 hours PRN Severe Pain (7 - 10)  ondansetron Injectable 4 milliGRAM(s) IV Push every 6 hours PRN Nausea and/or Vomiting  oxyCODONE    IR 5 milliGRAM(s) Oral every 4 hours PRN Moderate Pain (4 - 6)              PHYSICAL EXAM:  GENERAL: NAD, well-developed  HEAD:  Atraumatic, Normocephalic  EYES: EOMI, PERRLA, conjunctiva and sclera anicteric  NECK: Supple, No JVD  CHEST/LUNG: Clear to auscultation bilaterally; No wheeze  HEART: Regular rate and rhythm; No murmurs, rubs, or gallops  ABDOMEN: Soft, Nontender, Nondistended; Bowel sounds present, no hepatosplenomegaly, no rebound or guarding  EXTREMITIES:  2+ Peripheral Pulses, No clubbing, cyanosis, or edema  PSYCH: AAOx3  NEUROLOGY: non-focal, no asterixis  SKIN: No rashes or lesion    LABS:                        8.4    7.52  )-----------( 279      ( 15 Apr 2018 04:35 )             25.5     04-15    132<L>  |  98  |  11  ----------------------------<  98  3.6   |  23  |  0.93    Ca    7.7<L>      15 Apr 2018 04:35  Phos  3.3     04-15  Mg     1.5     04-15    TPro  5.8<L>  /  Alb  2.0<L>  /  TBili  0.3  /  DBili  x   /  AST  41<H>  /  ALT  27  /  AlkPhos  60  04-15    LIVER FUNCTIONS - ( 15 Apr 2018 04:35 )  Alb: 2.0 g/dL / Pro: 5.8 g/dL / ALK PHOS: 60 u/L / ALT: 27 u/L / AST: 41 u/L / GGT: x           PTT - ( 15 Apr 2018 04:35 )  PTT:29.0 SEC          RADIOLOGY & ADDITIONAL TESTS:

## 2018-04-15 NOTE — PROGRESS NOTE ADULT - ASSESSMENT
Impression:    1. Recurrent upper GI bleed: likely oozing from known gastric and duodenal ulcers, although there were no high risk stigmata on recent EGD from 4/11. This bleeding was likely triggered by heparin drip. Since stopping the heparin drip, the bleeding appears to have stopped as the hemoglobin is stable, melena yesterday is likely residual blood.    2. DVT/segmental PE: patient is s/p IVC filter    3. Epidural abscess    4. DM2    Recommendation:  -continue PPI infusion  -hold heparin  -continue clears  -don't check CBC more than q24h unless any overt melena  -please check a BMP and coags today Impression:    1. Recurrent upper GI bleed: likely oozing from known gastric and duodenal ulcers, although there were no high risk stigmata on recent EGD from 4/11. This bleeding was likely triggered by heparin drip. Since stopping the heparin drip, the bleeding appears to have stopped as the hemoglobin is stable, melena yesterday is likely residual blood.    2. DVT/segmental PE: patient is s/p IVC filter    3. Epidural abscess    4. DM2    Recommendation:  -continue PPI infusion  -hold heparin  -continue clears  -don't check CBC more than q24h unless any overt melena  -please check a BMP and coags today  -if there is evidence of persistent bleeding will need an EGD./capsule Impression:    1. Recurrent upper GI bleed: likely oozing from known gastric and duodenal ulcers, although there were no high risk stigmata on recent EGD from 4/11. This bleeding was likely triggered by heparin drip. Since stopping the heparin drip, the bleeding appears to have stopped as the hemoglobin is stable, melena yesterday is likely residual blood.    2. DVT/segmental PE: patient is s/p IVC filter    3. Epidural abscess    4. DM2    Recommendation:  -continue PPI infusion  -hold heparin  -Advance diet as tolerated  -don't check CBC more than q24h unless any overt melena  -please check a BMP and coags today  -if there is evidence of persistent bleeding will need an EGD./capsule

## 2018-04-15 NOTE — PROGRESS NOTE ADULT - SUBJECTIVE AND OBJECTIVE BOX
68y old  Female who presents with a chief complaint of Lumbar epidural abscess (11 Apr 2018 18:09)      patient seen and seen and examine at bed side   hb stable   still of heparin as per GI   no more episode of black stool   denies chest pain / palpitation       MEDICATIONS  (STANDING):  atorvastatin 40 milliGRAM(s) Oral at bedtime  cefTRIAXone   IVPB 2 Gram(s) IV Intermittent every 24 hours  cefTRIAXone   IVPB      dextrose 5%. 1000 milliLiter(s) (50 mL/Hr) IV Continuous <Continuous>  dextrose 50% Injectable 12.5 Gram(s) IV Push once  dextrose 50% Injectable 25 Gram(s) IV Push once  dextrose 50% Injectable 25 Gram(s) IV Push once  insulin lispro (HumaLOG) corrective regimen sliding scale   SubCutaneous three times a day before meals  insulin lispro (HumaLOG) corrective regimen sliding scale   SubCutaneous at bedtime  lactated ringers. 1000 milliLiter(s) (75 mL/Hr) IV Continuous <Continuous>  lidocaine   Patch 1 Patch Transdermal daily  pantoprazole Infusion 8 mG/Hr (10 mL/Hr) IV Continuous <Continuous>  sucralfate 1 Gram(s) Oral four times a day    MEDICATIONS  (PRN):  acetaminophen   Tablet 650 milliGRAM(s) Oral every 6 hours PRN For Temp greater than 38 C (100.4 F)  acetaminophen   Tablet. 650 milliGRAM(s) Oral every 6 hours PRN Mild Pain (1 - 3)  dextrose Gel 1 Dose(s) Oral once PRN Blood Glucose LESS THAN 70 milliGRAM(s)/deciliter  glucagon  Injectable 1 milliGRAM(s) IntraMuscular once PRN Glucose LESS THAN 70 milligrams/deciliter  morphine  - Injectable 2 milliGRAM(s) IV Push every 4 hours PRN Severe Pain (7 - 10)  ondansetron Injectable 4 milliGRAM(s) IV Push every 6 hours PRN Nausea and/or Vomiting  oxyCODONE    IR 5 milliGRAM(s) Oral every 4 hours PRN Moderate Pain (4 - 6)    Vital Signs Last 24 Hrs  T(C): 36.9 (15 Apr 2018 10:04), Max: 37.2 (14 Apr 2018 13:26)  T(F): 98.5 (15 Apr 2018 10:04), Max: 98.9 (14 Apr 2018 13:26)  HR: 93 (15 Apr 2018 10:04) (86 - 93)  BP: 140/90 (15 Apr 2018 10:04) (109/67 - 147/86)  BP(mean): --  RR: 16 (15 Apr 2018 10:04) (15 - 18)  SpO2: 98% (15 Apr 2018 10:04) (98% - 100%)        CAPILLARY BLOOD GLUCOSE      POCT Blood Glucose.: 120 mg/dL (15 Apr 2018 09:30)        PHYSICAL EXAM:  GENERAL: no apparent distress, on room air  HEENT: sclera clear b/l  CHEST/LUNG: Clear to auscultation bilaterally; No wheezing or crackles  HEART: s1/s2, no murmurs appreciated  ABDOMEN: Soft, Nontender, Nondistended; Bowel sounds present  EXTREMITIES:  No clubbing, cyanosis, or edema  NEUROLOGY: awake, alert, responds to Qs appropriately, no gross focal deficits, moving all extremities, no sensory deficits  SKIN: mild erythematous dry rash of LUE    LABS:                                            8.2    6.50  )-----------( 279      ( 15 Apr 2018 08:31 )             25.5         04-15    132<L>  |  98  |  11  ----------------------------<  98  3.6   |  23  |  0.93    Ca    7.7<L>      15 Apr 2018 04:35  Phos  3.3     04-15  Mg     1.5     04-15    TPro  5.8<L>  /  Alb  2.0<L>  /  TBili  0.3  /  DBili  x   /  AST  41<H>  /  ALT  27  /  AlkPhos  60  04-15      PTT - ( 15 Apr 2018 04:35 )  PTT:29.0 SEC      RADIOLOGY & ADDITIONAL TESTS:

## 2018-04-15 NOTE — PROGRESS NOTE ADULT - ASSESSMENT
68 F  with DM2, HTN, HLD - gastric ulcer presented to [facility] 4/5/18 for fever and back pain, was found to have L1-L4 left sided posterior epidural abscess with discitis and osteomyelitis. Course complicated by acute GIB ( s/p transfusion 4 u prbc and 1 u plts) and finding of infrarenal IVC thrombus and b/l common illiac thrombus s/p IVC filter. Now found to have acute PE and started on heparin drip    1.: Hypercoagulable state.   thrombus in infrarenal IVC and b/l common iliac veins   s/p IVC filters on 4/10    2. GI bleed : off heparin for now   will monitor Hb daily   s/p 1 PRBC  EGD done 4/11 with Non-bleeding gastric ulcer with a clean ulcer base and one non-bleeding duodenal ulcer with a clean ulcer  base (Faraz Class III) which was benign appearing. F/U pathology  continue protonix   GI f/u appreciated     3.Anemia due to blood loss.   , monitoring CBC on Hep gtt with plan to switch to lovenox to coumadin bridge if H/H remains stable and after PICC.  but no off herapin   will restart if h and h stable and ok with GI     4. Epidural abscess.     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 Per IR attending, abscesses not in the location or large enough to be drained  DC vanco and c/w CTX for 6 weeks from the negative blood cx from 4/8 per ID recs  4/6 blood cx with aggregatibacter, DERRICK neg for endocarditis but + PE, HIV negative,     5.: Hyponatremia.   improving   monitor labs    6.Type 2 diabetes mellitus without complication, unspecified long term insulin use status.  Plan: monitor FS QAC/HS, FS controlled, ISS  -A1c-6.9.     7. Breast nodule.    8mm nodule in LT breast   -prior mammo 2016 neg   -consider US/outpt mammo.    8. Essential hypertension  BP stable, TTE shows stage I diastolic dysfunction   -BP meds on hold.     Gi and DVT prophylaxis     Attending Attestation:   daughter: Ian: [phone]  sister: Dorys: [phone]    Dispo: pending PICC placement  Per SW: patient is medicaid pending, will need PT eval .

## 2018-04-16 LAB
ALBUMIN SERPL ELPH-MCNC: 2.1 G/DL — LOW (ref 3.3–5)
ALP SERPL-CCNC: 58 U/L — SIGNIFICANT CHANGE UP (ref 40–120)
ALT FLD-CCNC: 31 U/L — SIGNIFICANT CHANGE UP (ref 4–33)
AST SERPL-CCNC: 44 U/L — HIGH (ref 4–32)
BACTERIA BLD CULT: SIGNIFICANT CHANGE UP
BACTERIA BLD CULT: SIGNIFICANT CHANGE UP
BASOPHILS # BLD AUTO: 0.05 K/UL — SIGNIFICANT CHANGE UP (ref 0–0.2)
BASOPHILS NFR BLD AUTO: 0.7 % — SIGNIFICANT CHANGE UP (ref 0–2)
BASOPHILS NFR SPEC: 0 % — SIGNIFICANT CHANGE UP (ref 0–2)
BILIRUB SERPL-MCNC: 0.3 MG/DL — SIGNIFICANT CHANGE UP (ref 0.2–1.2)
BUN SERPL-MCNC: 4 MG/DL — LOW (ref 7–23)
CALCIUM SERPL-MCNC: 7.6 MG/DL — LOW (ref 8.4–10.5)
CHLORIDE SERPL-SCNC: 100 MMOL/L — SIGNIFICANT CHANGE UP (ref 98–107)
CO2 SERPL-SCNC: 24 MMOL/L — SIGNIFICANT CHANGE UP (ref 22–31)
CREAT SERPL-MCNC: 0.88 MG/DL — SIGNIFICANT CHANGE UP (ref 0.5–1.3)
EOSINOPHIL # BLD AUTO: 0.08 K/UL — SIGNIFICANT CHANGE UP (ref 0–0.5)
EOSINOPHIL NFR BLD AUTO: 1.2 % — SIGNIFICANT CHANGE UP (ref 0–6)
EOSINOPHIL NFR FLD: 1 % — SIGNIFICANT CHANGE UP (ref 0–6)
GLUCOSE SERPL-MCNC: 99 MG/DL — SIGNIFICANT CHANGE UP (ref 70–99)
HCT VFR BLD CALC: 24.3 % — LOW (ref 34.5–45)
HGB BLD-MCNC: 8.1 G/DL — LOW (ref 11.5–15.5)
IMM GRANULOCYTES # BLD AUTO: 0.05 # — SIGNIFICANT CHANGE UP
IMM GRANULOCYTES NFR BLD AUTO: 0.7 % — SIGNIFICANT CHANGE UP (ref 0–1.5)
LYMPHOCYTES # BLD AUTO: 2.84 K/UL — SIGNIFICANT CHANGE UP (ref 1–3.3)
LYMPHOCYTES # BLD AUTO: 41.8 % — SIGNIFICANT CHANGE UP (ref 13–44)
LYMPHOCYTES NFR SPEC AUTO: 42 % — SIGNIFICANT CHANGE UP (ref 13–44)
MACROCYTES BLD QL: SLIGHT — SIGNIFICANT CHANGE UP
MAGNESIUM SERPL-MCNC: 1.5 MG/DL — LOW (ref 1.6–2.6)
MANUAL SMEAR VERIFICATION: SIGNIFICANT CHANGE UP
MCHC RBC-ENTMCNC: 29.6 PG — SIGNIFICANT CHANGE UP (ref 27–34)
MCHC RBC-ENTMCNC: 33.3 % — SIGNIFICANT CHANGE UP (ref 32–36)
MCV RBC AUTO: 88.7 FL — SIGNIFICANT CHANGE UP (ref 80–100)
MONOCYTES # BLD AUTO: 0.67 K/UL — SIGNIFICANT CHANGE UP (ref 0–0.9)
MONOCYTES NFR BLD AUTO: 9.9 % — SIGNIFICANT CHANGE UP (ref 2–14)
MONOCYTES NFR BLD: 9 % — SIGNIFICANT CHANGE UP (ref 2–9)
MYELOCYTES NFR BLD: 2 % — HIGH (ref 0–0)
NEUTROPHIL AB SER-ACNC: 44 % — SIGNIFICANT CHANGE UP (ref 43–77)
NEUTROPHILS # BLD AUTO: 3.11 K/UL — SIGNIFICANT CHANGE UP (ref 1.8–7.4)
NEUTROPHILS NFR BLD AUTO: 45.7 % — SIGNIFICANT CHANGE UP (ref 43–77)
NRBC # BLD: 0 /100WBC — SIGNIFICANT CHANGE UP
NRBC # FLD: 0 — SIGNIFICANT CHANGE UP
PHOSPHATE SERPL-MCNC: 3.9 MG/DL — SIGNIFICANT CHANGE UP (ref 2.5–4.5)
PLATELET # BLD AUTO: 250 K/UL — SIGNIFICANT CHANGE UP (ref 150–400)
PLATELET COUNT - ESTIMATE: NORMAL — SIGNIFICANT CHANGE UP
PMV BLD: 9 FL — SIGNIFICANT CHANGE UP (ref 7–13)
POLYCHROMASIA BLD QL SMEAR: SLIGHT — SIGNIFICANT CHANGE UP
POTASSIUM SERPL-MCNC: 3.5 MMOL/L — SIGNIFICANT CHANGE UP (ref 3.5–5.3)
POTASSIUM SERPL-SCNC: 3.5 MMOL/L — SIGNIFICANT CHANGE UP (ref 3.5–5.3)
PROT SERPL-MCNC: 5.8 G/DL — LOW (ref 6–8.3)
RBC # BLD: 2.74 M/UL — LOW (ref 3.8–5.2)
RBC # FLD: 14.5 % — SIGNIFICANT CHANGE UP (ref 10.3–14.5)
SMUDGE CELLS # BLD: PRESENT — SIGNIFICANT CHANGE UP
SODIUM SERPL-SCNC: 136 MMOL/L — SIGNIFICANT CHANGE UP (ref 135–145)
VARIANT LYMPHS # BLD: 2 % — SIGNIFICANT CHANGE UP
WBC # BLD: 6.8 K/UL — SIGNIFICANT CHANGE UP (ref 3.8–10.5)
WBC # FLD AUTO: 6.8 K/UL — SIGNIFICANT CHANGE UP (ref 3.8–10.5)

## 2018-04-16 PROCEDURE — 99232 SBSQ HOSP IP/OBS MODERATE 35: CPT

## 2018-04-16 PROCEDURE — 99233 SBSQ HOSP IP/OBS HIGH 50: CPT

## 2018-04-16 RX ORDER — PANTOPRAZOLE SODIUM 20 MG/1
40 TABLET, DELAYED RELEASE ORAL
Qty: 0 | Refills: 0 | Status: DISCONTINUED | OUTPATIENT
Start: 2018-04-16 | End: 2018-04-17

## 2018-04-16 RX ORDER — MAGNESIUM SULFATE 500 MG/ML
1 VIAL (ML) INJECTION ONCE
Qty: 0 | Refills: 0 | Status: COMPLETED | OUTPATIENT
Start: 2018-04-16 | End: 2018-04-16

## 2018-04-16 RX ADMIN — Medication 1 GRAM(S): at 12:14

## 2018-04-16 RX ADMIN — Medication 1 GRAM(S): at 00:14

## 2018-04-16 RX ADMIN — LIDOCAINE 1 PATCH: 4 CREAM TOPICAL at 00:15

## 2018-04-16 RX ADMIN — Medication 1 GRAM(S): at 17:38

## 2018-04-16 RX ADMIN — Medication 1 GRAM(S): at 23:27

## 2018-04-16 RX ADMIN — SODIUM CHLORIDE 75 MILLILITER(S): 9 INJECTION, SOLUTION INTRAVENOUS at 21:52

## 2018-04-16 RX ADMIN — Medication 100 GRAM(S): at 09:03

## 2018-04-16 RX ADMIN — LIDOCAINE 1 PATCH: 4 CREAM TOPICAL at 12:14

## 2018-04-16 RX ADMIN — Medication 1 GRAM(S): at 05:17

## 2018-04-16 RX ADMIN — SODIUM CHLORIDE 75 MILLILITER(S): 9 INJECTION, SOLUTION INTRAVENOUS at 05:17

## 2018-04-16 RX ADMIN — SODIUM CHLORIDE 75 MILLILITER(S): 9 INJECTION, SOLUTION INTRAVENOUS at 09:03

## 2018-04-16 RX ADMIN — CEFTRIAXONE 100 GRAM(S): 500 INJECTION, POWDER, FOR SOLUTION INTRAMUSCULAR; INTRAVENOUS at 12:14

## 2018-04-16 RX ADMIN — ATORVASTATIN CALCIUM 40 MILLIGRAM(S): 80 TABLET, FILM COATED ORAL at 21:52

## 2018-04-16 RX ADMIN — PANTOPRAZOLE SODIUM 40 MILLIGRAM(S): 20 TABLET, DELAYED RELEASE ORAL at 17:38

## 2018-04-16 NOTE — PROGRESS NOTE ADULT - PROBLEM SELECTOR PLAN 3
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 Per IR attending, abscesses not in the location or large enough to be drained  CTX for 6 weeks from the negative blood cx from 4/8 per ID recs  4/6 blood cx with aggregatibacter, DERRICK neg for endocarditis but + PE, HIV negative,

## 2018-04-16 NOTE — PROGRESS NOTE ADULT - SUBJECTIVE AND OBJECTIVE BOX
Follow Up:  epidural abscess, psoas abscess, discitis and osteomyelitis, bacteremia    Interval History/ROS: Patient remains with lower back pain. Denies fever, chills.    Allergies  No Known Allergies    ANTIMICROBIALS:  cefTRIAXone   IVPB 2 every 24 hours  cefTRIAXone   IVPB      PE:    Vital Signs Last 24 Hrs  T(C): 36.8 (16 Apr 2018 17:24), Max: 37.4 (16 Apr 2018 01:03)  T(F): 98.3 (16 Apr 2018 17:24), Max: 99.3 (16 Apr 2018 01:03)  HR: 89 (16 Apr 2018 17:24) (82 - 98)  BP: 137/89 (16 Apr 2018 17:24) (130/82 - 141/81)  BP(mean): --  RR: 17 (16 Apr 2018 17:24) (17 - 18)  SpO2: 98% (16 Apr 2018 17:24) (96% - 99%)    Gen: AOx3, NAD, non-toxic, pleasant  CV: S1+S2 normal, no murmurs  Resp: Clear bilat, no resp distress  Abd: Soft, nontender, +BS  Ext: No LE edema, no wounds  : No Pretty  IV/Skin: No thrombophlebitis  Neuro: no focal deficits    LABS:                          8.1    6.80  )-----------( 250      ( 16 Apr 2018 07:00 )             24.3       04-16    136  |  100  |  4<L>  ----------------------------<  99  3.5   |  24  |  0.88    Ca    7.6<L>      16 Apr 2018 07:00  Phos  3.9     04-16  Mg     1.5     04-16    TPro  5.8<L>  /  Alb  2.1<L>  /  TBili  0.3  /  DBili  x   /  AST  44<H>  /  ALT  31  /  AlkPhos  58  04-16          MICROBIOLOGY:  v  BLOOD PERIPHERAL  04-11-18 --  --  --      BLOOD  04-11-18 --  --  --      BLOOD  04-08-18 --  --  --      .Blood Blood  04-06-18   Growth in aerobic bottle: Aggregatibacter aphrophilus  Susceptibilites not performed.  --  --      .Blood Blood  04-06-18   Growth in aerobic bottle: Aggregatibacter aphrophilus  "Susceptibilities not performed"  "Due to technical problems, Proteus sp. will Not be reported as part of  the BCID panel until further notice"  ***Blood Panel PCR results on this specimen are available  approximately 3 hours after the Gram stain result.***  Gram stain, PCR, and/or culture results may not always  correspond due to difference in methodologies.  ************************************************************  This PCR assay was performed using [employer].  The following targets are tested for: Enterococcus,  vancomycin resistant enterococci, Listeria monocytogenes,  coagulase negative staphylococci, S. aureus,  methicillin resistant S. aureus, Streptococcus agalactiae  (Group B), S. pneumoniae, S. pyogenes (Group A),  Acinetobacter baumannii, Enterobacter cloacae, E. coli,  Klebsiella oxytoca, K. pneumoniae, Proteus sp.,  Serratia marcescens, Haemophilus influenzae,  Neisseria meningitidis, Pseudomonas aeruginosa,Candida  albicans, C. glabrata, C krusei, C parapsilosis,  C. tropicalis and the KPC resistance gene.  --  Blood Culture PCR      .Blood Blood  04-04-18   No growth at 5 days.  --  --      .Urine Clean Catch (Midstream)  04-04-18   10,000 - 49,000 CFU/mL Enterococcus faecalis  --  Enterococcus faecalis      .Urine Clean Catch (Midstream)  03-25-18   No growth  --  --    RADIOLOGY:    No new images.

## 2018-04-16 NOTE — PROGRESS NOTE ADULT - NSHPATTENDINGPLANDISCUSS_GEN_ALL_CORE
daughter: osmani at bedside, patient, RN Crystal daughter: osamni at bedside, patient, RN Kyara, VINICIUS Aguirre

## 2018-04-16 NOTE — PROGRESS NOTE ADULT - PROBLEM SELECTOR PLAN 2
? underlying malignancy  CT A/P: thrombus in infrarenal IVC and b/l common iliac veins s/p IVC filter placed by IR on 4/10 given recent GIB but then found to have acute PE on CTA 4/12.   Now off heparin drip due to melena over weekend, H/H now stable, s/p 1 U PRBC ? underlying malignancy: recommended age appropriate cancer screening with mammogram and colonoscopy  CT A/P: thrombus in infrarenal IVC and b/l common iliac veins s/p IVC filter placed by IR on 4/10 given recent GIB but then found to have acute PE on CTA 4/12.   Now off heparin drip due to melena over weekend, H/H now stable, s/p 1 U PRBC

## 2018-04-16 NOTE — PROGRESS NOTE ADULT - PROBLEM SELECTOR PLAN 1
H/H now stable, s/p 1 PRBC over weekend, now off heparin drip and on PPi infusion  -if there is evidence of persistent bleeding will need an EGD./capsule per GI  EGD done 4/11 with Non-bleeding gastric ulcer with a clean ulcer base and one non-bleeding duodenal ulcer with a clean ulcer  base (Faraz Class III) which was benign appearing. F/U pathology H/H now stable, s/p 1 PRBC over weekend, now off heparin drip  Switch to PO PPi BID and advance diet as no further episodes of melena  -if there is evidence of persistent bleeding will need an EGD./capsule per GI  EGD done 4/11 with Non-bleeding gastric ulcer with a clean ulcer base and one non-bleeding duodenal ulcer with a clean ulcer  base (Faraz Class III) which was benign appearing. F/U pathology

## 2018-04-16 NOTE — PROGRESS NOTE ADULT - SUBJECTIVE AND OBJECTIVE BOX
Patient is a 68y old  Female who presents with a chief complaint of Lumbar epidural abscess (11 Apr 2018 18:09)      SUBJECTIVE / OVERNIGHT EVENTS: Citizen of [place] interpretation provided by MILIND Sparrow at bedside. No acute events overnight. No further melena. No chest pain, SOB, N/V/D, abd pain. Daughter osmani at bedside.    MEDICATIONS  (STANDING):  atorvastatin 40 milliGRAM(s) Oral at bedtime  cefTRIAXone   IVPB 2 Gram(s) IV Intermittent every 24 hours  cefTRIAXone   IVPB      dextrose 5%. 1000 milliLiter(s) (50 mL/Hr) IV Continuous <Continuous>  dextrose 50% Injectable 12.5 Gram(s) IV Push once  dextrose 50% Injectable 25 Gram(s) IV Push once  dextrose 50% Injectable 25 Gram(s) IV Push once  insulin lispro (HumaLOG) corrective regimen sliding scale   SubCutaneous three times a day before meals  insulin lispro (HumaLOG) corrective regimen sliding scale   SubCutaneous at bedtime  lactated ringers. 1000 milliLiter(s) (75 mL/Hr) IV Continuous <Continuous>  lidocaine   Patch 1 Patch Transdermal daily  pantoprazole    Tablet 40 milliGRAM(s) Oral two times a day  sucralfate 1 Gram(s) Oral four times a day    MEDICATIONS  (PRN):  acetaminophen   Tablet 650 milliGRAM(s) Oral every 6 hours PRN For Temp greater than 38 C (100.4 F)  acetaminophen   Tablet. 650 milliGRAM(s) Oral every 6 hours PRN Mild Pain (1 - 3)  dextrose Gel 1 Dose(s) Oral once PRN Blood Glucose LESS THAN 70 milliGRAM(s)/deciliter  glucagon  Injectable 1 milliGRAM(s) IntraMuscular once PRN Glucose LESS THAN 70 milligrams/deciliter  morphine  - Injectable 2 milliGRAM(s) IV Push every 4 hours PRN Severe Pain (7 - 10)  ondansetron Injectable 4 milliGRAM(s) IV Push every 6 hours PRN Nausea and/or Vomiting  oxyCODONE    IR 5 milliGRAM(s) Oral every 4 hours PRN Moderate Pain (4 - 6)      T(C): 36.9 (04-16-18 @ 09:52), Max: 37.4 (04-16-18 @ 01:03)  HR: 85 (04-16-18 @ 09:52) (82 - 90)  BP: 140/81 (04-16-18 @ 09:52) (133/87 - 141/81)  RR: 18 (04-16-18 @ 09:52) (16 - 18)  SpO2: 96% (04-16-18 @ 09:52) (96% - 99%)  CAPILLARY BLOOD GLUCOSE      POCT Blood Glucose.: 100 mg/dL (16 Apr 2018 08:55)  POCT Blood Glucose.: 109 mg/dL (15 Apr 2018 21:52)  POCT Blood Glucose.: 105 mg/dL (15 Apr 2018 18:08)  POCT Blood Glucose.: 134 mg/dL (15 Apr 2018 13:16)    I&O's Summary    15 Apr 2018 07:01  -  16 Apr 2018 07:00  --------------------------------------------------------  IN: 2140 mL / OUT: 800 mL / NET: 1340 mL        PHYSICAL EXAM:  GENERAL: no apparent distress, on room air  HEENT: sclera clear b/l  CHEST/LUNG: Clear to auscultation bilaterally; No wheezing or crackles  HEART: s1/s2, no murmurs appreciated  ABDOMEN: Soft, Nontender, Nondistended; Bowel sounds present  EXTREMITIES:  No clubbing, cyanosis, or edema  NEUROLOGY: awake, alert, responds to Qs appropriately, no gross focal deficits, moving all extremities, no sensory deficits  LABS:                        8.1    6.80  )-----------( 250      ( 16 Apr 2018 07:00 )             24.3     04-16    136  |  100  |  4<L>  ----------------------------<  99  3.5   |  24  |  0.88    Ca    7.6<L>      16 Apr 2018 07:00  Phos  3.9     04-16  Mg     1.5     04-16    TPro  5.8<L>  /  Alb  2.1<L>  /  TBili  0.3  /  DBili  x   /  AST  44<H>  /  ALT  31  /  AlkPhos  58  04-16    PTT - ( 15 Apr 2018 04:35 )  PTT:29.0 SEC          RADIOLOGY & ADDITIONAL TESTS:

## 2018-04-16 NOTE — CHART NOTE - NSCHARTNOTEFT_GEN_A_CORE
PRE-INTERVENTIONAL RADIOLOGY PROCEDURE NOTE    Patient Age: 68  Patient Gender: F  Procedure (including site / side if known): PICC  Diagnosis / Indication: Antibiotics  Interventional Radiology Attending Physician: n/a  Ordering Attending Physician: DR nowak  Pertinent medical history: DVT s/p IVC filter   Pertinent labs:   Patient and Family aware? Yes       Attending / Resident / NP / PA   Print Sign Carla Charles     Contact #: ___[id number]__________________

## 2018-04-16 NOTE — PROGRESS NOTE ADULT - ATTENDING COMMENTS
daughter: Ian: [phone]  sister: Dorys: [phone]    Dispo: pending insurance approval for IV abx completion, PT followup, GI followup Main point of contact by phone as per daughterDunia ([phone]) at bedside: Tori: [phone]    daughter: Ian: [phone]  sister: Dorys: [phone]    Dispo: pending insurance approval for IV abx completion to be done at home, PT recs: rehab but patient is medicaid pending Main point of contact by phone as per daughterDunia ([phone]) at bedside: Tori: [phone]    daughter: Ian: [phone]  sister: Dorys: [phone]    Dispo: pending IR PICC placement, insurance approval for IV abx completion to be done at home till May 21, PT recs: rehab but patient is medicaid pending

## 2018-04-16 NOTE — PROGRESS NOTE ADULT - ASSESSMENT
68 year old female with DM2, HTN, HLD - gastric ulcer presented to [facility] 4/5/18 for fever and back pain, was found to have L1-L4 left sided posterior epidural abscess with discitis and osteomyelitis.   urine cx: E. faecalis  CT showed psoas abscess with extensive DVT in b/l iliac and infrarenal IVC  Blood cx with aggregatibacter, now clear   HIV negative    Aggrigatebacter bacteremia, Left Epidural L1-L4 and psoas abscess with osteomyelitis and discitis,   extensive DVT in b/l iliac veins and infrarenal IVC s/p IVC filter but DERRICK with no vegetation but a large mobile mass in pulmonary artery protruding to the right pulm artery and CTA showed b/l PE  GI bleed with known gastric/ duodenal ulcers  Abscesses not drained    Recommend:  F/U quantiferon   Continue ceftriaxone 2 g q daily  Anticipate 6 weeks of ceftriaxone from the negative blood cx 4/8    Franco Chaudhry MD  Pager [phone]  After 5pm/weekends call [phone]

## 2018-04-16 NOTE — PROGRESS NOTE ADULT - ASSESSMENT
68 F  with DM2, HTN, HLD - gastric ulcer presented to [facility] 4/5/18 for fever and back pain, was found to have L1-L4 left sided posterior epidural abscess with discitis and osteomyelitis. Course complicated by acute GIB ( s/p transfusion 4 u prbc and 1 u plts) and finding of infrarenal IVC thrombus and b/l common illiac thrombus s/p IVC filter. Now found to have acute PE and started on heparin drip 68 F  with DM2, HTN, HLD - gastric ulcer presented to [facility] 4/5/18 for fever and back pain, was found to have L1-L4 left sided posterior epidural abscess with discitis and osteomyelitis. Course complicated by acute GIB ( s/p transfusion 4 u prbc and 1 u plts) and finding of infrarenal IVC thrombus and b/l common illiac thrombus s/p IVC filter. Now found to have acute PE and started on heparin drip but developed melena and heparin drip dced. Currently hemodynamically stable

## 2018-04-17 ENCOUNTER — CHART COPY (OUTPATIENT)
Age: 68
End: 2018-04-17

## 2018-04-17 LAB
BUN SERPL-MCNC: 4 MG/DL — LOW (ref 7–23)
CALCIUM SERPL-MCNC: 7.7 MG/DL — LOW (ref 8.4–10.5)
CHLORIDE SERPL-SCNC: 98 MMOL/L — SIGNIFICANT CHANGE UP (ref 98–107)
CO2 SERPL-SCNC: 26 MMOL/L — SIGNIFICANT CHANGE UP (ref 22–31)
CREAT SERPL-MCNC: 0.9 MG/DL — SIGNIFICANT CHANGE UP (ref 0.5–1.3)
GLUCOSE SERPL-MCNC: 119 MG/DL — HIGH (ref 70–99)
HCT VFR BLD CALC: 24.7 % — LOW (ref 34.5–45)
HGB BLD-MCNC: 8.2 G/DL — LOW (ref 11.5–15.5)
MCHC RBC-ENTMCNC: 29.8 PG — SIGNIFICANT CHANGE UP (ref 27–34)
MCHC RBC-ENTMCNC: 33.2 % — SIGNIFICANT CHANGE UP (ref 32–36)
MCV RBC AUTO: 89.8 FL — SIGNIFICANT CHANGE UP (ref 80–100)
NRBC # FLD: 0 — SIGNIFICANT CHANGE UP
PLATELET # BLD AUTO: 256 K/UL — SIGNIFICANT CHANGE UP (ref 150–400)
PMV BLD: 8.6 FL — SIGNIFICANT CHANGE UP (ref 7–13)
POTASSIUM SERPL-MCNC: 3.2 MMOL/L — LOW (ref 3.5–5.3)
POTASSIUM SERPL-SCNC: 3.2 MMOL/L — LOW (ref 3.5–5.3)
RBC # BLD: 2.75 M/UL — LOW (ref 3.8–5.2)
RBC # FLD: 14.5 % — SIGNIFICANT CHANGE UP (ref 10.3–14.5)
SODIUM SERPL-SCNC: 135 MMOL/L — SIGNIFICANT CHANGE UP (ref 135–145)
WBC # BLD: 5.76 K/UL — SIGNIFICANT CHANGE UP (ref 3.8–10.5)
WBC # FLD AUTO: 5.76 K/UL — SIGNIFICANT CHANGE UP (ref 3.8–10.5)

## 2018-04-17 PROCEDURE — 99232 SBSQ HOSP IP/OBS MODERATE 35: CPT

## 2018-04-17 PROCEDURE — 77001 FLUOROGUIDE FOR VEIN DEVICE: CPT | Mod: 26,GC

## 2018-04-17 PROCEDURE — 76937 US GUIDE VASCULAR ACCESS: CPT | Mod: 26

## 2018-04-17 PROCEDURE — 36569 INSJ PICC 5 YR+ W/O IMAGING: CPT

## 2018-04-17 PROCEDURE — 99232 SBSQ HOSP IP/OBS MODERATE 35: CPT | Mod: GC

## 2018-04-17 PROCEDURE — 99233 SBSQ HOSP IP/OBS HIGH 50: CPT

## 2018-04-17 RX ORDER — PANTOPRAZOLE SODIUM 20 MG/1
40 TABLET, DELAYED RELEASE ORAL
Qty: 0 | Refills: 0 | Status: DISCONTINUED | OUTPATIENT
Start: 2018-04-17 | End: 2018-04-22

## 2018-04-17 RX ORDER — OXYCODONE HYDROCHLORIDE 5 MG/1
5 TABLET ORAL ONCE
Qty: 0 | Refills: 0 | Status: DISCONTINUED | OUTPATIENT
Start: 2018-04-17 | End: 2018-04-18

## 2018-04-17 RX ORDER — POTASSIUM CHLORIDE 20 MEQ
40 PACKET (EA) ORAL ONCE
Qty: 0 | Refills: 0 | Status: COMPLETED | OUTPATIENT
Start: 2018-04-17 | End: 2018-04-17

## 2018-04-17 RX ORDER — CHLORHEXIDINE GLUCONATE 213 G/1000ML
1 SOLUTION TOPICAL
Qty: 0 | Refills: 0 | Status: DISCONTINUED | OUTPATIENT
Start: 2018-04-17 | End: 2018-04-22

## 2018-04-17 RX ORDER — POTASSIUM CHLORIDE 20 MEQ
40 PACKET (EA) ORAL ONCE
Qty: 0 | Refills: 0 | Status: DISCONTINUED | OUTPATIENT
Start: 2018-04-17 | End: 2018-04-17

## 2018-04-17 RX ADMIN — Medication 1 GRAM(S): at 06:06

## 2018-04-17 RX ADMIN — LIDOCAINE 1 PATCH: 4 CREAM TOPICAL at 11:21

## 2018-04-17 RX ADMIN — LIDOCAINE 1 PATCH: 4 CREAM TOPICAL at 23:51

## 2018-04-17 RX ADMIN — SODIUM CHLORIDE 75 MILLILITER(S): 9 INJECTION, SOLUTION INTRAVENOUS at 06:06

## 2018-04-17 RX ADMIN — Medication 650 MILLIGRAM(S): at 11:21

## 2018-04-17 RX ADMIN — PANTOPRAZOLE SODIUM 40 MILLIGRAM(S): 20 TABLET, DELAYED RELEASE ORAL at 06:06

## 2018-04-17 RX ADMIN — Medication 1 GRAM(S): at 17:00

## 2018-04-17 RX ADMIN — CEFTRIAXONE 100 GRAM(S): 500 INJECTION, POWDER, FOR SOLUTION INTRAMUSCULAR; INTRAVENOUS at 11:21

## 2018-04-17 RX ADMIN — Medication 1 GRAM(S): at 23:53

## 2018-04-17 RX ADMIN — Medication 1 GRAM(S): at 11:21

## 2018-04-17 RX ADMIN — Medication 40 MILLIEQUIVALENT(S): at 16:39

## 2018-04-17 RX ADMIN — Medication 650 MILLIGRAM(S): at 12:11

## 2018-04-17 RX ADMIN — ATORVASTATIN CALCIUM 40 MILLIGRAM(S): 80 TABLET, FILM COATED ORAL at 21:51

## 2018-04-17 NOTE — PROGRESS NOTE ADULT - ATTENDING COMMENTS
Main point of contact by phone as per daughter, Dunia ([phone]): Tori (other daughter): [phone]    daughter: Ian: [phone]  sister: Dorys: [phone]    Dispo: pending IR PICC placement, insurance approval for IV abx completion to be done at home till May 21, PT recs: rehab but patient is medicaid pending

## 2018-04-17 NOTE — PROGRESS NOTE ADULT - PROBLEM SELECTOR PLAN 1
H/H now stable, s/p 1 PRBC over weekend, now off heparin drip  PO Protonix daily, avoid NSAIDs  EGD done 4/11 with Non-bleeding gastric ulcer with a clean ulcer base and one non-bleeding duodenal ulcer with a clean ulcer  base (Faraz Class III) which was benign appearing. F/U pathology  Repeat EGD in 4 weeks

## 2018-04-17 NOTE — PROGRESS NOTE ADULT - SUBJECTIVE AND OBJECTIVE BOX
Patient is a 68y old  Female who presents with a chief complaint of Lumbar epidural abscess (11 Apr 2018 18:09)      SUBJECTIVE / OVERNIGHT EVENTS: No acute events overnight. RN Kyara at bedside providing translation. Patient with no new complaints, feeling better overall. No chest pain, SOB, N/V, no further blood in stools. Per RN, daughter refusing PICC until further Qs answered and doesn't want patient to go for procedure until she comes in. Per CM, medicaid may be able to be activated and patient may be able to go to rehab after all.    MEDICATIONS  (STANDING):  atorvastatin 40 milliGRAM(s) Oral at bedtime  cefTRIAXone   IVPB 2 Gram(s) IV Intermittent every 24 hours  cefTRIAXone   IVPB      dextrose 5%. 1000 milliLiter(s) (50 mL/Hr) IV Continuous <Continuous>  dextrose 50% Injectable 12.5 Gram(s) IV Push once  dextrose 50% Injectable 25 Gram(s) IV Push once  dextrose 50% Injectable 25 Gram(s) IV Push once  insulin lispro (HumaLOG) corrective regimen sliding scale   SubCutaneous three times a day before meals  insulin lispro (HumaLOG) corrective regimen sliding scale   SubCutaneous at bedtime  lidocaine   Patch 1 Patch Transdermal daily  pantoprazole    Tablet 40 milliGRAM(s) Oral before breakfast  potassium chloride   Solution 40 milliEquivalent(s) Oral once  sucralfate 1 Gram(s) Oral four times a day    MEDICATIONS  (PRN):  acetaminophen   Tablet 650 milliGRAM(s) Oral every 6 hours PRN For Temp greater than 38 C (100.4 F)  acetaminophen   Tablet. 650 milliGRAM(s) Oral every 6 hours PRN Mild Pain (1 - 3)  dextrose Gel 1 Dose(s) Oral once PRN Blood Glucose LESS THAN 70 milliGRAM(s)/deciliter  glucagon  Injectable 1 milliGRAM(s) IntraMuscular once PRN Glucose LESS THAN 70 milligrams/deciliter  ondansetron Injectable 4 milliGRAM(s) IV Push every 6 hours PRN Nausea and/or Vomiting      T(C): 36.9 (04-17-18 @ 06:04), Max: 37.2 (04-16-18 @ 20:45)  HR: 82 (04-17-18 @ 06:04) (82 - 98)  BP: 144/95 (04-17-18 @ 06:04) (130/82 - 146/83)  RR: 17 (04-17-18 @ 06:04) (17 - 18)  SpO2: 99% (04-17-18 @ 06:04) (96% - 99%)  CAPILLARY BLOOD GLUCOSE      POCT Blood Glucose.: 115 mg/dL (16 Apr 2018 22:12)  POCT Blood Glucose.: 139 mg/dL (16 Apr 2018 18:27)  POCT Blood Glucose.: 117 mg/dL (16 Apr 2018 13:00)    I&O's Summary    16 Apr 2018 07:01  -  17 Apr 2018 07:00  --------------------------------------------------------  IN: 1800 mL / OUT: 0 mL / NET: 1800 mL    PHYSICAL EXAM:  GENERAL: no apparent distress, on room air  HEENT: sclera clear b/l  CHEST/LUNG: Clear to auscultation bilaterally; No wheezing or crackles  HEART: s1/s2, no murmurs appreciated  ABDOMEN: Soft, Nontender, Nondistended; Bowel sounds present  EXTREMITIES:  No clubbing, cyanosis, or edema  NEUROLOGY: awake, alert, responds to Qs appropriately, no gross focal deficits, moving all extremities, no sensory deficits    LABS:                        8.2    5.76  )-----------( 256      ( 17 Apr 2018 05:46 )             24.7     04-17    135  |  98  |  4<L>  ----------------------------<  119<H>  3.2<L>   |  26  |  0.90    Ca    7.7<L>      17 Apr 2018 05:46  Phos  3.9     04-16  Mg     1.5     04-16    TPro  5.8<L>  /  Alb  2.1<L>  /  TBili  0.3  /  DBili  x   /  AST  44<H>  /  ALT  31  /  AlkPhos  58  04-16              RADIOLOGY & ADDITIONAL TESTS:

## 2018-04-17 NOTE — PROGRESS NOTE ADULT - PROBLEM SELECTOR PLAN 2
? underlying malignancy: recommended age appropriate cancer screening with mammogram and colonoscopy  CT A/P: thrombus in infrarenal IVC and b/l common iliac veins s/p IVC filter placed by IR on 4/10 given recent GIB but then found to have acute PE on CTA 4/12.   Now off heparin drip due to melena, H/H now stable, s/p 1 U PRBC

## 2018-04-17 NOTE — DIETITIAN INITIAL EVALUATION ADULT. - OTHER INFO
Pt seen for LOS, 67 Y/O female admitted with the DX of epidural hemorrhage, DM2, HTN, reports she has not   been eating well at home  related with  back pain ,but  now she is feeling better, no significant weight changes  as per daughter, No n/v/d   at  this  time, Labs reviewed, monitor potassium level. Current diet remains appropriate, nutrition education provided in [place], Food choices discussed with daughter  and with the Pt. RD remains available, pt made aware.

## 2018-04-17 NOTE — CHART NOTE - NSCHARTNOTEFT_GEN_A_CORE
Gi attending note    Hgb ~8. Stable. Last transfusion 4/14.   No further melena. In absence of significant overt GI bleed, would not repeat endoscopic procedures urgently.    Recs:   PPI daily  HgB >7  Follow up pathology results and treat if H pylori positive  Avoid NSAIDS if possible  Will need repeat EGD in 4 weeks.   If EGD needed, will need anesthesia risk stratification given PEs  will sign off.     25 min spent in pt care, >50% in care coordination/counseling discussing management of GI bleed.

## 2018-04-17 NOTE — PROGRESS NOTE ADULT - ASSESSMENT
68 F  with DM2, HTN, HLD - gastric ulcer presented to [facility] 4/5/18 for fever and back pain, was found to have L1-L4 left sided posterior epidural abscess with discitis and osteomyelitis. Course complicated by acute GIB ( s/p transfusion 4 u prbc and 1 u plts) and finding of infrarenal IVC thrombus and b/l common illiac thrombus s/p IVC filter. Now found to have acute PE and started on heparin drip but developed melena and heparin drip dced. Currently hemodynamically stable

## 2018-04-17 NOTE — PROGRESS NOTE ADULT - SUBJECTIVE AND OBJECTIVE BOX
CC: Patient is a 68y old  Female who presents with a chief complaint of Lumbar epidural abscess     Interval History/ROS: Patient remains with back pain, improving. Denies fever, chills, chest pain, SOB, abd pain, N/V/D/C.    Allergies  No Known Allergies    ANTIMICROBIALS:  cefTRIAXone   IVPB 2 every 24 hours  cefTRIAXone   IVPB      PE:    Vital Signs Last 24 Hrs  T(C): 36.8 (17 Apr 2018 10:16), Max: 37.2 (16 Apr 2018 20:45)  T(F): 98.3 (17 Apr 2018 10:16), Max: 99 (16 Apr 2018 20:45)  HR: 102 (17 Apr 2018 10:16) (82 - 102)  BP: 138/82 (17 Apr 2018 10:16) (130/82 - 146/83)  BP(mean): --  RR: 17 (17 Apr 2018 10:16) (17 - 18)  SpO2: 100% (17 Apr 2018 10:16) (96% - 100%)    Gen: AOx3, NAD, non-toxic, pleasant  CV: S1+S2 normal, no murmurs  Resp: Clear bilat, no resp distress  Abd: Soft, nontender, +BS  Ext: No LE edema, no wounds  : No Pretty  IV/Skin: No thrombophlebitis  Neuro: no focal deficits  MSK: Lower back tenderness    LABS:                          8.2    5.76  )-----------( 256      ( 17 Apr 2018 05:46 )             24.7       04-17    135  |  98  |  4<L>  ----------------------------<  119<H>  3.2<L>   |  26  |  0.90    Ca    7.7<L>      17 Apr 2018 05:46  Phos  3.9     04-16  Mg     1.5     04-16    TPro  5.8<L>  /  Alb  2.1<L>  /  TBili  0.3  /  DBili  x   /  AST  44<H>  /  ALT  31  /  AlkPhos  58  04-16          MICROBIOLOGY:  v  BLOOD PERIPHERAL  04-11-18 --  --  --      BLOOD  04-11-18 --  --  --      BLOOD  04-08-18 --  --  --      .Blood Blood  04-06-18   Growth in aerobic bottle: Aggregatibacter aphrophilus  Susceptibilites not performed.  --  --      .Blood Blood  04-06-18   Growth in aerobic bottle: Aggregatibacter aphrophilus  "Susceptibilities not performed"  "Due to technical problems, Proteus sp. will Not be reported as part of  the BCID panel until further notice"  ***Blood Panel PCR results on this specimen are available  approximately 3 hours after the Gram stain result.***  Gram stain, PCR, and/or culture results may not always  correspond due to difference in methodologies.  ************************************************************  This PCR assay was performed using [employer].  The following targets are tested for: Enterococcus,  vancomycin resistant enterococci, Listeria monocytogenes,  coagulase negative staphylococci, S. aureus,  methicillin resistant S. aureus, Streptococcus agalactiae  (Group B), S. pneumoniae, S. pyogenes (Group A),  Acinetobacter baumannii, Enterobacter cloacae, E. coli,  Klebsiella oxytoca, K. pneumoniae, Proteus sp.,  Serratia marcescens, Haemophilus influenzae,  Neisseria meningitidis, Pseudomonas aeruginosa,Candida  albicans, C. glabrata, C krusei, C parapsilosis,  C. tropicalis and the KPC resistance gene.  --  Blood Culture PCR      .Blood Blood  04-04-18   No growth at 5 days.  --  --      .Urine Clean Catch (Midstream)  04-04-18   10,000 - 49,000 CFU/mL Enterococcus faecalis  --  Enterococcus faecalis      .Urine Clean Catch (Midstream)  03-25-18   No growth  --  --    RADIOLOGY:    no new images.

## 2018-04-17 NOTE — PROGRESS NOTE ADULT - PROBLEM SELECTOR PLAN 3
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 Per IR attending, abscesses not in the location or large enough to be drained  CTX for 6 weeks from the negative blood cx from 4/8-5/21/18 4/6 blood cx with aggregatibacter, DERRICK neg for endocarditis but + PE, HIV negative,  Not requiring opiates therefore will DC, continue lidocaine patch and Tylenol PRN

## 2018-04-17 NOTE — PROGRESS NOTE ADULT - ASSESSMENT
68 year old female with DM2, HTN, HLD - gastric ulcer presented to [facility] 4/5/18 for fever and back pain, was found to have L1-L4 left sided posterior epidural abscess with discitis and osteomyelitis.   urine cx: E. faecalis  CT showed psoas abscess with extensive DVT in b/l iliac and infrarenal IVC  Blood cx with aggregatibacter, now clear   HIV negative    Aggrigatebacter bacteremia, Left Epidural L1-L4 and psoas abscess with osteomyelitis and discitis,   extensive DVT in b/l iliac veins and infrarenal IVC s/p IVC filter but DERRICK with no vegetation but a large mobile mass in pulmonary artery protruding to the right pulm artery and CTA showed b/l PE  GI bleed with known gastric/ duodenal ulcers  Abscesses not drained  Afebrile, nontoxic.    Recommend:  F/U quantiferon   Continue ceftriaxone 2 g q daily  Anticipate 6 weeks of ceftriaxone to complete 5/20/18.  Will need weekly CBC/ CMP/ ESR/ CRP.  Patient to followup in ID clinic with Dr. Salmeron in 4 weeks. For appointments, can call [phone].    Please call with questions.    Franco Chaudhry MD  Pager [phone]  After 5pm/weekends call [phone]

## 2018-04-18 LAB
BUN SERPL-MCNC: 5 MG/DL — LOW (ref 7–23)
CALCIUM SERPL-MCNC: 7.7 MG/DL — LOW (ref 8.4–10.5)
CHLORIDE SERPL-SCNC: 95 MMOL/L — LOW (ref 98–107)
CO2 SERPL-SCNC: 23 MMOL/L — SIGNIFICANT CHANGE UP (ref 22–31)
CREAT SERPL-MCNC: 0.82 MG/DL — SIGNIFICANT CHANGE UP (ref 0.5–1.3)
GLUCOSE SERPL-MCNC: 100 MG/DL — HIGH (ref 70–99)
HCT VFR BLD CALC: 24.5 % — LOW (ref 34.5–45)
HGB BLD-MCNC: 8 G/DL — LOW (ref 11.5–15.5)
MCHC RBC-ENTMCNC: 29.5 PG — SIGNIFICANT CHANGE UP (ref 27–34)
MCHC RBC-ENTMCNC: 32.7 % — SIGNIFICANT CHANGE UP (ref 32–36)
MCV RBC AUTO: 90.4 FL — SIGNIFICANT CHANGE UP (ref 80–100)
NRBC # FLD: 0 — SIGNIFICANT CHANGE UP
PLATELET # BLD AUTO: 244 K/UL — SIGNIFICANT CHANGE UP (ref 150–400)
PMV BLD: 9.2 FL — SIGNIFICANT CHANGE UP (ref 7–13)
POTASSIUM SERPL-MCNC: 3.6 MMOL/L — SIGNIFICANT CHANGE UP (ref 3.5–5.3)
POTASSIUM SERPL-SCNC: 3.6 MMOL/L — SIGNIFICANT CHANGE UP (ref 3.5–5.3)
RBC # BLD: 2.71 M/UL — LOW (ref 3.8–5.2)
RBC # FLD: 14.8 % — HIGH (ref 10.3–14.5)
SODIUM SERPL-SCNC: 132 MMOL/L — LOW (ref 135–145)
WBC # BLD: 6.13 K/UL — SIGNIFICANT CHANGE UP (ref 3.8–10.5)
WBC # FLD AUTO: 6.13 K/UL — SIGNIFICANT CHANGE UP (ref 3.8–10.5)

## 2018-04-18 PROCEDURE — 99233 SBSQ HOSP IP/OBS HIGH 50: CPT

## 2018-04-18 RX ADMIN — OXYCODONE HYDROCHLORIDE 5 MILLIGRAM(S): 5 TABLET ORAL at 02:00

## 2018-04-18 RX ADMIN — Medication 1 GRAM(S): at 12:47

## 2018-04-18 RX ADMIN — CEFTRIAXONE 100 GRAM(S): 500 INJECTION, POWDER, FOR SOLUTION INTRAMUSCULAR; INTRAVENOUS at 12:46

## 2018-04-18 RX ADMIN — OXYCODONE HYDROCHLORIDE 5 MILLIGRAM(S): 5 TABLET ORAL at 01:24

## 2018-04-18 RX ADMIN — Medication 1 GRAM(S): at 23:51

## 2018-04-18 RX ADMIN — ATORVASTATIN CALCIUM 40 MILLIGRAM(S): 80 TABLET, FILM COATED ORAL at 21:03

## 2018-04-18 RX ADMIN — PANTOPRAZOLE SODIUM 40 MILLIGRAM(S): 20 TABLET, DELAYED RELEASE ORAL at 06:56

## 2018-04-18 RX ADMIN — Medication 1 GRAM(S): at 18:15

## 2018-04-18 RX ADMIN — CHLORHEXIDINE GLUCONATE 1 APPLICATION(S): 213 SOLUTION TOPICAL at 10:35

## 2018-04-18 RX ADMIN — LIDOCAINE 1 PATCH: 4 CREAM TOPICAL at 12:47

## 2018-04-18 RX ADMIN — Medication 1 GRAM(S): at 05:22

## 2018-04-18 NOTE — PROGRESS NOTE ADULT - PROBLEM SELECTOR PLAN 3
? underlying malignancy: recommended age appropriate cancer screening with mammogram and colonoscopy  CT A/P: thrombus in infrarenal IVC and b/l common iliac veins s/p IVC filter placed by IR on 4/10 given recent GIB but then found to have acute PE on CTA 4/12.   Now off heparin drip due to melena, H/H now stable, s/p 1 U PRBC here

## 2018-04-18 NOTE — PROGRESS NOTE ADULT - ATTENDING COMMENTS
Main point of contact by phone as per daughter, Dunia ([phone]): Tori (other daughter): [phone]    daughter: Ian: [phone]  sister: Dorys: [phone]    Dispo: pending approval for home IV abx completion to be completed 5/20. PT recs: rehab but patient is medicaid pending Main point of contact by phone as per daughterDunia ([phone]): Tori (other daughter): [phone]    daughter: Ian: [phone]  sister: Dorys: [phone]    Dispo: pending activation of insurance possibly today or tmw per CM and patient can likely go to rehab then to complete Abx 5/20. PT recs: rehab

## 2018-04-18 NOTE — PROGRESS NOTE ADULT - ASSESSMENT
68 F  with DM2, HTN, HLD - gastric ulcer presented to [facility] 4/5/18 for fever and back pain, was found to have L1-L4 left sided posterior epidural abscess with discitis and osteomyelitis. Course complicated by acute GIB ( s/p transfusion 4 u prbc and 1 u plts) and finding of infrarenal IVC thrombus and b/l common illiac thrombus s/p IVC filter. Now found to have acute PE and started on heparin drip but developed melena and heparin drip dced. Currently hemodynamically stable

## 2018-04-18 NOTE — PROGRESS NOTE ADULT - PROBLEM SELECTOR PLAN 1
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 Per IR attending, abscesses not in the location or large enough to be drained  CTX for 6 weeks from the negative blood cx from 4/8-5/20/18  Weekly ESR, CRP, CBC, CMP  4/6 blood cx with aggregatibacter, DERRICK neg for endocarditis but + PE, HIV negative,  continue lidocaine patch and Tylenol PRN

## 2018-04-18 NOTE — PROGRESS NOTE ADULT - SUBJECTIVE AND OBJECTIVE BOX
Patient is a 68y old  Female who presents with a chief complaint of Lumbar epidural abscess (11 Apr 2018 18:09)      SUBJECTIVE / OVERNIGHT EVENTS: No acute events overnight. No new complaints. Feels well.  [id number] used to explain to patient and son at bedside current plan.    MEDICATIONS  (STANDING):  atorvastatin 40 milliGRAM(s) Oral at bedtime  cefTRIAXone   IVPB 2 Gram(s) IV Intermittent every 24 hours  cefTRIAXone   IVPB      chlorhexidine 4% Liquid 1 Application(s) Topical <User Schedule>  dextrose 5%. 1000 milliLiter(s) (50 mL/Hr) IV Continuous <Continuous>  dextrose 50% Injectable 12.5 Gram(s) IV Push once  dextrose 50% Injectable 25 Gram(s) IV Push once  dextrose 50% Injectable 25 Gram(s) IV Push once  insulin lispro (HumaLOG) corrective regimen sliding scale   SubCutaneous three times a day before meals  insulin lispro (HumaLOG) corrective regimen sliding scale   SubCutaneous at bedtime  lidocaine   Patch 1 Patch Transdermal daily  pantoprazole    Tablet 40 milliGRAM(s) Oral before breakfast  sucralfate 1 Gram(s) Oral four times a day    MEDICATIONS  (PRN):  acetaminophen   Tablet 650 milliGRAM(s) Oral every 6 hours PRN For Temp greater than 38 C (100.4 F)  acetaminophen   Tablet. 650 milliGRAM(s) Oral every 6 hours PRN Mild Pain (1 - 3)  dextrose Gel 1 Dose(s) Oral once PRN Blood Glucose LESS THAN 70 milliGRAM(s)/deciliter  glucagon  Injectable 1 milliGRAM(s) IntraMuscular once PRN Glucose LESS THAN 70 milligrams/deciliter  ondansetron Injectable 4 milliGRAM(s) IV Push every 6 hours PRN Nausea and/or Vomiting      T(C): 36.8 (04-18-18 @ 10:02), Max: 37.1 (04-18-18 @ 05:20)  HR: 97 (04-18-18 @ 10:02) (72 - 97)  BP: 133/90 (04-18-18 @ 10:02) (108/69 - 151/94)  RR: 18 (04-18-18 @ 10:02) (17 - 18)  SpO2: 97% (04-18-18 @ 10:02) (97% - 100%)  CAPILLARY BLOOD GLUCOSE      POCT Blood Glucose.: 112 mg/dL (18 Apr 2018 12:56)  POCT Blood Glucose.: 113 mg/dL (18 Apr 2018 08:38)  POCT Blood Glucose.: 119 mg/dL (17 Apr 2018 21:14)  POCT Blood Glucose.: 119 mg/dL (17 Apr 2018 17:53)  POCT Blood Glucose.: 117 mg/dL (17 Apr 2018 13:10)    I&O's Summary    17 Apr 2018 07:01  -  18 Apr 2018 07:00  --------------------------------------------------------  IN: 200 mL / OUT: 300 mL / NET: -100 mL    PHYSICAL EXAM:  GENERAL: no apparent distress, on room air  HEENT: sclera clear b/l  CHEST/LUNG: Clear to auscultation bilaterally; No wheezing or crackles  HEART: s1/s2, no murmurs appreciated  ABDOMEN: Soft, Nontender, Nondistended; Bowel sounds present  EXTREMITIES:  No clubbing, cyanosis, or edema  VASCULAR: LUE PICC C/D/I  NEUROLOGY: awake, alert, responds to Qs appropriately, no gross focal deficits, moving all extremities, no sensory deficits    LABS:                        8.0    6.13  )-----------( 244      ( 18 Apr 2018 06:00 )             24.5     04-18    132<L>  |  95<L>  |  5<L>  ----------------------------<  100<H>  3.6   |  23  |  0.82    Ca    7.7<L>      18 Apr 2018 06:00                RADIOLOGY & ADDITIONAL TESTS:

## 2018-04-19 LAB
BASOPHILS # BLD AUTO: 0.05 K/UL — SIGNIFICANT CHANGE UP (ref 0–0.2)
BASOPHILS NFR BLD AUTO: 0.8 % — SIGNIFICANT CHANGE UP (ref 0–2)
BUN SERPL-MCNC: 6 MG/DL — LOW (ref 7–23)
CALCIUM SERPL-MCNC: 7.7 MG/DL — LOW (ref 8.4–10.5)
CHLORIDE SERPL-SCNC: 97 MMOL/L — LOW (ref 98–107)
CO2 SERPL-SCNC: 23 MMOL/L — SIGNIFICANT CHANGE UP (ref 22–31)
CREAT SERPL-MCNC: 0.84 MG/DL — SIGNIFICANT CHANGE UP (ref 0.5–1.3)
EOSINOPHIL # BLD AUTO: 0.07 K/UL — SIGNIFICANT CHANGE UP (ref 0–0.5)
EOSINOPHIL NFR BLD AUTO: 1.2 % — SIGNIFICANT CHANGE UP (ref 0–6)
GLUCOSE SERPL-MCNC: 106 MG/DL — HIGH (ref 70–99)
HCT VFR BLD CALC: 26.4 % — LOW (ref 34.5–45)
HGB BLD-MCNC: 8.3 G/DL — LOW (ref 11.5–15.5)
IMM GRANULOCYTES # BLD AUTO: 0.06 # — SIGNIFICANT CHANGE UP
IMM GRANULOCYTES NFR BLD AUTO: 1 % — SIGNIFICANT CHANGE UP (ref 0–1.5)
LYMPHOCYTES # BLD AUTO: 2.11 K/UL — SIGNIFICANT CHANGE UP (ref 1–3.3)
LYMPHOCYTES # BLD AUTO: 34.8 % — SIGNIFICANT CHANGE UP (ref 13–44)
MCHC RBC-ENTMCNC: 29 PG — SIGNIFICANT CHANGE UP (ref 27–34)
MCHC RBC-ENTMCNC: 31.4 % — LOW (ref 32–36)
MCV RBC AUTO: 92.3 FL — SIGNIFICANT CHANGE UP (ref 80–100)
MONOCYTES # BLD AUTO: 0.74 K/UL — SIGNIFICANT CHANGE UP (ref 0–0.9)
MONOCYTES NFR BLD AUTO: 12.2 % — SIGNIFICANT CHANGE UP (ref 2–14)
NEUTROPHILS # BLD AUTO: 3.03 K/UL — SIGNIFICANT CHANGE UP (ref 1.8–7.4)
NEUTROPHILS NFR BLD AUTO: 50 % — SIGNIFICANT CHANGE UP (ref 43–77)
NRBC # FLD: 0 — SIGNIFICANT CHANGE UP
PLATELET # BLD AUTO: 260 K/UL — SIGNIFICANT CHANGE UP (ref 150–400)
PMV BLD: 9.1 FL — SIGNIFICANT CHANGE UP (ref 7–13)
POTASSIUM SERPL-MCNC: 3.5 MMOL/L — SIGNIFICANT CHANGE UP (ref 3.5–5.3)
POTASSIUM SERPL-SCNC: 3.5 MMOL/L — SIGNIFICANT CHANGE UP (ref 3.5–5.3)
RBC # BLD: 2.86 M/UL — LOW (ref 3.8–5.2)
RBC # FLD: 14.6 % — HIGH (ref 10.3–14.5)
SODIUM SERPL-SCNC: 133 MMOL/L — LOW (ref 135–145)
WBC # BLD: 6.06 K/UL — SIGNIFICANT CHANGE UP (ref 3.8–10.5)
WBC # FLD AUTO: 6.06 K/UL — SIGNIFICANT CHANGE UP (ref 3.8–10.5)

## 2018-04-19 PROCEDURE — 99233 SBSQ HOSP IP/OBS HIGH 50: CPT

## 2018-04-19 RX ORDER — OXYCODONE HYDROCHLORIDE 5 MG/1
5 TABLET ORAL ONCE
Qty: 0 | Refills: 0 | Status: DISCONTINUED | OUTPATIENT
Start: 2018-04-19 | End: 2018-04-19

## 2018-04-19 RX ADMIN — ATORVASTATIN CALCIUM 40 MILLIGRAM(S): 80 TABLET, FILM COATED ORAL at 22:30

## 2018-04-19 RX ADMIN — ONDANSETRON 4 MILLIGRAM(S): 8 TABLET, FILM COATED ORAL at 07:22

## 2018-04-19 RX ADMIN — Medication 1 GRAM(S): at 23:05

## 2018-04-19 RX ADMIN — CEFTRIAXONE 100 GRAM(S): 500 INJECTION, POWDER, FOR SOLUTION INTRAMUSCULAR; INTRAVENOUS at 11:21

## 2018-04-19 RX ADMIN — Medication 1 GRAM(S): at 11:21

## 2018-04-19 RX ADMIN — LIDOCAINE 1 PATCH: 4 CREAM TOPICAL at 00:28

## 2018-04-19 RX ADMIN — LIDOCAINE 1 PATCH: 4 CREAM TOPICAL at 11:21

## 2018-04-19 RX ADMIN — Medication 1 GRAM(S): at 18:15

## 2018-04-19 RX ADMIN — Medication 1 GRAM(S): at 05:27

## 2018-04-19 RX ADMIN — CHLORHEXIDINE GLUCONATE 1 APPLICATION(S): 213 SOLUTION TOPICAL at 11:37

## 2018-04-19 RX ADMIN — LIDOCAINE 1 PATCH: 4 CREAM TOPICAL at 23:05

## 2018-04-19 RX ADMIN — OXYCODONE HYDROCHLORIDE 5 MILLIGRAM(S): 5 TABLET ORAL at 03:56

## 2018-04-19 RX ADMIN — OXYCODONE HYDROCHLORIDE 5 MILLIGRAM(S): 5 TABLET ORAL at 04:48

## 2018-04-19 RX ADMIN — PANTOPRAZOLE SODIUM 40 MILLIGRAM(S): 20 TABLET, DELAYED RELEASE ORAL at 06:07

## 2018-04-19 NOTE — PROGRESS NOTE ADULT - PROBLEM SELECTOR PLAN 4
monitor FS QAC/HS, FS controlled, ISS  -A1c-6.9. monitor FS QAC/HS, FS controlled, ISS  -A1c-6.9.  Give metformin on DC

## 2018-04-19 NOTE — PROGRESS NOTE ADULT - ASSESSMENT
68 F  with DM2, HTN, HLD - gastric ulcer presented to [facility] 4/5/18 for fever and back pain, was found to have L1-L4 left sided posterior epidural abscess with discitis and osteomyelitis. Course complicated by acute GIB ( s/p transfusion 4 u prbc and 1 u plts) and finding of infrarenal IVC thrombus and b/l common illiac thrombus s/p IVC filter. Now found to have acute PE and started on heparin drip but developed melena and heparin drip dced. Currently hemodynamically stable

## 2018-04-19 NOTE — PROGRESS NOTE ADULT - ATTENDING COMMENTS
Main point of contact by phone as per daughter, Dunia ([phone]): Tori (other daughter): [phone]    daughter: Ian: [phone]  sister: Dorys: [phone]    Dispo: pending activation of insurance then patient can likely go to rehab then to complete Abx till 5/20. PT recs: rehab Main point of contact by phone as per daughter, Dunia ([phone]): Tori (other daughter): [phone]    daughter: Ian: [phone]  sister: Dorys: [phone]    Dispo: pending activation of insurance then patient can likely go to rehab then to complete Abx till 5/20. PT recs: rehab  Can do labs every other day now

## 2018-04-19 NOTE — PROGRESS NOTE ADULT - PROBLEM SELECTOR PLAN 2
H/H stable, no A/C, PO Protonix daily, avoid NSAIDs  EGD done 4/11 with Non-bleeding gastric ulcer with a clean ulcer base and one non-bleeding duodenal ulcer with a clean ulcer  base (Faraz Class III) which was benign appearing. gastric bx negative for H. pylori and dysplasia  Repeat EGD in 4 weeks

## 2018-04-19 NOTE — PROGRESS NOTE ADULT - SUBJECTIVE AND OBJECTIVE BOX
Patient is a 68y old  Female who presents with a chief complaint of Lumbar epidural abscess (11 Apr 2018 18:09)      SUBJECTIVE / OVERNIGHT EVENTS: No acute events overnight. No new complaints from patient. Daughter, Dunia at bedside. : [id number] used to explain current plan    MEDICATIONS  (STANDING):  atorvastatin 40 milliGRAM(s) Oral at bedtime  cefTRIAXone   IVPB 2 Gram(s) IV Intermittent every 24 hours  cefTRIAXone   IVPB      chlorhexidine 4% Liquid 1 Application(s) Topical <User Schedule>  dextrose 5%. 1000 milliLiter(s) (50 mL/Hr) IV Continuous <Continuous>  dextrose 50% Injectable 12.5 Gram(s) IV Push once  dextrose 50% Injectable 25 Gram(s) IV Push once  dextrose 50% Injectable 25 Gram(s) IV Push once  insulin lispro (HumaLOG) corrective regimen sliding scale   SubCutaneous three times a day before meals  insulin lispro (HumaLOG) corrective regimen sliding scale   SubCutaneous at bedtime  lidocaine   Patch 1 Patch Transdermal daily  pantoprazole    Tablet 40 milliGRAM(s) Oral before breakfast  sucralfate 1 Gram(s) Oral four times a day    MEDICATIONS  (PRN):  acetaminophen   Tablet 650 milliGRAM(s) Oral every 6 hours PRN For Temp greater than 38 C (100.4 F)  acetaminophen   Tablet. 650 milliGRAM(s) Oral every 6 hours PRN Mild Pain (1 - 3)  dextrose Gel 1 Dose(s) Oral once PRN Blood Glucose LESS THAN 70 milliGRAM(s)/deciliter  glucagon  Injectable 1 milliGRAM(s) IntraMuscular once PRN Glucose LESS THAN 70 milligrams/deciliter  ondansetron Injectable 4 milliGRAM(s) IV Push every 6 hours PRN Nausea and/or Vomiting      T(C): 36.8 (04-19-18 @ 09:49), Max: 36.9 (04-18-18 @ 14:18)  HR: 88 (04-19-18 @ 09:49) (84 - 96)  BP: 129/79 (04-19-18 @ 09:49) (122/82 - 136/97)  RR: 17 (04-19-18 @ 09:49) (17 - 18)  SpO2: 94% (04-19-18 @ 09:49) (94% - 99%)  CAPILLARY BLOOD GLUCOSE      POCT Blood Glucose.: 109 mg/dL (19 Apr 2018 09:08)  POCT Blood Glucose.: 131 mg/dL (18 Apr 2018 21:31)  POCT Blood Glucose.: 104 mg/dL (18 Apr 2018 18:04)  POCT Blood Glucose.: 112 mg/dL (18 Apr 2018 12:56)    I&O's Summary    18 Apr 2018 07:01  -  19 Apr 2018 07:00  --------------------------------------------------------  IN: 0 mL / OUT: 400 mL / NET: -400 mL    19 Apr 2018 07:01  -  19 Apr 2018 12:24  --------------------------------------------------------  IN: 50 mL / OUT: 0 mL / NET: 50 mL    PHYSICAL EXAM:  GENERAL: no apparent distress, on room air  HEENT: sclera clear b/l  CHEST/LUNG: Clear to auscultation bilaterally; No wheezing or crackles  HEART: s1/s2, no murmurs appreciated  ABDOMEN: Soft, Nontender, Nondistended; Bowel sounds present  EXTREMITIES:  No clubbing, cyanosis, or edema  VASCULAR: LUE PICC C/D/I  NEUROLOGY: awake, alert, responds to Qs appropriately, no gross focal deficits, moving all extremities, no sensory deficits    LABS:                        8.3    6.06  )-----------( 260      ( 19 Apr 2018 05:10 )             26.4     04-19    133<L>  |  97<L>  |  6<L>  ----------------------------<  106<H>  3.5   |  23  |  0.84    Ca    7.7<L>      19 Apr 2018 05:10                RADIOLOGY & ADDITIONAL TESTS:

## 2018-04-20 LAB
BUN SERPL-MCNC: 9 MG/DL — SIGNIFICANT CHANGE UP (ref 7–23)
CALCIUM SERPL-MCNC: 7.8 MG/DL — LOW (ref 8.4–10.5)
CHLORIDE SERPL-SCNC: 98 MMOL/L — SIGNIFICANT CHANGE UP (ref 98–107)
CO2 SERPL-SCNC: 24 MMOL/L — SIGNIFICANT CHANGE UP (ref 22–31)
CREAT SERPL-MCNC: 0.98 MG/DL — SIGNIFICANT CHANGE UP (ref 0.5–1.3)
GLUCOSE SERPL-MCNC: 112 MG/DL — HIGH (ref 70–99)
HCT VFR BLD CALC: 25.7 % — LOW (ref 34.5–45)
HGB BLD-MCNC: 8.2 G/DL — LOW (ref 11.5–15.5)
MCHC RBC-ENTMCNC: 28.8 PG — SIGNIFICANT CHANGE UP (ref 27–34)
MCHC RBC-ENTMCNC: 31.9 % — LOW (ref 32–36)
MCV RBC AUTO: 90.2 FL — SIGNIFICANT CHANGE UP (ref 80–100)
NRBC # FLD: 0 — SIGNIFICANT CHANGE UP
PLATELET # BLD AUTO: 242 K/UL — SIGNIFICANT CHANGE UP (ref 150–400)
PMV BLD: 9.3 FL — SIGNIFICANT CHANGE UP (ref 7–13)
POTASSIUM SERPL-MCNC: 3.6 MMOL/L — SIGNIFICANT CHANGE UP (ref 3.5–5.3)
POTASSIUM SERPL-SCNC: 3.6 MMOL/L — SIGNIFICANT CHANGE UP (ref 3.5–5.3)
RBC # BLD: 2.85 M/UL — LOW (ref 3.8–5.2)
RBC # FLD: 14.6 % — HIGH (ref 10.3–14.5)
SODIUM SERPL-SCNC: 135 MMOL/L — SIGNIFICANT CHANGE UP (ref 135–145)
WBC # BLD: 6.82 K/UL — SIGNIFICANT CHANGE UP (ref 3.8–10.5)
WBC # FLD AUTO: 6.82 K/UL — SIGNIFICANT CHANGE UP (ref 3.8–10.5)

## 2018-04-20 PROCEDURE — 99233 SBSQ HOSP IP/OBS HIGH 50: CPT

## 2018-04-20 RX ORDER — POTASSIUM CHLORIDE 20 MEQ
20 PACKET (EA) ORAL ONCE
Qty: 0 | Refills: 0 | Status: COMPLETED | OUTPATIENT
Start: 2018-04-20 | End: 2018-04-20

## 2018-04-20 RX ORDER — CEFTRIAXONE 500 MG/1
2 INJECTION, POWDER, FOR SOLUTION INTRAMUSCULAR; INTRAVENOUS
Qty: 0 | Refills: 0 | COMMUNITY
Start: 2018-04-20

## 2018-04-20 RX ORDER — PANTOPRAZOLE SODIUM 20 MG/1
1 TABLET, DELAYED RELEASE ORAL
Qty: 0 | Refills: 0 | COMMUNITY
Start: 2018-04-20

## 2018-04-20 RX ORDER — CEFTRIAXONE 500 MG/1
0 INJECTION, POWDER, FOR SOLUTION INTRAMUSCULAR; INTRAVENOUS
Qty: 0 | Refills: 0 | COMMUNITY
Start: 2018-04-20

## 2018-04-20 RX ORDER — SUCRALFATE 1 G
1 TABLET ORAL
Qty: 0 | Refills: 0 | COMMUNITY
Start: 2018-04-20

## 2018-04-20 RX ADMIN — Medication 1 GRAM(S): at 11:29

## 2018-04-20 RX ADMIN — PANTOPRAZOLE SODIUM 40 MILLIGRAM(S): 20 TABLET, DELAYED RELEASE ORAL at 06:47

## 2018-04-20 RX ADMIN — Medication 650 MILLIGRAM(S): at 11:15

## 2018-04-20 RX ADMIN — CEFTRIAXONE 100 GRAM(S): 500 INJECTION, POWDER, FOR SOLUTION INTRAMUSCULAR; INTRAVENOUS at 11:30

## 2018-04-20 RX ADMIN — Medication 650 MILLIGRAM(S): at 10:25

## 2018-04-20 RX ADMIN — LIDOCAINE 1 PATCH: 4 CREAM TOPICAL at 23:05

## 2018-04-20 RX ADMIN — Medication 20 MILLIEQUIVALENT(S): at 08:05

## 2018-04-20 RX ADMIN — LIDOCAINE 1 PATCH: 4 CREAM TOPICAL at 11:29

## 2018-04-20 RX ADMIN — CHLORHEXIDINE GLUCONATE 1 APPLICATION(S): 213 SOLUTION TOPICAL at 18:44

## 2018-04-20 RX ADMIN — ATORVASTATIN CALCIUM 40 MILLIGRAM(S): 80 TABLET, FILM COATED ORAL at 21:39

## 2018-04-20 RX ADMIN — ONDANSETRON 4 MILLIGRAM(S): 8 TABLET, FILM COATED ORAL at 18:56

## 2018-04-20 RX ADMIN — Medication 650 MILLIGRAM(S): at 18:56

## 2018-04-20 RX ADMIN — Medication 1 GRAM(S): at 05:46

## 2018-04-20 RX ADMIN — Medication 1 GRAM(S): at 23:19

## 2018-04-20 NOTE — PROGRESS NOTE ADULT - SUBJECTIVE AND OBJECTIVE BOX
Patient is a 68y old  Female who presents with a chief complaint of Lumbar epidural abscess (11 Apr 2018 18:09)      SUBJECTIVE / OVERNIGHT EVENTS: No acute events overnight. No new complaints. Awaiting rehab    MEDICATIONS  (STANDING):  atorvastatin 40 milliGRAM(s) Oral at bedtime  cefTRIAXone   IVPB 2 Gram(s) IV Intermittent every 24 hours  cefTRIAXone   IVPB      chlorhexidine 4% Liquid 1 Application(s) Topical <User Schedule>  dextrose 5%. 1000 milliLiter(s) (50 mL/Hr) IV Continuous <Continuous>  dextrose 50% Injectable 12.5 Gram(s) IV Push once  dextrose 50% Injectable 25 Gram(s) IV Push once  dextrose 50% Injectable 25 Gram(s) IV Push once  insulin lispro (HumaLOG) corrective regimen sliding scale   SubCutaneous three times a day before meals  insulin lispro (HumaLOG) corrective regimen sliding scale   SubCutaneous at bedtime  lidocaine   Patch 1 Patch Transdermal daily  pantoprazole    Tablet 40 milliGRAM(s) Oral before breakfast  sucralfate 1 Gram(s) Oral four times a day    MEDICATIONS  (PRN):  acetaminophen   Tablet 650 milliGRAM(s) Oral every 6 hours PRN For Temp greater than 38 C (100.4 F)  acetaminophen   Tablet. 650 milliGRAM(s) Oral every 6 hours PRN Mild Pain (1 - 3)  dextrose Gel 1 Dose(s) Oral once PRN Blood Glucose LESS THAN 70 milliGRAM(s)/deciliter  glucagon  Injectable 1 milliGRAM(s) IntraMuscular once PRN Glucose LESS THAN 70 milligrams/deciliter  ondansetron Injectable 4 milliGRAM(s) IV Push every 6 hours PRN Nausea and/or Vomiting      T(C): 36.9 (04-20-18 @ 10:12), Max: 37.1 (04-20-18 @ 01:28)  HR: 83 (04-20-18 @ 10:12) (83 - 91)  BP: 127/89 (04-20-18 @ 10:12) (111/71 - 133/85)  RR: 17 (04-20-18 @ 10:12) (17 - 19)  SpO2: 98% (04-20-18 @ 10:12) (95% - 98%)  CAPILLARY BLOOD GLUCOSE      POCT Blood Glucose.: 99 mg/dL (20 Apr 2018 08:55)  POCT Blood Glucose.: 152 mg/dL (19 Apr 2018 21:24)  POCT Blood Glucose.: 117 mg/dL (19 Apr 2018 18:02)  POCT Blood Glucose.: 111 mg/dL (19 Apr 2018 12:57)    I&O's Summary    19 Apr 2018 07:01  -  20 Apr 2018 07:00  --------------------------------------------------------  IN: 50 mL / OUT: 0 mL / NET: 50 mL        PHYSICAL EXAM:  GENERAL: no apparent distress, on room air  HEENT: sclera clear b/l  CHEST/LUNG: Clear to auscultation bilaterally; No wheezing or crackles  HEART: s1/s2, no murmurs appreciated  ABDOMEN: Soft, Nontender, Nondistended; Bowel sounds present  EXTREMITIES:  No clubbing, cyanosis, or edema  VASCULAR: LUE PICC C/D/I  NEUROLOGY: awake, alert, responds to Qs appropriately    LABS:                        8.2    6.82  )-----------( 242      ( 20 Apr 2018 05:30 )             25.7     04-20    135  |  98  |  9   ----------------------------<  112<H>  3.6   |  24  |  0.98    Ca    7.8<L>      20 Apr 2018 05:30                RADIOLOGY & ADDITIONAL TESTS:

## 2018-04-20 NOTE — PROGRESS NOTE ADULT - ASSESSMENT
68 F  with DM2, HTN, HLD - gastric ulcer presented to [facility] 4/5/18 for fever and back pain, was found to have L1-L4 left sided posterior epidural abscess with discitis and osteomyelitis. Course complicated by acute GIB ( s/p transfusion 4 u prbc and 1 u plts) and finding of infrarenal IVC thrombus and b/l common illiac thrombus s/p IVC filter. Now found to have acute PE and started on heparin drip but developed melena and heparin drip dced. Currently hemodynamically stable

## 2018-04-20 NOTE — PROGRESS NOTE ADULT - ATTENDING COMMENTS
Main point of contact by phone as per daughter, Dunia ([phone]): Tori (other daughter): [phone]    daughter: Ian: [phone]  sister: Dorys: [phone]    Dispo: pending activation of insurance then patient can likely go to rehab then to complete Abx till 5/20. PT recs: rehab  Can do labs every other day now Main point of contact by phone as per daughter, Dunia ([phone]): Tori (other daughter): [phone]    daughter: Ian: [phone]  sister: Dorys: [phone]    Dispo: pending activation of insurance vs acceptance to rehab although medicaid pending and to complete Abx through 5/20. PT recs: rehab  Can do labs every other day now

## 2018-04-20 NOTE — PROVIDER CONTACT NOTE (OTHER) - ACTION/TREATMENT ORDERED:
Will order CBC and occult stool
tylenol given for back pain, zofran given IVP for nausea as ordered
No action at this time. Recheck in one hour.

## 2018-04-21 DIAGNOSIS — K25.9 GASTRIC ULCER, UNSPECIFIED AS ACUTE OR CHRONIC, WITHOUT HEMORRHAGE OR PERFORATION: ICD-10-CM

## 2018-04-21 PROCEDURE — 93010 ELECTROCARDIOGRAM REPORT: CPT

## 2018-04-21 PROCEDURE — 99233 SBSQ HOSP IP/OBS HIGH 50: CPT

## 2018-04-21 RX ORDER — METOCLOPRAMIDE HCL 10 MG
5 TABLET ORAL ONCE
Qty: 0 | Refills: 0 | Status: COMPLETED | OUTPATIENT
Start: 2018-04-21 | End: 2018-04-21

## 2018-04-21 RX ADMIN — Medication 5 MILLIGRAM(S): at 10:38

## 2018-04-21 RX ADMIN — CEFTRIAXONE 100 GRAM(S): 500 INJECTION, POWDER, FOR SOLUTION INTRAMUSCULAR; INTRAVENOUS at 12:04

## 2018-04-21 RX ADMIN — ATORVASTATIN CALCIUM 40 MILLIGRAM(S): 80 TABLET, FILM COATED ORAL at 21:39

## 2018-04-21 RX ADMIN — Medication 1 GRAM(S): at 12:04

## 2018-04-21 RX ADMIN — CHLORHEXIDINE GLUCONATE 1 APPLICATION(S): 213 SOLUTION TOPICAL at 11:43

## 2018-04-21 RX ADMIN — LIDOCAINE 1 PATCH: 4 CREAM TOPICAL at 12:04

## 2018-04-21 RX ADMIN — Medication 1 GRAM(S): at 05:29

## 2018-04-21 RX ADMIN — ONDANSETRON 4 MILLIGRAM(S): 8 TABLET, FILM COATED ORAL at 05:30

## 2018-04-21 RX ADMIN — PANTOPRAZOLE SODIUM 40 MILLIGRAM(S): 20 TABLET, DELAYED RELEASE ORAL at 07:30

## 2018-04-21 NOTE — PROGRESS NOTE ADULT - PROBLEM SELECTOR PROBLEM 4
Type 2 diabetes mellitus without complication, unspecified long term insulin use status Hypercoagulable state

## 2018-04-21 NOTE — PROGRESS NOTE ADULT - PROBLEM SELECTOR PROBLEM 5
Breast nodule Type 2 diabetes mellitus without complication, unspecified long term insulin use status

## 2018-04-21 NOTE — PROGRESS NOTE ADULT - PROBLEM SELECTOR PLAN 2
H/H stable, no A/C, PO Protonix daily, avoid NSAIDs  EGD done 4/11 with Non-bleeding gastric ulcer with a clean ulcer base and one non-bleeding duodenal ulcer with a clean ulcer  base (Faraz Class III) which was benign appearing. gastric bx negative for H. pylori and dysplasia  Repeat EGD in 4 weeks also + bacteremia, Left Epidural L1-L4 and psoas abscess with osteomyelitis and discitis. Per IR attending, abscesses not in the location or large enough to be drained  CTX for 6 weeks from the negative blood cx from 4/8-5/20/18  Weekly ESR, CRP, CBC, CMP  4/6 blood cx with aggregatibacter, DERRICK neg for endocarditis but + PE, HIV negative,  continue lidocaine patch and Tylenol PRN  - continue ceftriaxone 2g IV q24 hours

## 2018-04-21 NOTE — PROGRESS NOTE ADULT - ASSESSMENT
68 F  with DM2, HTN, HLD - gastric ulcer presented to [facility] 4/5/18 for fever and back pain, was found to have L1-L4 left sided posterior epidural abscess with discitis and osteomyelitis. Course complicated by acute GIB ( s/p transfusion 4 u prbc and 1 u plts) and finding of infrarenal IVC thrombus and b/l common illiac thrombus s/p IVC filter. Now found to have acute PE and started on heparin drip but developed melena and heparin drip dced. Currently hemodynamically stable Patient is a 69 yo woman with DM2, HTN, HLD and gastric ulcer initially admitted at [facility] on 4/5/18 for L1-L4 left sided posterior epidural abscess with discitis and osteomyelitis. Hospital course complicated by acute GIB ( s/p transfusion 4 u prbc and 1 u plts) and finding of infrarenal IVC thrombus and b/l common illiac thrombus s/p IVC filter. Patient subsequently transferred to [facility]. Patient found to have acute PE and started on heparin drip but developed melena and thus taken off AC. Currently hemodynamically stable

## 2018-04-21 NOTE — PROGRESS NOTE ADULT - PROBLEM SELECTOR PLAN 3
? underlying malignancy: recommended age appropriate cancer screening with mammogram and colonoscopy  CT A/P: thrombus in infrarenal IVC and b/l common iliac veins s/p IVC filter placed by IR on 4/10 given recent GIB but then found to have acute PE on CTA 4/12.   Now off heparin drip due to melena, H/H now stable, s/p 1 U PRBC here H/H stable, no A/C, PO Protonix daily, avoid NSAIDs  Repeat EGD in 4 weeks

## 2018-04-21 NOTE — PROGRESS NOTE ADULT - SUBJECTIVE AND OBJECTIVE BOX
Patient is a 68y old  Female who presents with a chief complaint of Lumbar epidural abscess (11 Apr 2018 18:09)        SUBJECTIVE / OVERNIGHT EVENTS:      MEDICATIONS  (STANDING):  atorvastatin 40 milliGRAM(s) Oral at bedtime  cefTRIAXone   IVPB 2 Gram(s) IV Intermittent every 24 hours  cefTRIAXone   IVPB      chlorhexidine 4% Liquid 1 Application(s) Topical <User Schedule>  dextrose 5%. 1000 milliLiter(s) (50 mL/Hr) IV Continuous <Continuous>  dextrose 50% Injectable 12.5 Gram(s) IV Push once  dextrose 50% Injectable 25 Gram(s) IV Push once  dextrose 50% Injectable 25 Gram(s) IV Push once  insulin lispro (HumaLOG) corrective regimen sliding scale   SubCutaneous three times a day before meals  insulin lispro (HumaLOG) corrective regimen sliding scale   SubCutaneous at bedtime  lidocaine   Patch 1 Patch Transdermal daily  pantoprazole    Tablet 40 milliGRAM(s) Oral before breakfast  sucralfate 1 Gram(s) Oral four times a day    MEDICATIONS  (PRN):  acetaminophen   Tablet 650 milliGRAM(s) Oral every 6 hours PRN For Temp greater than 38 C (100.4 F)  acetaminophen   Tablet. 650 milliGRAM(s) Oral every 6 hours PRN Mild Pain (1 - 3)  dextrose Gel 1 Dose(s) Oral once PRN Blood Glucose LESS THAN 70 milliGRAM(s)/deciliter  glucagon  Injectable 1 milliGRAM(s) IntraMuscular once PRN Glucose LESS THAN 70 milligrams/deciliter  ondansetron Injectable 4 milliGRAM(s) IV Push every 6 hours PRN Nausea and/or Vomiting      Vital Signs Last 24 Hrs  T(C): 36.3 (21 Apr 2018 10:10), Max: 37.2 (21 Apr 2018 05:29)  T(F): 97.4 (21 Apr 2018 10:10), Max: 98.9 (21 Apr 2018 05:29)  HR: 104 (21 Apr 2018 10:10) (82 - 104)  BP: 117/86 (21 Apr 2018 10:10) (117/86 - 137/82)  BP(mean): --  RR: 19 (21 Apr 2018 10:10) (16 - 20)  SpO2: 95% (21 Apr 2018 10:10) (95% - 100%)  CAPILLARY BLOOD GLUCOSE      POCT Blood Glucose.: 104 mg/dL (21 Apr 2018 09:13)  POCT Blood Glucose.: 121 mg/dL (20 Apr 2018 21:30)  POCT Blood Glucose.: 119 mg/dL (20 Apr 2018 17:55)  POCT Blood Glucose.: 101 mg/dL (20 Apr 2018 13:00)    I&O's Summary        PHYSICAL EXAM  GENERAL: NAD, well-developed  HEAD:  Atraumatic, Normocephalic  EYES: EOMI, PERRLA, conjunctiva and sclera clear  NECK: Supple, No JVD  CHEST/LUNG: Clear to auscultation bilaterally; No wheeze  HEART: Regular rate and rhythm; No murmurs, rubs, or gallops  ABDOMEN: Soft, Nontender, Nondistended; Bowel sounds present  EXTREMITIES:  2+ Peripheral Pulses, No clubbing, cyanosis, or edema  PSYCH: AAOx3  SKIN: No rashes or lesions    LABS:                        8.2    6.82  )-----------( 242      ( 20 Apr 2018 05:30 )             25.7     04-20    135  |  98  |  9   ----------------------------<  112<H>  3.6   |  24  |  0.98    Ca    7.8<L>      20 Apr 2018 05:30                RADIOLOGY & ADDITIONAL TESTS:    Imaging Personally Reviewed:  Consultant(s) Notes Reviewed:    Care Discussed with Consultants/Other Providers: Patient is a 68y old  Female who presents with a chief complaint of Lumbar epidural abscess (11 Apr 2018 18:09)    SUBJECTIVE / OVERNIGHT EVENTS:  Patient with intermittent nausea and dry heaves. Unclear trigger. Patient reports nausea seems worse after taking a "big pill." Patient with BM today with brown stool. No fever, chills.      MEDICATIONS  (STANDING):  atorvastatin 40 milliGRAM(s) Oral at bedtime  cefTRIAXone   IVPB 2 Gram(s) IV Intermittent every 24 hours  cefTRIAXone   IVPB      chlorhexidine 4% Liquid 1 Application(s) Topical <User Schedule>  dextrose 5%. 1000 milliLiter(s) (50 mL/Hr) IV Continuous <Continuous>  dextrose 50% Injectable 12.5 Gram(s) IV Push once  dextrose 50% Injectable 25 Gram(s) IV Push once  dextrose 50% Injectable 25 Gram(s) IV Push once  insulin lispro (HumaLOG) corrective regimen sliding scale   SubCutaneous three times a day before meals  insulin lispro (HumaLOG) corrective regimen sliding scale   SubCutaneous at bedtime  lidocaine   Patch 1 Patch Transdermal daily  pantoprazole    Tablet 40 milliGRAM(s) Oral before breakfast  sucralfate 1 Gram(s) Oral four times a day    MEDICATIONS  (PRN):  acetaminophen   Tablet 650 milliGRAM(s) Oral every 6 hours PRN For Temp greater than 38 C (100.4 F)  acetaminophen   Tablet. 650 milliGRAM(s) Oral every 6 hours PRN Mild Pain (1 - 3)  dextrose Gel 1 Dose(s) Oral once PRN Blood Glucose LESS THAN 70 milliGRAM(s)/deciliter  glucagon  Injectable 1 milliGRAM(s) IntraMuscular once PRN Glucose LESS THAN 70 milligrams/deciliter  ondansetron Injectable 4 milliGRAM(s) IV Push every 6 hours PRN Nausea and/or Vomiting      Vital Signs Last 24 Hrs  T(C): 36.3 (21 Apr 2018 10:10), Max: 37.2 (21 Apr 2018 05:29)  T(F): 97.4 (21 Apr 2018 10:10), Max: 98.9 (21 Apr 2018 05:29)  HR: 104 (21 Apr 2018 10:10) (82 - 104)  BP: 117/86 (21 Apr 2018 10:10) (117/86 - 137/82)  BP(mean): --  RR: 19 (21 Apr 2018 10:10) (16 - 20)  SpO2: 95% (21 Apr 2018 10:10) (95% - 100%)      CAPILLARY BLOOD GLUCOSE  POCT Blood Glucose.: 104 mg/dL (21 Apr 2018 09:13)  POCT Blood Glucose.: 121 mg/dL (20 Apr 2018 21:30)  POCT Blood Glucose.: 119 mg/dL (20 Apr 2018 17:55)  POCT Blood Glucose.: 101 mg/dL (20 Apr 2018 13:00)        PHYSICAL EXAM:  GENERAL: NAD, on room air  CHEST/LUNG: Clear to auscultation bilaterally; No wheezing or crackles  HEART: s1/s2, no murmurs appreciated  ABDOMEN: Soft, mild tenderness in LUQ, Nondistended; Bowel sounds present. No rebound or guarding.  EXTREMITIES:  No clubbing, cyanosis, or edema  VASCULAR: LUE PICC C/D/I  NEUROLOGY: awake, alert, responds to Qs appropriately        NO LABS          Consultant(s) Notes Reviewed:  yES  Care Discussed with Consultants/Other Providers: YES

## 2018-04-21 NOTE — PROGRESS NOTE ADULT - PROBLEM SELECTOR PLAN 5
8mm nodule in LT breast   -prior mammo 2016 neg   -recommended US/outpt mammo. monitor FS QAC/HS, FS controlled, ISS  -A1c-6.9.  Give metformin on DC

## 2018-04-21 NOTE — PROGRESS NOTE ADULT - PROBLEM SELECTOR PLAN 1
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 Per IR attending, abscesses not in the location or large enough to be drained  CTX for 6 weeks from the negative blood cx from 4/8-5/20/18  Weekly ESR, CRP, CBC, CMP  4/6 blood cx with aggregatibacter, DERRICK neg for endocarditis but + PE, HIV negative,  continue lidocaine patch and Tylenol PRN EGD done 4/11 with Non-bleeding gastric ulcer with a clean ulcer base and one non-bleeding duodenal ulcer with a clean ulcer  base (Faraz Class III) which was benign appearing. gastric bx negative for H. pylori and dysplasia  - continue pantoprazole 40mg daily  - HOLD carafate 1mg QID for now - may be contributing to nausea.  - trial of Maalox PRN

## 2018-04-21 NOTE — PROGRESS NOTE ADULT - PROBLEM SELECTOR PLAN 6
BP stable, TTE shows stage I diastolic dysfunction   -BP meds on hold. 8mm nodule in LT breast   -prior mammo 2016 neg   -recommended US/outpt mammo.

## 2018-04-21 NOTE — PROGRESS NOTE ADULT - PROBLEM SELECTOR PLAN 4
monitor FS QAC/HS, FS controlled, ISS  -A1c-6.9.  Give metformin on DC ? underlying malignancy: recommended age appropriate cancer screening with mammogram and colonoscopy  CT A/P: thrombus in infrarenal IVC and b/l common iliac veins s/p IVC filter placed by IR on 4/10 given recent GIB but then found to have acute PE on CTA 4/12.   Now off heparin drip due to melena, H/H now stable, s/p 1 U PRBC here

## 2018-04-21 NOTE — PROGRESS NOTE ADULT - PROBLEM SELECTOR PLAN 7
no A/C due to GIB, has IVC filter, on daily PPi BP stable, TTE shows stage I diastolic dysfunction   -BP meds on hold.

## 2018-04-22 VITALS — OXYGEN SATURATION: 56 %

## 2018-04-22 DIAGNOSIS — R57.0 CARDIOGENIC SHOCK: ICD-10-CM

## 2018-04-22 LAB
ANISOCYTOSIS BLD QL: SIGNIFICANT CHANGE UP
ANISOCYTOSIS BLD QL: SIGNIFICANT CHANGE UP
BASOPHILS # BLD AUTO: 0.07 K/UL — SIGNIFICANT CHANGE UP (ref 0–0.2)
BASOPHILS NFR BLD AUTO: 0.4 % — SIGNIFICANT CHANGE UP (ref 0–2)
BASOPHILS NFR SPEC: 0 % — SIGNIFICANT CHANGE UP (ref 0–2)
BASOPHILS NFR SPEC: 0 % — SIGNIFICANT CHANGE UP (ref 0–2)
BLD GP AB SCN SERPL QL: NEGATIVE — SIGNIFICANT CHANGE UP
BUN SERPL-MCNC: 9 MG/DL — SIGNIFICANT CHANGE UP (ref 7–23)
BUN SERPL-MCNC: 9 MG/DL — SIGNIFICANT CHANGE UP (ref 7–23)
CALCIUM SERPL-MCNC: 8.3 MG/DL — LOW (ref 8.4–10.5)
CALCIUM SERPL-MCNC: 8.3 MG/DL — LOW (ref 8.4–10.5)
CHLORIDE SERPL-SCNC: 97 MMOL/L — LOW (ref 98–107)
CHLORIDE SERPL-SCNC: 98 MMOL/L — SIGNIFICANT CHANGE UP (ref 98–107)
CK MB BLD-MCNC: 1.66 NG/ML — SIGNIFICANT CHANGE UP (ref 1–4.7)
CK SERPL-CCNC: 37 U/L — SIGNIFICANT CHANGE UP (ref 25–170)
CO2 SERPL-SCNC: 14 MMOL/L — LOW (ref 22–31)
CO2 SERPL-SCNC: 25 MMOL/L — SIGNIFICANT CHANGE UP (ref 22–31)
CREAT SERPL-MCNC: 1.02 MG/DL — SIGNIFICANT CHANGE UP (ref 0.5–1.3)
CREAT SERPL-MCNC: 1.2 MG/DL — SIGNIFICANT CHANGE UP (ref 0.5–1.3)
ELLIPTOCYTES BLD QL SMEAR: SLIGHT — SIGNIFICANT CHANGE UP
ELLIPTOCYTES BLD QL SMEAR: SLIGHT — SIGNIFICANT CHANGE UP
EOSINOPHIL # BLD AUTO: 0.12 K/UL — SIGNIFICANT CHANGE UP (ref 0–0.5)
EOSINOPHIL NFR BLD AUTO: 0.7 % — SIGNIFICANT CHANGE UP (ref 0–6)
EOSINOPHIL NFR FLD: 0 % — SIGNIFICANT CHANGE UP (ref 0–6)
EOSINOPHIL NFR FLD: 0 % — SIGNIFICANT CHANGE UP (ref 0–6)
GLUCOSE SERPL-MCNC: 247 MG/DL — HIGH (ref 70–99)
GLUCOSE SERPL-MCNC: 95 MG/DL — SIGNIFICANT CHANGE UP (ref 70–99)
HCT VFR BLD CALC: 27 % — LOW (ref 34.5–45)
HCT VFR BLD CALC: 27 % — LOW (ref 34.5–45)
HGB BLD-MCNC: 8.2 G/DL — LOW (ref 11.5–15.5)
HGB BLD-MCNC: 8.2 G/DL — LOW (ref 11.5–15.5)
IMM GRANULOCYTES # BLD AUTO: 0.14 # — SIGNIFICANT CHANGE UP
IMM GRANULOCYTES NFR BLD AUTO: 0.9 % — SIGNIFICANT CHANGE UP (ref 0–1.5)
INR BLD: 1.16 — SIGNIFICANT CHANGE UP (ref 0.88–1.17)
LYMPHOCYTES # BLD AUTO: 11.28 K/UL — HIGH (ref 1–3.3)
LYMPHOCYTES # BLD AUTO: 69.3 % — HIGH (ref 13–44)
LYMPHOCYTES NFR SPEC AUTO: 62 % — HIGH (ref 13–44)
LYMPHOCYTES NFR SPEC AUTO: 62 % — HIGH (ref 13–44)
MAGNESIUM SERPL-MCNC: 1.8 MG/DL — SIGNIFICANT CHANGE UP (ref 1.6–2.6)
MANUAL SMEAR VERIFICATION: SIGNIFICANT CHANGE UP
MANUAL SMEAR VERIFICATION: SIGNIFICANT CHANGE UP
MCHC RBC-ENTMCNC: 29.1 PG — SIGNIFICANT CHANGE UP (ref 27–34)
MCHC RBC-ENTMCNC: 29.1 PG — SIGNIFICANT CHANGE UP (ref 27–34)
MCHC RBC-ENTMCNC: 30.4 % — LOW (ref 32–36)
MCHC RBC-ENTMCNC: 30.4 % — LOW (ref 32–36)
MCV RBC AUTO: 95.7 FL — SIGNIFICANT CHANGE UP (ref 80–100)
MCV RBC AUTO: 95.7 FL — SIGNIFICANT CHANGE UP (ref 80–100)
MICROCYTES BLD QL: SLIGHT — SIGNIFICANT CHANGE UP
MICROCYTES BLD QL: SLIGHT — SIGNIFICANT CHANGE UP
MONOCYTES # BLD AUTO: 0.83 K/UL — SIGNIFICANT CHANGE UP (ref 0–0.9)
MONOCYTES NFR BLD AUTO: 5.1 % — SIGNIFICANT CHANGE UP (ref 2–14)
MONOCYTES NFR BLD: 11 % — HIGH (ref 2–9)
MONOCYTES NFR BLD: 11 % — HIGH (ref 2–9)
NEUTROPHIL AB SER-ACNC: 26 % — LOW (ref 43–77)
NEUTROPHIL AB SER-ACNC: 26 % — LOW (ref 43–77)
NEUTROPHILS # BLD AUTO: 3.84 K/UL — SIGNIFICANT CHANGE UP (ref 1.8–7.4)
NEUTROPHILS NFR BLD AUTO: 23.6 % — LOW (ref 43–77)
NEUTS BAND # BLD: 1 % — SIGNIFICANT CHANGE UP (ref 0–6)
NEUTS BAND # BLD: 1 % — SIGNIFICANT CHANGE UP (ref 0–6)
NRBC # BLD: 1 /100WBC — SIGNIFICANT CHANGE UP
NRBC # BLD: 1 /100WBC — SIGNIFICANT CHANGE UP
NRBC # FLD: 0 — SIGNIFICANT CHANGE UP
NRBC # FLD: 0 — SIGNIFICANT CHANGE UP
PHOSPHATE SERPL-MCNC: 4.1 MG/DL — SIGNIFICANT CHANGE UP (ref 2.5–4.5)
PLATELET # BLD AUTO: 246 K/UL — SIGNIFICANT CHANGE UP (ref 150–400)
PLATELET # BLD AUTO: 246 K/UL — SIGNIFICANT CHANGE UP (ref 150–400)
PLATELET COUNT - ESTIMATE: NORMAL — SIGNIFICANT CHANGE UP
PLATELET COUNT - ESTIMATE: NORMAL — SIGNIFICANT CHANGE UP
PMV BLD: 9.4 FL — SIGNIFICANT CHANGE UP (ref 7–13)
PMV BLD: 9.4 FL — SIGNIFICANT CHANGE UP (ref 7–13)
POLYCHROMASIA BLD QL SMEAR: SLIGHT — SIGNIFICANT CHANGE UP
POLYCHROMASIA BLD QL SMEAR: SLIGHT — SIGNIFICANT CHANGE UP
POTASSIUM SERPL-MCNC: 3.7 MMOL/L — SIGNIFICANT CHANGE UP (ref 3.5–5.3)
POTASSIUM SERPL-MCNC: 4.1 MMOL/L — SIGNIFICANT CHANGE UP (ref 3.5–5.3)
POTASSIUM SERPL-SCNC: 3.7 MMOL/L — SIGNIFICANT CHANGE UP (ref 3.5–5.3)
POTASSIUM SERPL-SCNC: 4.1 MMOL/L — SIGNIFICANT CHANGE UP (ref 3.5–5.3)
PROTHROM AB SERPL-ACNC: 12.9 SEC — SIGNIFICANT CHANGE UP (ref 9.8–13.1)
RBC # BLD: 2.82 M/UL — LOW (ref 3.8–5.2)
RBC # BLD: 2.82 M/UL — LOW (ref 3.8–5.2)
RBC # FLD: 15.4 % — HIGH (ref 10.3–14.5)
RBC # FLD: 15.4 % — HIGH (ref 10.3–14.5)
REVIEW TO FOLLOW: YES — SIGNIFICANT CHANGE UP
REVIEW TO FOLLOW: YES — SIGNIFICANT CHANGE UP
RH IG SCN BLD-IMP: POSITIVE — SIGNIFICANT CHANGE UP
SMUDGE CELLS # BLD: PRESENT — SIGNIFICANT CHANGE UP
SMUDGE CELLS # BLD: PRESENT — SIGNIFICANT CHANGE UP
SODIUM SERPL-SCNC: 135 MMOL/L — SIGNIFICANT CHANGE UP (ref 135–145)
SODIUM SERPL-SCNC: 136 MMOL/L — SIGNIFICANT CHANGE UP (ref 135–145)
TROPONIN T SERPL-MCNC: < 0.06 NG/ML — SIGNIFICANT CHANGE UP (ref 0–0.06)
WBC # BLD: 16.28 K/UL — HIGH (ref 3.8–10.5)
WBC # BLD: 16.28 K/UL — HIGH (ref 3.8–10.5)
WBC # FLD AUTO: 16.28 K/UL — HIGH (ref 3.8–10.5)
WBC # FLD AUTO: 16.28 K/UL — HIGH (ref 3.8–10.5)

## 2018-04-22 PROCEDURE — 93010 ELECTROCARDIOGRAM REPORT: CPT

## 2018-04-22 PROCEDURE — 99233 SBSQ HOSP IP/OBS HIGH 50: CPT

## 2018-04-22 PROCEDURE — 99291 CRITICAL CARE FIRST HOUR: CPT

## 2018-04-22 RX ORDER — PIPERACILLIN AND TAZOBACTAM 4; .5 G/20ML; G/20ML
3.38 INJECTION, POWDER, LYOPHILIZED, FOR SOLUTION INTRAVENOUS EVERY 8 HOURS
Qty: 0 | Refills: 0 | Status: DISCONTINUED | OUTPATIENT
Start: 2018-04-22 | End: 2018-04-22

## 2018-04-22 RX ORDER — SODIUM CHLORIDE 9 MG/ML
1000 INJECTION INTRAMUSCULAR; INTRAVENOUS; SUBCUTANEOUS
Qty: 0 | Refills: 0 | Status: DISCONTINUED | OUTPATIENT
Start: 2018-04-22 | End: 2018-04-22

## 2018-04-22 RX ORDER — HEPARIN SODIUM 5000 [USP'U]/ML
5000 INJECTION INTRAVENOUS; SUBCUTANEOUS ONCE
Qty: 0 | Refills: 0 | Status: DISCONTINUED | OUTPATIENT
Start: 2018-04-22 | End: 2018-04-22

## 2018-04-22 RX ORDER — PANTOPRAZOLE SODIUM 20 MG/1
40 TABLET, DELAYED RELEASE ORAL
Qty: 0 | Refills: 0 | Status: DISCONTINUED | OUTPATIENT
Start: 2018-04-22 | End: 2018-04-22

## 2018-04-22 RX ORDER — HEPARIN SODIUM 5000 [USP'U]/ML
INJECTION INTRAVENOUS; SUBCUTANEOUS
Qty: 25000 | Refills: 0 | Status: DISCONTINUED | OUTPATIENT
Start: 2018-04-22 | End: 2018-04-22

## 2018-04-22 RX ORDER — VANCOMYCIN HCL 1 G
1000 VIAL (EA) INTRAVENOUS EVERY 12 HOURS
Qty: 0 | Refills: 0 | Status: DISCONTINUED | OUTPATIENT
Start: 2018-04-22 | End: 2018-04-22

## 2018-04-22 RX ORDER — HEPARIN SODIUM 5000 [USP'U]/ML
2500 INJECTION INTRAVENOUS; SUBCUTANEOUS EVERY 6 HOURS
Qty: 0 | Refills: 0 | Status: DISCONTINUED | OUTPATIENT
Start: 2018-04-22 | End: 2018-04-22

## 2018-04-22 RX ORDER — SODIUM CHLORIDE 9 MG/ML
1000 INJECTION INTRAMUSCULAR; INTRAVENOUS; SUBCUTANEOUS ONCE
Qty: 0 | Refills: 0 | Status: DISCONTINUED | OUTPATIENT
Start: 2018-04-22 | End: 2018-04-22

## 2018-04-22 RX ORDER — ALTEPLASE 100 MG
50 KIT INTRAVENOUS ONCE
Qty: 0 | Refills: 0 | Status: DISCONTINUED | OUTPATIENT
Start: 2018-04-22 | End: 2018-04-22

## 2018-04-22 RX ORDER — HEPARIN SODIUM 5000 [USP'U]/ML
5000 INJECTION INTRAVENOUS; SUBCUTANEOUS EVERY 6 HOURS
Qty: 0 | Refills: 0 | Status: DISCONTINUED | OUTPATIENT
Start: 2018-04-22 | End: 2018-04-22

## 2018-04-22 RX ORDER — ALTEPLASE 100 MG
6 KIT INTRAVENOUS ONCE
Qty: 0 | Refills: 0 | Status: DISCONTINUED | OUTPATIENT
Start: 2018-04-22 | End: 2018-04-22

## 2018-04-22 RX ORDER — ALTEPLASE 100 MG
52 KIT INTRAVENOUS ONCE
Qty: 0 | Refills: 0 | Status: DISCONTINUED | OUTPATIENT
Start: 2018-04-22 | End: 2018-04-22

## 2018-04-22 RX ADMIN — PANTOPRAZOLE SODIUM 40 MILLIGRAM(S): 20 TABLET, DELAYED RELEASE ORAL at 06:52

## 2018-04-22 RX ADMIN — LIDOCAINE 1 PATCH: 4 CREAM TOPICAL at 00:25

## 2018-04-22 NOTE — PROGRESS NOTE ADULT - PROVIDER SPECIALTY LIST ADULT
Gastroenterology
Gastroenterology
Hospitalist
Infectious Disease
Neurosurgery
Neurosurgery
Surgery
Infectious Disease
Hospitalist

## 2018-04-22 NOTE — PROGRESS NOTE ADULT - SUBJECTIVE AND OBJECTIVE BOX
Patient is a 68y old  Female who presents with a chief complaint of Lumbar epidural abscess (11 Apr 2018 18:09)        SUBJECTIVE / OVERNIGHT EVENTS:      MEDICATIONS  (STANDING):  atorvastatin 40 milliGRAM(s) Oral at bedtime  cefTRIAXone   IVPB 2 Gram(s) IV Intermittent every 24 hours  cefTRIAXone   IVPB      chlorhexidine 4% Liquid 1 Application(s) Topical <User Schedule>  dextrose 5%. 1000 milliLiter(s) (50 mL/Hr) IV Continuous <Continuous>  dextrose 50% Injectable 12.5 Gram(s) IV Push once  dextrose 50% Injectable 25 Gram(s) IV Push once  dextrose 50% Injectable 25 Gram(s) IV Push once  insulin lispro (HumaLOG) corrective regimen sliding scale   SubCutaneous three times a day before meals  insulin lispro (HumaLOG) corrective regimen sliding scale   SubCutaneous at bedtime  lidocaine   Patch 1 Patch Transdermal daily  pantoprazole    Tablet 40 milliGRAM(s) Oral before breakfast  piperacillin/tazobactam IVPB. 3.375 Gram(s) IV Intermittent every 8 hours  sodium chloride 0.9% Bolus 1000 milliLiter(s) IV Bolus once  vancomycin  IVPB 1000 milliGRAM(s) IV Intermittent every 12 hours    MEDICATIONS  (PRN):  acetaminophen   Tablet 650 milliGRAM(s) Oral every 6 hours PRN For Temp greater than 38 C (100.4 F)  acetaminophen   Tablet. 650 milliGRAM(s) Oral every 6 hours PRN Mild Pain (1 - 3)  dextrose Gel 1 Dose(s) Oral once PRN Blood Glucose LESS THAN 70 milliGRAM(s)/deciliter  glucagon  Injectable 1 milliGRAM(s) IntraMuscular once PRN Glucose LESS THAN 70 milligrams/deciliter  ondansetron Injectable 4 milliGRAM(s) IV Push every 6 hours PRN Nausea and/or Vomiting      Vital Signs Last 24 Hrs  T(C): 36.8 (22 Apr 2018 05:26), Max: 37.4 (21 Apr 2018 20:56)  T(F): 98.3 (22 Apr 2018 05:26), Max: 99.4 (21 Apr 2018 20:56)  HR: 81 (22 Apr 2018 05:26) (81 - 105)  BP: 118/84 (22 Apr 2018 05:26) (115/79 - 132/87)  BP(mean): --  RR: 18 (22 Apr 2018 05:26) (17 - 19)  SpO2: 96% (22 Apr 2018 05:26) (92% - 96%)  CAPILLARY BLOOD GLUCOSE      POCT Blood Glucose.: 120 mg/dL (22 Apr 2018 09:18)  POCT Blood Glucose.: 110 mg/dL (22 Apr 2018 09:03)  POCT Blood Glucose.: 99 mg/dL (21 Apr 2018 21:43)  POCT Blood Glucose.: 144 mg/dL (21 Apr 2018 18:12)  POCT Blood Glucose.: 125 mg/dL (21 Apr 2018 12:58)    I&O's Summary        PHYSICAL EXAM  GENERAL: NAD, well-developed  HEAD:  Atraumatic, Normocephalic  EYES: EOMI, PERRLA, conjunctiva and sclera clear  NECK: Supple, No JVD  CHEST/LUNG: Clear to auscultation bilaterally; No wheeze  HEART: Regular rate and rhythm; No murmurs, rubs, or gallops  ABDOMEN: Soft, Nontender, Nondistended; Bowel sounds present  EXTREMITIES:  2+ Peripheral Pulses, No clubbing, cyanosis, or edema  PSYCH: AAOx3  SKIN: No rashes or lesions    LABS:    04-22    136  |  98  |  9   ----------------------------<  95  3.7   |  25  |  1.02    Ca    8.3<L>      22 Apr 2018 07:00  Phos  4.1     04-22  Mg     1.8     04-22                RADIOLOGY & ADDITIONAL TESTS:    Imaging Personally Reviewed:  Consultant(s) Notes Reviewed:    Care Discussed with Consultants/Other Providers: Patient is a 68y old  Female who presents with a chief complaint of Lumbar epidural abscess (11 Apr 2018 18:09)    SUBJECTIVE / OVERNIGHT EVENTS:  Rapid response called this morning ~9:20am after pt became weak, pale, and diaphoretic while ambulating with daughter. Patient was noted to be hypotensive SBP 70s. Patient was started on IV normal saline bolus. Patient never lost consciousness, but pt complained of feeling dizzy and nauseated.  Upon my arrival, RRT, critical care team and nursing staff was present at bedside. Family also present a    MEDICATIONS  (STANDING):  atorvastatin 40 milliGRAM(s) Oral at bedtime  cefTRIAXone   IVPB 2 Gram(s) IV Intermittent every 24 hours  cefTRIAXone   IVPB      chlorhexidine 4% Liquid 1 Application(s) Topical <User Schedule>  dextrose 5%. 1000 milliLiter(s) (50 mL/Hr) IV Continuous <Continuous>  dextrose 50% Injectable 12.5 Gram(s) IV Push once  dextrose 50% Injectable 25 Gram(s) IV Push once  dextrose 50% Injectable 25 Gram(s) IV Push once  insulin lispro (HumaLOG) corrective regimen sliding scale   SubCutaneous three times a day before meals  insulin lispro (HumaLOG) corrective regimen sliding scale   SubCutaneous at bedtime  lidocaine   Patch 1 Patch Transdermal daily  pantoprazole    Tablet 40 milliGRAM(s) Oral before breakfast  piperacillin/tazobactam IVPB. 3.375 Gram(s) IV Intermittent every 8 hours  sodium chloride 0.9% Bolus 1000 milliLiter(s) IV Bolus once  vancomycin  IVPB 1000 milliGRAM(s) IV Intermittent every 12 hours    MEDICATIONS  (PRN):  acetaminophen   Tablet 650 milliGRAM(s) Oral every 6 hours PRN For Temp greater than 38 C (100.4 F)  acetaminophen   Tablet. 650 milliGRAM(s) Oral every 6 hours PRN Mild Pain (1 - 3)  dextrose Gel 1 Dose(s) Oral once PRN Blood Glucose LESS THAN 70 milliGRAM(s)/deciliter  glucagon  Injectable 1 milliGRAM(s) IntraMuscular once PRN Glucose LESS THAN 70 milligrams/deciliter  ondansetron Injectable 4 milliGRAM(s) IV Push every 6 hours PRN Nausea and/or Vomiting      Vital Signs Last 24 Hrs  T(C): 36.8 (22 Apr 2018 05:26), Max: 37.4 (21 Apr 2018 20:56)  T(F): 98.3 (22 Apr 2018 05:26), Max: 99.4 (21 Apr 2018 20:56)  HR: 81 (22 Apr 2018 05:26) (81 - 105)  BP: 118/84 (22 Apr 2018 05:26) (115/79 - 132/87)  BP(mean): --  RR: 18 (22 Apr 2018 05:26) (17 - 19)  SpO2: 96% (22 Apr 2018 05:26) (92% - 96%)      CAPILLARY BLOOD GLUCOSE  POCT Blood Glucose.: 120 mg/dL (22 Apr 2018 09:18)  POCT Blood Glucose.: 110 mg/dL (22 Apr 2018 09:03)  POCT Blood Glucose.: 99 mg/dL (21 Apr 2018 21:43)  POCT Blood Glucose.: 144 mg/dL (21 Apr 2018 18:12)  POCT Blood Glucose.: 125 mg/dL (21 Apr 2018 12:58)          PHYSICAL EXAM  GENERAL: NAD, well-developed  HEAD:  Atraumatic, Normocephalic  EYES: EOMI, PERRLA, conjunctiva and sclera clear  NECK: Supple, No JVD  CHEST/LUNG: Clear to auscultation bilaterally; No wheeze  HEART: Regular rate and rhythm; No murmurs, rubs, or gallops  ABDOMEN: Soft, Nontender, Nondistended; Bowel sounds present  EXTREMITIES:  2+ Peripheral Pulses, No clubbing, cyanosis, or edema  PSYCH: AAOx3  SKIN: No rashes or lesions      LABS:    04-22    136  |  98  |  9   ----------------------------<  95  3.7   |  25  |  1.02    Ca    8.3<L>      22 Apr 2018 07:00  Phos  4.1     04-22  Mg     1.8     04-22        RADIOLOGY & ADDITIONAL TESTS:    04/12/2018: Transesophageal echocardiogram ([facility])  ------------------------------------------------------------------------  OBSERVATIONS:  Mitral Valve: Mitral annular calcification, otherwise  normal mitral valve.  No vegetations seen in association  with the mitral valve. Minimal mitral regurgitation.  Aortic Root: Normal aortic root.  Mild non-mobile  atherosclerotic plaque in the aortic arch and descending  thoracic aorta.  Aortic Valve: Calcified trileaflet aortic valve with normal  opening.  No vegetations seen in association with the  aortic valve. No aortic valve regurgitation seen.  Left Atrium: Normal left atrium.  No left atrium or left  atrial appendage thrombus.  Left Ventricle: Normal left ventricular systolic function.  No segmental wall motion abnormalities.  Right Heart: Normal right atrium. Normal right ventricular  size and function. Normal tricuspid valve.  No vegetations  seen in association with the tricuspid valve.  Minimal  tricuspid regurgitation. Normal pulmonic valve.  No  vegetation seen in association with the pulmonic valve.  Pericardium/PleuraNormal pericardium with no pericardial  effusion.  ------------------------------------------------------------------------  CONCLUSIONS:  1. Mitral annular calcification, otherwise normal mitral  valve.  No vegetations seen in association with the mitral  valve. Minimal mitral regurgitation.  2. Calcified trileaflet aortic valve with normal opening.  No vegetations seen in association with the aortic valve.  No aortic valve regurgitation seen.  3. Normal aortic root.  Mild non-mobile atherosclerotic  plaque in the aortic arch and descending thoracic aorta.  4. Normal left atrium.  No left atrium or left atrial  appendage thrombus.  5. Normal left ventricular systolic function. No segmental  wall motion abnormalities.  6. Normal right ventricular size and function.  7. Contrast injection demonstrates no evidence of a patent  foramen ovale.  8. A large (about  1.5 cm X 0.4 cm, but this is likely an  underestimate), mass is visualized in what appears to be a  branch of the right main pulmonary artery.  A highly mobile  extension of this mass protrudes into the right main  pulmonary artery.  Although the nature of this mass is  uncertain, it may represent thrombus or tumour.  No valvular vegetations were identified.  However, a mobile  mass was seen in the right main pulmonary artery (see no. 8  above).        Consultant(s) Notes Reviewed:  yes  Care Discussed with Consultants/Other Providers: Yes Patient is a 68y old  Female who presents with a chief complaint of Lumbar epidural abscess (11 Apr 2018 18:09)    SUBJECTIVE / OVERNIGHT EVENTS:  Rapid response called this morning ~9:20am after pt became weak, pale, and diaphoretic while ambulating with daughter. Patient was noted to be hypotensive SBP 70s. Patient was started on IV normal saline bolus. Patient never lost consciousness, but pt complained of feeling dizzy and nauseated.  Upon my arrival, RRT, critical care team and nursing staff was present at bedside. Family also present at bedside.  EKG done and reviewed - sinus tach , ST depressions in I, aVL  Bedside US - findings suggestive or RA and LA thrombi    MEDICATIONS  (STANDING):  atorvastatin 40 milliGRAM(s) Oral at bedtime  cefTRIAXone   IVPB 2 Gram(s) IV Intermittent every 24 hours  cefTRIAXone   IVPB      chlorhexidine 4% Liquid 1 Application(s) Topical <User Schedule>  dextrose 5%. 1000 milliLiter(s) (50 mL/Hr) IV Continuous <Continuous>  dextrose 50% Injectable 12.5 Gram(s) IV Push once  dextrose 50% Injectable 25 Gram(s) IV Push once  dextrose 50% Injectable 25 Gram(s) IV Push once  insulin lispro (HumaLOG) corrective regimen sliding scale   SubCutaneous three times a day before meals  insulin lispro (HumaLOG) corrective regimen sliding scale   SubCutaneous at bedtime  lidocaine   Patch 1 Patch Transdermal daily  pantoprazole    Tablet 40 milliGRAM(s) Oral before breakfast  piperacillin/tazobactam IVPB. 3.375 Gram(s) IV Intermittent every 8 hours  sodium chloride 0.9% Bolus 1000 milliLiter(s) IV Bolus once  vancomycin  IVPB 1000 milliGRAM(s) IV Intermittent every 12 hours    MEDICATIONS  (PRN):  acetaminophen   Tablet 650 milliGRAM(s) Oral every 6 hours PRN For Temp greater than 38 C (100.4 F)  acetaminophen   Tablet. 650 milliGRAM(s) Oral every 6 hours PRN Mild Pain (1 - 3)  dextrose Gel 1 Dose(s) Oral once PRN Blood Glucose LESS THAN 70 milliGRAM(s)/deciliter  glucagon  Injectable 1 milliGRAM(s) IntraMuscular once PRN Glucose LESS THAN 70 milligrams/deciliter  ondansetron Injectable 4 milliGRAM(s) IV Push every 6 hours PRN Nausea and/or Vomiting      Vital Signs Last 24 Hrs  T(C): 36.8 (22 Apr 2018 05:26), Max: 37.4 (21 Apr 2018 20:56)  T(F): 98.3 (22 Apr 2018 05:26), Max: 99.4 (21 Apr 2018 20:56)  HR: 81 (22 Apr 2018 05:26) (81 - 105)  BP: 118/84 (22 Apr 2018 05:26) (115/79 - 132/87)  BP(mean): --  RR: 18 (22 Apr 2018 05:26) (17 - 19)  SpO2: 96% (22 Apr 2018 05:26) (92% - 96%)      CAPILLARY BLOOD GLUCOSE  POCT Blood Glucose.: 120 mg/dL (22 Apr 2018 09:18)  POCT Blood Glucose.: 110 mg/dL (22 Apr 2018 09:03)  POCT Blood Glucose.: 99 mg/dL (21 Apr 2018 21:43)  POCT Blood Glucose.: 144 mg/dL (21 Apr 2018 18:12)  POCT Blood Glucose.: 125 mg/dL (21 Apr 2018 12:58)        PHYSICAL EXAM:  GENERAL: acutely ill, pale, dry heaving, on O2 via NC  CHEST/LUNG: Clear to auscultation bilaterally;  HEART: s1/s2, no murmurs appreciated  ABDOMEN: Soft, nondistended  EXTREMITIES:  cool extremities  VASCULAR: LUE PICC C/D/I  NEUROLOGY: lethargic but responding to questions      LABS:    04-22    136  |  98  |  9   ----------------------------<  95  3.7   |  25  |  1.02    Ca    8.3<L>      22 Apr 2018 07:00  Phos  4.1     04-22  Mg     1.8     04-22        RADIOLOGY & ADDITIONAL TESTS:    04/12/2018: Transesophageal echocardiogram ([facility])  ------------------------------------------------------------------------  OBSERVATIONS:  Mitral Valve: Mitral annular calcification, otherwise  normal mitral valve.  No vegetations seen in association  with the mitral valve. Minimal mitral regurgitation.  Aortic Root: Normal aortic root.  Mild non-mobile  atherosclerotic plaque in the aortic arch and descending  thoracic aorta.  Aortic Valve: Calcified trileaflet aortic valve with normal  opening.  No vegetations seen in association with the  aortic valve. No aortic valve regurgitation seen.  Left Atrium: Normal left atrium.  No left atrium or left  atrial appendage thrombus.  Left Ventricle: Normal left ventricular systolic function.  No segmental wall motion abnormalities.  Right Heart: Normal right atrium. Normal right ventricular  size and function. Normal tricuspid valve.  No vegetations  seen in association with the tricuspid valve.  Minimal  tricuspid regurgitation. Normal pulmonic valve.  No  vegetation seen in association with the pulmonic valve.  Pericardium/PleuraNormal pericardium with no pericardial  effusion.  ------------------------------------------------------------------------  CONCLUSIONS:  1. Mitral annular calcification, otherwise normal mitral  valve.  No vegetations seen in association with the mitral  valve. Minimal mitral regurgitation.  2. Calcified trileaflet aortic valve with normal opening.  No vegetations seen in association with the aortic valve.  No aortic valve regurgitation seen.  3. Normal aortic root.  Mild non-mobile atherosclerotic  plaque in the aortic arch and descending thoracic aorta.  4. Normal left atrium.  No left atrium or left atrial  appendage thrombus.  5. Normal left ventricular systolic function. No segmental  wall motion abnormalities.  6. Normal right ventricular size and function.  7. Contrast injection demonstrates no evidence of a patent  foramen ovale.  8. A large (about  1.5 cm X 0.4 cm, but this is likely an  underestimate), mass is visualized in what appears to be a  branch of the right main pulmonary artery.  A highly mobile  extension of this mass protrudes into the right main  pulmonary artery.  Although the nature of this mass is  uncertain, it may represent thrombus or tumour.  No valvular vegetations were identified.  However, a mobile  mass was seen in the right main pulmonary artery (see no. 8  above).        Consultant(s) Notes Reviewed:  yes  Care Discussed with Consultants/Other Providers: Yes

## 2018-04-22 NOTE — CHART NOTE - NSCHARTNOTEFT_GEN_A_CORE
MICU Acceptance Note:  Patient arrived in the MICU. Upon arrival, patient became unresponsive for several minutese and a pulse was lost for several seconds, but she came back on her own. While she was being attached to monitors, she again became unresponsive, her eyes rolled back, and her heart rate slowed. She was given atropine x 1, and chest compressions were started when pulse went below 20. She was bagged. Her rhythm upon rhythm check was asystole. She was given multiple rounds of epinephrine with continuous CPR and bagging. Patient never achieved ROSC. Patient had no spontaneous respirations, heart sounds, response to any stimulus including noxious stimuli. Patient's pupil were fixed and dilated at 8mm and with no response to light, no corneal reflexes, no gag reflex, and no oculocephalic reflex. Patient was pronounced at 11:18am. Patient's family was in the waiting room and were informed. MICU Acceptance Note:    History:  69 YO female was admitted to [facility] 4/5/18 with 2 weeks of back pain and fevers. Patient was unable to ambulate due to pain but had no focal weakness, no sensory loss, no bowel or bladder dysfunction. MRI of the lumbar spine revealed a left sided posterior epidural abscess extending from L1-L4. Her hospital course was complicated by an upper GI bleed requiring transfusions of 4U PRBC and 1U of platelets. Patient was evaluated by neurosurgery with no plan for neurosurgical intervention.  Patient was found to have bilateral DVTs, and had an IVC filter placed as she had bled previously. EGD was done, concerning for gastric mass. Patient underwent CTA, which demonstrated an acute PE within the posterior segmental basal artery of the left lower lobe. Patient was started on heparin.  Abdominal CT showed also psoas abscess with extensive DVT in b/l iliac and infrarenal IVC. Patient was also found to have Aggrigatebacter bacteremia. Was getting imepenem. Patient received a PICC line for long term antibiotics and was stable and getting ready to be transferred to rehab. On 4/22/18, a RRT called for syncope.  Per RN, patient was ambulating back from the bathroom with her daughter when she started to feel weak and collapsed to the ground, never lost consciousness.  She was helped to the bed and placed on monitor, showing tachycardic to 130s.  Rectal temp 99.2.  Initial BP was 70s systolic and 1L NS bolus under pressure back was started.  She was cold and diaphoretic.  Mental status always intact, patient followed commands and answered appropriately to questions, only complaint of back pain.  Patient's BP and SpO2 fluctuated ranging from 160s systolic to 50s systolic.  Her pulse on palpation varied from faint to strong and regular.  EKG was done which showed ST depressions in I, avL and V5/V6.   Given history of clots (PE, IVC and iliac veins of unknown etiology), strong suspicion for new clot burden.  Patient had IVC filter placed ~2 weeks ago because she failed anticoagulation due to GI bleed.    MICU consult was called for called for hypotension with history of multiple clots. Upon evaluation, point of care ultrasound of the heart confirmed clots in transit in the RA and RV, unclear whether in LA as well. Patient was transported immediately to the MICU.     PAST MEDICAL & SURGICAL HISTORY:  Acute gastric ulcer with hemorrhage (K25.0)  Essential hypertension (I10)  Type 2 diabetes mellitus without complication (E11.9)  HLD (hyperlipidemia) (E78.5)  H/O tubal ligation (Z98.51)    MEDICATIONS  (STANDING):  alteplase    IVPB 50 milliGRAM(s) IV Intermittent once  atorvastatin 40 milliGRAM(s) Oral at bedtime  cefTRIAXone   IVPB 2 Gram(s) IV Intermittent every 24 hours  cefTRIAXone   IVPB      chlorhexidine 4% Liquid 1 Application(s) Topical <User Schedule>  dextrose 5%. 1000 milliLiter(s) (50 mL/Hr) IV Continuous <Continuous>  dextrose 50% Injectable 12.5 Gram(s) IV Push once  dextrose 50% Injectable 25 Gram(s) IV Push once  dextrose 50% Injectable 25 Gram(s) IV Push once  heparin  Infusion.  Unit(s)/Hr (11 mL/Hr) IV Continuous <Continuous>  heparin  Injectable 5000 Unit(s) IV Push once  insulin lispro (HumaLOG) corrective regimen sliding scale   SubCutaneous three times a day before meals  insulin lispro (HumaLOG) corrective regimen sliding scale   SubCutaneous at bedtime  lidocaine   Patch 1 Patch Transdermal daily  pantoprazole  Injectable 40 milliGRAM(s) IV Push two times a day  sodium chloride 0.9%. 1000 milliLiter(s) (100 mL/Hr) IV Continuous <Continuous>    MEDICATIONS  (PRN):  acetaminophen   Tablet 650 milliGRAM(s) Oral every 6 hours PRN For Temp greater than 38 C (100.4 F)  acetaminophen   Tablet. 650 milliGRAM(s) Oral every 6 hours PRN Mild Pain (1 - 3)  dextrose Gel 1 Dose(s) Oral once PRN Blood Glucose LESS THAN 70 milliGRAM(s)/deciliter  glucagon  Injectable 1 milliGRAM(s) IntraMuscular once PRN Glucose LESS THAN 70 milligrams/deciliter  heparin  Injectable 5000 Unit(s) IV Push every 6 hours PRN For aPTT less than 40  heparin  Injectable 2500 Unit(s) IV Push every 6 hours PRN For aPTT between 40 - 57  ondansetron Injectable 4 milliGRAM(s) IV Push every 6 hours PRN Nausea and/or Vomiting    GENERAL: patient appears pale, anxious and uncomfortable  HEAD:  Atraumatic, Normocephalic  EYES: EOMI, PERRLA, conjunctiva and sclera clear  NECK: Supple, No JVD  CHEST/LUNG: tachypneic, increased work of breathing  HEART: Tachycardic; No murmurs, rubs, or gallops  ABDOMEN: Soft, Nontender, mildly distended  EXTREMITIES:  2+ Peripheral Pulses, No clubbing, cyanosis, or edema  PSYCH: AAOx3, however very anxious  NEUROLOGY: grossly intact, moving all extremities.    Assessment:  68 year old female with PMH multiple clots, GIB, HTN, T2DM transferred to the MICU for instability in the setting of current clot in the RA and RV, concerning for massive pulmonary embolism.    Neuro:  -Patient is confused, minimally responsive metabolic encephalopathy. Could be in the setting of massive PE vs stroke.   -Patient currently unstable to go to [facility]. Will obtain when patient stabilizes.    CV:  -Patient is tachycardic and hypotensive. Will avoid fluids as pt likely in obstructive shock.   -Would add pressors if MAP>65.     Pulmonary:  -Concerning for massive PE.   -Need to discuss with family the risks and benefits of tPA given patient's recent GIB s/p heparin.  -Will put patient on heparin currently as it is emergently indicated.    Renal:  -No issues currently    GI:  -Concerning for GIB if we were to give tPA. Need to discuss with family and NOK as pt currently encephalopathic.    Metabolic:  -Bicarb 14. Patient tachypneic; likely in respiratory acidosis with PE as the main concern.    Hematology:  -H/H stable at 8.   -Will trend    DVT prophylaxis:  -Pt with known DVTs, IVC clot, and intracardiac thrombi. S/p IVC filter. Concerning for PE. Start on heparin drip. Consider tpa.   -----    Malissa THOMAS PGY1  [id number] MICU Acceptance Note:    History:  69 YO female was admitted to [facility] 4/5/18 with 2 weeks of back pain and fevers. Patient was unable to ambulate due to pain but had no focal weakness, no sensory loss, no bowel or bladder dysfunction. MRI of the lumbar spine revealed a left sided posterior epidural abscess extending from L1-L4. Her hospital course was complicated by an upper GI bleed requiring transfusions of 4U PRBC and 1U of platelets. Patient was evaluated by neurosurgery with no plan for neurosurgical intervention.  Patient was found to have bilateral DVTs, and had an IVC filter placed as she had bled previously. EGD was done, concerning for gastric mass. Patient underwent CTA, which demonstrated an acute PE within the posterior segmental basal artery of the left lower lobe. Patient was started on heparin.  Abdominal CT showed also psoas abscess with extensive DVT in b/l iliac and infrarenal IVC. Patient was also found to have Aggrigatebacter bacteremia. Was getting imepenem. Patient received a PICC line for long term antibiotics and was stable and getting ready to be transferred to rehab. On 4/22/18, a RRT called for syncope.  Per RN, patient was ambulating back from the bathroom with her daughter when she started to feel weak and collapsed to the ground, never lost consciousness.  She was helped to the bed and placed on monitor, showing tachycardic to 130s.  Rectal temp 99.2.  Initial BP was 70s systolic and 1L NS bolus under pressure back was started.  She was cold and diaphoretic.  Mental status always intact, patient followed commands and answered appropriately to questions, only complaint of back pain.  Patient's BP and SpO2 fluctuated ranging from 160s systolic to 50s systolic.  Her pulse on palpation varied from faint to strong and regular.  EKG was done which showed ST depressions in I, avL and V5/V6.   Given history of clots (PE, IVC and iliac veins of unknown etiology), strong suspicion for new clot burden.  Patient had IVC filter placed ~2 weeks ago because she failed anticoagulation due to GI bleed.    MICU consult was called for called for hypotension with history of multiple clots. Upon evaluation, point of care ultrasound of the heart confirmed clots in transit in the RA and RV, unclear whether in LA as well. Patient was transported immediately to the MICU.     PAST MEDICAL & SURGICAL HISTORY:  Acute gastric ulcer with hemorrhage (K25.0)  Essential hypertension (I10)  Type 2 diabetes mellitus without complication (E11.9)  HLD (hyperlipidemia) (E78.5)  H/O tubal ligation (Z98.51)    MEDICATIONS  (STANDING):  alteplase    IVPB 50 milliGRAM(s) IV Intermittent once  atorvastatin 40 milliGRAM(s) Oral at bedtime  cefTRIAXone   IVPB 2 Gram(s) IV Intermittent every 24 hours  cefTRIAXone   IVPB      chlorhexidine 4% Liquid 1 Application(s) Topical <User Schedule>  dextrose 5%. 1000 milliLiter(s) (50 mL/Hr) IV Continuous <Continuous>  dextrose 50% Injectable 12.5 Gram(s) IV Push once  dextrose 50% Injectable 25 Gram(s) IV Push once  dextrose 50% Injectable 25 Gram(s) IV Push once  heparin  Infusion.  Unit(s)/Hr (11 mL/Hr) IV Continuous <Continuous>  heparin  Injectable 5000 Unit(s) IV Push once  insulin lispro (HumaLOG) corrective regimen sliding scale   SubCutaneous three times a day before meals  insulin lispro (HumaLOG) corrective regimen sliding scale   SubCutaneous at bedtime  lidocaine   Patch 1 Patch Transdermal daily  pantoprazole  Injectable 40 milliGRAM(s) IV Push two times a day  sodium chloride 0.9%. 1000 milliLiter(s) (100 mL/Hr) IV Continuous <Continuous>    MEDICATIONS  (PRN):  acetaminophen   Tablet 650 milliGRAM(s) Oral every 6 hours PRN For Temp greater than 38 C (100.4 F)  acetaminophen   Tablet. 650 milliGRAM(s) Oral every 6 hours PRN Mild Pain (1 - 3)  dextrose Gel 1 Dose(s) Oral once PRN Blood Glucose LESS THAN 70 milliGRAM(s)/deciliter  glucagon  Injectable 1 milliGRAM(s) IntraMuscular once PRN Glucose LESS THAN 70 milligrams/deciliter  heparin  Injectable 5000 Unit(s) IV Push every 6 hours PRN For aPTT less than 40  heparin  Injectable 2500 Unit(s) IV Push every 6 hours PRN For aPTT between 40 - 57  ondansetron Injectable 4 milliGRAM(s) IV Push every 6 hours PRN Nausea and/or Vomiting    GENERAL: patient appears pale, anxious and uncomfortable  HEAD:  Atraumatic, Normocephalic  EYES: EOMI, PERRLA, conjunctiva and sclera clear  NECK: Supple, No JVD  CHEST/LUNG: tachypneic, increased work of breathing  HEART: Tachycardic; No murmurs, rubs, or gallops  ABDOMEN: Soft, Nontender, mildly distended  EXTREMITIES:  2+ Peripheral Pulses, No clubbing, cyanosis, or edema  PSYCH: AAOx3, however very anxious  NEUROLOGY: grossly intact, moving all extremities.    Assessment:  68 year old female with PMH multiple clots, GIB, HTN, T2DM transferred to the MICU for instability in the setting of current clot in the RA and RV, concerning for massive pulmonary embolism.    Neuro:  -Patient is confused, minimally responsive metabolic encephalopathy. Could be in the setting of massive PE vs stroke.   -Patient currently unstable to go to [facility]. Will obtain when patient stabilizes.    CV:  -Patient is tachycardic and hypotensive. Will avoid fluids as pt likely in obstructive shock.   -Would add pressors if MAP>65.     Pulmonary:  -Concerning for massive PE.   -Need to discuss with family the risks and benefits of tPA given patient's recent GIB s/p heparin.  -Will put patient on heparin currently as it is emergently indicated.    Renal:  -No issues currently    GI:  -Concerning for GIB if we were to give tPA. Need to discuss with family and NOK as pt currently encephalopathic.    Metabolic:  -Bicarb 14. Patient tachypneic; likely in respiratory acidosis with PE as the main concern.    Hematology:  -H/H stable at 8.   -Will trend    DVT prophylaxis:  -Pt with known DVTs, IVC clot, and intracardiac thrombi. S/p IVC filter. Concerning for PE. Start on heparin drip. Consider tpa.   -----    Malissa THOMAS PGY1  [id number]    Attending addendum    68 year old female with extensive clot burden s/p RRT for pre-syncopal episode and hypotension. POCUS showed clot in transit in RA and immediately transferred to MICU. Shortly after arrival to MICU, patient became bradycardic and had cardiac arrest. ACLS performed for ~20 minutes. Patient with multiple attempts at anticoagulation during admission with GI bleed. Given the extent of clot burden and cardiac arrest tPA was administered without ROSC. Pronounced at 11:18 AM. Family was notified. Autopsy declined.    Kimberley Cha MD MICU Acceptance Note:    History:  67 YO female was admitted to [facility] 4/5/18 with 2 weeks of back pain and fevers. Patient was unable to ambulate due to pain but had no focal weakness, no sensory loss, no bowel or bladder dysfunction. MRI of the lumbar spine revealed a left sided posterior epidural abscess extending from L1-L4. Her hospital course was complicated by an upper GI bleed requiring transfusions of 4U PRBC and 1U of platelets. Patient was evaluated by neurosurgery with no plan for neurosurgical intervention.  Patient was found to have bilateral DVTs, and had an IVC filter placed as she had bled previously. EGD was done, concerning for gastric mass. Patient underwent CTA, which demonstrated an acute PE within the posterior segmental basal artery of the left lower lobe. Patient was started on heparin.  Abdominal CT showed also psoas abscess with extensive DVT in b/l iliac and infrarenal IVC. Patient was also found to have Aggrigatebacter bacteremia. Was getting imepenem. Patient received a PICC line for long term antibiotics and was stable and getting ready to be transferred to rehab. On 4/22/18, a RRT called for syncope.  Per RN, patient was ambulating back from the bathroom with her daughter when she started to feel weak and collapsed to the ground, never lost consciousness.  She was helped to the bed and placed on monitor, showing tachycardic to 130s.  Rectal temp 99.2.  Initial BP was 70s systolic and 1L NS bolus under pressure back was started.  She was cold and diaphoretic.  Mental status always intact, patient followed commands and answered appropriately to questions, only complaint of back pain.  Patient's BP and SpO2 fluctuated ranging from 160s systolic to 50s systolic.  Her pulse on palpation varied from faint to strong and regular.  EKG was done which showed ST depressions in I, avL and V5/V6.   Given history of clots (PE, IVC and iliac veins of unknown etiology), strong suspicion for new clot burden.  Patient had IVC filter placed ~2 weeks ago because she failed anticoagulation due to GI bleed.    MICU consult was called for called for hypotension with history of multiple clots. Upon evaluation, point of care ultrasound of the heart confirmed clots in transit in the RA and RV, unclear whether in LA as well. Patient was transported immediately to the MICU.     PAST MEDICAL & SURGICAL HISTORY:  Acute gastric ulcer with hemorrhage (K25.0)  Essential hypertension (I10)  Type 2 diabetes mellitus without complication (E11.9)  HLD (hyperlipidemia) (E78.5)  H/O tubal ligation (Z98.51)    MEDICATIONS  (STANDING):  alteplase    IVPB 50 milliGRAM(s) IV Intermittent once  atorvastatin 40 milliGRAM(s) Oral at bedtime  cefTRIAXone   IVPB 2 Gram(s) IV Intermittent every 24 hours  cefTRIAXone   IVPB      chlorhexidine 4% Liquid 1 Application(s) Topical <User Schedule>  dextrose 5%. 1000 milliLiter(s) (50 mL/Hr) IV Continuous <Continuous>  dextrose 50% Injectable 12.5 Gram(s) IV Push once  dextrose 50% Injectable 25 Gram(s) IV Push once  dextrose 50% Injectable 25 Gram(s) IV Push once  heparin  Infusion.  Unit(s)/Hr (11 mL/Hr) IV Continuous <Continuous>  heparin  Injectable 5000 Unit(s) IV Push once  insulin lispro (HumaLOG) corrective regimen sliding scale   SubCutaneous three times a day before meals  insulin lispro (HumaLOG) corrective regimen sliding scale   SubCutaneous at bedtime  lidocaine   Patch 1 Patch Transdermal daily  pantoprazole  Injectable 40 milliGRAM(s) IV Push two times a day  sodium chloride 0.9%. 1000 milliLiter(s) (100 mL/Hr) IV Continuous <Continuous>    MEDICATIONS  (PRN):  acetaminophen   Tablet 650 milliGRAM(s) Oral every 6 hours PRN For Temp greater than 38 C (100.4 F)  acetaminophen   Tablet. 650 milliGRAM(s) Oral every 6 hours PRN Mild Pain (1 - 3)  dextrose Gel 1 Dose(s) Oral once PRN Blood Glucose LESS THAN 70 milliGRAM(s)/deciliter  glucagon  Injectable 1 milliGRAM(s) IntraMuscular once PRN Glucose LESS THAN 70 milligrams/deciliter  heparin  Injectable 5000 Unit(s) IV Push every 6 hours PRN For aPTT less than 40  heparin  Injectable 2500 Unit(s) IV Push every 6 hours PRN For aPTT between 40 - 57  ondansetron Injectable 4 milliGRAM(s) IV Push every 6 hours PRN Nausea and/or Vomiting    GENERAL: patient appears pale, anxious and uncomfortable  HEAD:  Atraumatic, Normocephalic  EYES: EOMI, PERRLA, conjunctiva and sclera clear  NECK: Supple, No JVD  CHEST/LUNG: tachypneic, increased work of breathing  HEART: Tachycardic; No murmurs, rubs, or gallops  ABDOMEN: Soft, Nontender, mildly distended  EXTREMITIES:  2+ Peripheral Pulses, No clubbing, cyanosis, or edema  PSYCH: AAOx3, however very anxious  NEUROLOGY: grossly intact, moving all extremities.    Assessment:  68 year old female with PMH multiple clots, GIB, HTN, T2DM transferred to the MICU for instability in the setting of current clot in the RA and RV, concerning for massive pulmonary embolism.    Neuro:  -Patient is confused, minimally responsive metabolic encephalopathy. Could be in the setting of massive PE vs stroke.   -Patient currently unstable to go to [facility]. Will obtain when patient stabilizes.    CV:  -Patient is tachycardic and hypotensive. Will avoid fluids as pt likely in obstructive shock.   -Would add pressors if MAP>65.     Pulmonary:  -Concerning for massive PE.   -Need to discuss with family the risks and benefits of tPA given patient's recent GIB s/p heparin.  -Will put patient on heparin currently as it is emergently indicated.    Renal:  -No issues currently    GI:  -Concerning for GIB if we were to give tPA. Need to discuss with family and NOK as pt currently encephalopathic.    Metabolic:  -Bicarb 14. Patient tachypneic; likely in respiratory acidosis with PE as the main concern.    Hematology:  -H/H stable at 8.   -Will trend    DVT prophylaxis:  -Pt with known DVTs, IVC clot, and intracardiac thrombi. S/p IVC filter. Concerning for PE. Start on heparin drip. Consider tpa.   -----    Malissa THOMAS PGY1  [id number]    Attending addendum    68 year old female with extensive clot burden s/p RRT for pre-syncopal episode and hypotension. POCUS showed clot in transit in RA and immediately transferred to MICU. Shortly after arrival to MICU, patient became bradycardic and had cardiac arrest. ACLS performed for ~20 minutes. Patient with multiple attempts at anticoagulation during admission with GI bleed. Given the extent of clot burden and cardiac arrest tPA was administered without ROSC. Pronounced at 11:18 AM. Family was notified. Autopsy declined.    Kimberley Cha MD  Critical Time spent: 45 minutes

## 2018-04-22 NOTE — PROGRESS NOTE ADULT - ASSESSMENT
Patient is a 69 yo woman with DM2, HTN, HLD and gastric ulcer initially admitted at [facility] on 4/5/18 for L1-L4 left sided posterior epidural abscess with discitis and osteomyelitis. Hospital course complicated by acute GIB ( s/p transfusion 4 u prbc and 1 u plts) and finding of infrarenal IVC thrombus and b/l common illiac thrombus s/p IVC filter. Patient subsequently transferred to [facility]. Patient found to have acute PE and started on heparin drip but developed melena and thus taken off AC. Currently hemodynamically stable Patient is a 69 yo woman with DM2, HTN, HLD and gastric ulcer initially admitted at [facility] on 4/5/18 for L1-L4 left sided posterior epidural abscess with discitis and osteomyelitis. Hospital course complicated by acute GIB ( s/p transfusion 4 u prbc and 1 u plts) presumed 2/2 bleeding gastric ulcers with findings of infrarenal IVC thrombus and b/l common illiac thrombi s/p IVC filter. Patient subsequently transferred to [facility]. Patient found to have acute PE and started on heparin drip on 4/12 but developed melena and thus taken off AC by 4/14. Patient now acute symptomatic hypotension with bedside US done by critical care team noted cardiac mass. High suspicion for acute cardiac strain from possible progression or addition PEs. Patient is a 69 yo woman with DM2, HTN, HLD and gastric ulcer initially admitted at [facility] on 4/5/18 for L1-L4 left sided posterior epidural abscess with discitis and osteomyelitis. Hospital course complicated by acute GIB ( s/p transfusion 4 u prbc and 1 u plts) presumed 2/2 bleeding gastric ulcers with findings of infrarenal IVC thrombus and b/l common illiac thrombi s/p IVC filter. Patient subsequently transferred to [facility]. Patient found to have acute PE and started on heparin drip on 4/12 but developed melena and thus taken off AC by 4/14. Patient now acute symptomatic hypotension with bedside US done by critical care team noted intracardiac thrombi. High suspicion for acute cardiac strain from possible progression or addition PEs.

## 2018-04-22 NOTE — PROGRESS NOTE ADULT - PROBLEM SELECTOR PLAN 3
H/H stable, no A/C, PO Protonix daily, avoid NSAIDs  Repeat EGD in 4 weeks also + bacteremia, Left Epidural L1-L4 and psoas abscess with osteomyelitis and discitis. Per IR attending, abscesses not in the location or large enough to be drained  CTX for 6 weeks from the negative blood cx from 4/8-5/20/18  Weekly ESR, CRP, CBC, CMP  4/6 blood cx with aggregatibacter, DERRICK neg for endocarditis but + PE, HIV negative,  continue lidocaine patch and Tylenol PRN  - continue ceftriaxone 2g IV q24 hours

## 2018-04-22 NOTE — CHART NOTE - NSCHARTNOTEFT_GEN_A_CORE
MAR RRT NOTE    RRT called for syncope.  Per RN, patient was ambulating back from the bathroom with her daughter when she started to feel weak and collapsed to the ground, never lost consciousness.  She was helped to the bed and placed on monitor, showing tachycardic to 130s.  Rectal temp 99.2.  Initial BP was 70s systolic and 1L NS bolus under pressure back was started.  She was cold and diaphoretic.  Mental status always intact, patient followed commands and answered appropriately to questions, only complaint of back pain.  Patient's BP and SpO2 fluctuated ranging from 160s systolic to 50s systolic.  Her pulse on palpation varied from faint to strong and regular.  EKG was done which showed ST depressions in I, avL and V5/V6.   Given history of clots (PE, IVC and iliac veins of unknown etiology), strong suspicion for new clot burden.  Patient had IVC filter placed ~2 weeks ago because she failed anticoagulation due to GI bleed.  MICU was called and arrived at bedside.  On POCUS, patient was noted to have both RA and LA thrombi.  She was accepted to MICU for further management.   Antibiotics were originally ordered due to concern for sepsis, however discontinued.  Labs were also checked during RRT- CBC, CMP, coags, Type and screen, VBG.       Lani Araujo MD  PGY-3, MAR-Day  spectra

## 2018-04-22 NOTE — DISCHARGE NOTE FOR THE EXPIRED PATIENT - HOSPITAL COURSE
69 YO female was admitted to [facility] 4/5/18 with 2 weeks of back pain and fevers. Patient was unable to ambulate due to pain but had no focal weakness, no sensory loss, no bowel or bladder dysfunction. MRI of the lumbar spine revealed a left sided posterior epidural abscess extending from L1-L4. Her hospital course was complicated by an upper GI bleed requiring transfusions of 4U PRBC and 1U of platelets. Patient was evaluated by neurosurgery with no plan for neurosurgical intervention.  Patient was found to have bilateral DVTs, and had an IVC filter placed as she had bled previously. EGD was done, concerning for gastric mass. Patient underwent CTA, which demonstrated an acute PE within the posterior segmental basal artery of the left lower lobe. Patient was started on heparin.  Abdominal CT showed also psoas abscess with extensive DVT in b/l iliac and infrarenal IVC. Patient was also found to have Aggrigatebacter bacteremia. Was getting imepenem. Patient received a PICC line for long term antibiotics and was stable and getting ready to be transferred to rehab. On 4/22/18, a RRT called for syncope.  Per RN, patient was ambulating back from the bathroom with her daughter when she started to feel weak and collapsed to the ground, never lost consciousness.  She was helped to the bed and placed on monitor, showing tachycardic to 130s.  Rectal temp 99.2.  Initial BP was 70s systolic and 1L NS bolus under pressure back was started.  She was cold and diaphoretic.  Mental status always intact, patient followed commands and answered appropriately to questions, only complaint of back pain.  Patient's BP and SpO2 fluctuated ranging from 160s systolic to 50s systolic.  Her pulse on palpation varied from faint to strong and regular.  EKG was done which showed ST depressions in I, avL and V5/V6.   Given history of clots (PE, IVC and iliac veins of unknown etiology), strong suspicion for new clot burden.  Patient had IVC filter placed ~2 weeks ago because she failed anticoagulation due to GI bleed.  MICU was called and arrived at bedside.  On POCUS, patient was noted to have both RA and LA thrombi.  She was accepted to MICU for further management.   Antibiotics were originally ordered due to concern for sepsis, however discontinued.  Labs were also checked during RRT- CBC, CMP, coags, Type and screen, VBG. She was transferred to the MICU. Upon arrival, patient became unresponsive for several minutese and a pulse was lost for several seconds, but she came back on her own. While she was being attached to monitors, she again became unresponsive, her eyes rolled back, and her heart rate slowed. She was given atropine x 1, and chest compressions were started when pulse went below 20. She was bagged. Her rhythm upon rhythm check was asystole. She was given multiple rounds of epinephrine with continuous CPR and bagging. Patient never achieved ROSC. Patient had no spontaneous respirations, heart sounds, response to any stimulus including noxious stimuli. Patient's pupil were fixed and dilated at 8mm and with no response to light, no corneal reflexes, no gag reflex, and no oculocephalic reflex. Patient was pronounced at 11:18am. Patient's family was in the waiting room and were informed. 67 YO female was admitted to [facility] 4/5/18 with 2 weeks of back pain and fevers. Patient was unable to ambulate due to pain but had no focal weakness, no sensory loss, no bowel or bladder dysfunction. MRI of the lumbar spine revealed a left sided posterior epidural abscess extending from L1-L4. Her hospital course was complicated by an upper GI bleed requiring transfusions of 4U PRBC and 1U of platelets. Patient was evaluated by neurosurgery with no plan for neurosurgical intervention.  Patient was found to have bilateral DVTs, and had an IVC filter placed as she had bled previously. EGD was done, concerning for gastric mass. Patient underwent CTA, which demonstrated an acute PE within the posterior segmental basal artery of the left lower lobe. Patient was started on heparin.  Abdominal CT showed also psoas abscess with extensive DVT in b/l iliac and infrarenal IVC. Patient was also found to have Aggrigatebacter bacteremia. Was getting imepenem. Patient received a PICC line for long term antibiotics and was stable and getting ready to be transferred to rehab. On 4/22/18, a RRT called for syncope.  Per RN, patient was ambulating back from the bathroom with her daughter when she started to feel weak and collapsed to the ground, never lost consciousness.  She was helped to the bed and placed on monitor, showing tachycardic to 130s.  Rectal temp 99.2.  Initial BP was 70s systolic and 1L NS bolus under pressure back was started.  She was cold and diaphoretic.  Mental status always intact, patient followed commands and answered appropriately to questions, only complaint of back pain.  Patient's BP and SpO2 fluctuated ranging from 160s systolic to 50s systolic.  Her pulse on palpation varied from faint to strong and regular.  EKG was done which showed ST depressions in I, avL and V5/V6.   Given history of clots (PE, IVC and iliac veins of unknown etiology), strong suspicion for new clot burden.  Patient had IVC filter placed ~2 weeks ago because she failed anticoagulation due to GI bleed.  MICU was called and arrived at bedside.  On POCUS, patient was noted to have RA clot in transit.  She was accepted to MICU for further management for initiation of anticoagulation in a monitored setting.   Antibiotics were originally ordered due to concern for sepsis, however discontinued.  Labs were also checked during RRT- CBC, CMP, coags, Type and screen, VBG. She was transferred to the MICU. Upon arrival, patient became unresponsive for several minutes and a pulse was lost for several seconds, but she came back on her own. While she was being attached to monitors, she again became unresponsive, her eyes rolled back, and her heart rate slowed. She was given atropine x 1, and chest compressions were started when pulse went below 20 and BVM initiated. Her rhythm upon rhythm check was asystole. ACLS continued and given multiple rounds of epinephrine with continuous CPR. tPA was also given for massive PE. Patient never achieved ROSC. Patient had no spontaneous respirations, heart sounds, response to any stimulus including noxious stimuli. Patient's pupil were fixed and dilated at 8mm and with no response to light, no corneal reflexes, no gag reflex, and no oculocephalic reflex. Patient was pronounced at 11:18am. Patient's family was in the waiting room and were informed. Autopsy declined.

## 2018-04-22 NOTE — PROGRESS NOTE ADULT - PROBLEM SELECTOR PLAN 1
EGD done 4/11 with Non-bleeding gastric ulcer with a clean ulcer base and one non-bleeding duodenal ulcer with a clean ulcer  base (Faraz Class III) which was benign appearing. gastric bx negative for H. pylori and dysplasia  - continue pantoprazole 40mg daily  - HOLD carafate 1mg QID for now - may be contributing to nausea.  - trial of Maalox PRN Patient with known cardiac mass seen on 4/12 DERRICK and also with acute PE but not on AC due to hx of GI bleeding. Oxygenating well on supplemental O2. Still symptomatic. BP responsive to IV normal saline.  Reviewed EKG - ST depressions noted in leads I  - transfer to MICU for close monitoring  - cardiac monitoring  - supplemental O2 as needed  - continue IV fluids  - Zofran PRN for nausea Spoke with Dr. Carrasco today and reviewed her chart note from 4/12. Patient with cardiac mass seen on 4/12 DERRICK, which was not seen on subsequent CTA and also with acute PE but not on AC due to hx of GI bleeding when started on heparin gtt. Oxygenating well on supplemental O2. Still symptomatic. BP responsive to IV normal saline.  Reviewed EKG - ST depressions noted in leads I, aVL  - transfer to MICU for close monitoring  - cardiac monitoring  - supplemental O2 as needed  - continue IV fluids  - Zofran PRN for nausea  - continued care as per MICU team

## 2018-04-22 NOTE — PROGRESS NOTE ADULT - PROBLEM SELECTOR PLAN 2
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 Per IR attending, abscesses not in the location or large enough to be drained  CTX for 6 weeks from the negative blood cx from 4/8-5/20/18  Weekly ESR, CRP, CBC, CMP  4/6 blood cx with aggregatibacter, DERRICK neg for endocarditis but + PE, HIV negative,  continue lidocaine patch and Tylenol PRN  - continue ceftriaxone 2g IV q24 hours EGD done 4/11 with Non-bleeding gastric ulcer with a clean ulcer base and one non-bleeding duodenal ulcer with a clean ulcer  base (Faraz Class III) which was benign appearing. gastric bx negative for H. pylori and dysplasia  - continue pantoprazole 40mg daily  - HOLD carafate 1mg QID for now - may be contributing to nausea.  - trial of Maalox PRN

## 2018-04-22 NOTE — CHART NOTE - NSCHARTNOTEFT_GEN_A_CORE
Notified by RN that pt is s/p fall 2/2 syncope. Pt was ambulating to the bathroom with family member when she became weak and dizzy. Per family member pt slid to the ground, did not hit head and no LOC. Pt safely transitioned back to bed where she was evaluated. Pt reported feeling dizzy and c/o back pain. Denied chest pain, shortness of breath, headache, abdominal pain. Pt A&Ox3, following commands throughout. Pt noted to be pale, diaphoretic and extremities cool to touch. HR tachycardic, Lungs decreased BS at bases, Abdomen soft NT/ND, Extremities without edema. Cardiac monitor tachycardic 130s, EKG with ST depression in I, avL and V5/V6. BP was 70s/30s, 1L NS bolus given. Pt's vitals continuously fluctuating throughout. MICU consulted- POCUS with RA and LA thrombi. Pt transferred to MICU for furter management/monitoring. Per chart note while in MICU, pt became unresponsive, HR slowed and then asystole noted - medications administered and CPR initiated, however pt never achieved ROSC. Per note, patient was pronounced at 11:18am.

## 2018-04-23 LAB — SPECIMEN SOURCE: SIGNIFICANT CHANGE UP

## 2018-04-27 LAB — BACTERIA BLD CULT: SIGNIFICANT CHANGE UP

## 2018-04-28 ENCOUNTER — APPOINTMENT (OUTPATIENT)
Dept: FAMILY MEDICINE | Facility: HOSPITAL | Age: 68
End: 2018-04-28

## 2018-05-08 LAB — BRUCELLA IGG+IGM PNL SER: SIGNIFICANT CHANGE UP

## 2018-05-20 LAB — MYCOBACTERIUM BLD CULT: SIGNIFICANT CHANGE UP

## 2021-12-20 NOTE — ED PROVIDER NOTE - AGGRAVATING FACTORS
Mattie Mccarthy MD  P [employer] [facility]s Amg Nurse Msg Pool  needs hematology consult     Component      Latest Ref Rng & Units 12/17/2021   Lupus Anticoagulant Pathologist Interpretation       Test results are negative for a circulating Lupus Anticoagulant (LA). The DRVV and DRV1 tests are elevated: however, other related tests are normal.  Repeat testing is recommended, if clinically indicated. PTT Lupus Anticoagulant      22.0 - 30.0 sec 27.0   DRVVT      <46.0 sec 57.6 (H)   DRV1      <46.0 sec 49.7 (H)   DRVC      <1.20 Ratio 1.16   Dilute Tra Viper Venom Test, Confirm Ratio 1:1 Mix      Ratio 1.12   Hexagonal Phospholipid Neutralization      <8.0 Delta sec 1.0   THROMBIN TIME      15.3 - 21.1 sec 16.3   PROTEIN C ACTIVITY      74 - 116 % 106   PROTEIN S ACTIVITY      60 - 110 % 144 (H)   ANTITHROMBIN III ACTIVITY      80 - 124 % 92   FACTOR 5 ASSAY      50 - 150 % 135   HOMOCYSTEINE      mcmol/L 13.6     Voicemail left for patient to return call for results, office phone number provided for patient callback. Service to hematology order entered into [employer]. none

## 2022-03-22 NOTE — ED PROVIDER NOTE - RESPIRATORY NEGATIVE STATEMENT, MLM
How Many Skin Cancers Have You Had?: more than one What Is The Reason For Today's Visit?: History of Non-Melanoma Skin Cancer When Was Your Last Cancer Diagnosed?: 2017 no chest pain, no cough, and no shortness of breath.

## 2022-06-16 NOTE — PRE-OP CHECKLIST - AICD PRESENT
no Statement Selected Implemented All Universal Safety Interventions:  [place] to call system. Call bell, personal items and telephone within reach. Instruct patient to call for assistance. Room bathroom lighting operational. Non-slip footwear when patient is off stretcher. Physically safe environment: no spills, clutter or unnecessary equipment. Stretcher in lowest position, wheels locked, appropriate side rails in place.

## 2022-10-26 NOTE — H&P ADULT - PROBLEM SELECTOR PROBLEM 3
Detail Level: Zone
Photo Preface (Leave Blank If You Do Not Want): Photographs were obtained today
Type 2 diabetes mellitus without complication

## 2024-03-12 NOTE — ED PROVIDER NOTE - RESPIRATORY, MLM
"Daily Note     Today's date: 3/12/2024  Patient name: Randi Mathews  : 1963  MRN: [id number]  Referring provider: Betzaida Brown DPM  Dx:   Encounter Diagnosis     ICD-10-CM    1. Pain of right heel  M79.671       2. Plantar fasciitis of right foot  M72.2       3. Muscle tear  T14.8XXA       4. Tarsal tunnel syndrome of right side  G57.51       5. Nerve entrapment  G58.9       6. Acquired right hindfoot varus  M21.171       7. Metatarsus adductus  Q66.229                      Subjective: Pt reports she has been stretching a lot more and has less pain. Reports some discomfort along med aspect of heel and achilles.       Objective: See treatment diary below      Assessment: Tolerated treatment well. Patient exhibited good technique with therapeutic exercises. Pt challenged with SLS balance. Cont to make gains with improved overall sx today.       Plan: Continue per plan of care.      Precautions: None at this time    Daily Treatment Diary    HEP: Handout provided and discussed      Manuals 2/26/24 3/7/24 3/12 2/7 2/21/24   ART R Foot/Heel/Ankle  x15'      PROM/Stretch 5'   5' 5'   IASTM  x5'      STM/Triggerpoint 10'  10' 10' 10'   JM                Neuro Re-Ed        Standing Calf/Achilles Stretch 20\" 6x ea w/towel roll 6x20'' ea 6x20'' ea \" ea towel roll 20\" 6x ea w/towel roll   Calf Stretch with Strap        No shoe AE Steamboats On airex 2x10 3x10 3/10 This session 2x10 on airex   Timed SLS AE No Shoe 20\" 8x 8x20''  \"    SL Ecc HR 10x 10x 2/10 This session 10x   Ankle Pumps        Timed SLS Bosu  6x20'' \"  This session 10\" 10x   Upside down BOSU squat holds        4-way wooden BAPS Board 2x10 ea 2x10 ea 2/10 ea This session 2x10 ea   Thera-stick roll x5' X5'  5' x5'                                   Ther Ex        Ankle  Circles        Lateral Walk        4-way wooden BAPS Board        HR/TR 3x10 ea 3x10  3/10 3x10   Seated HR/TR        Seated Ankle inv/ev 10x ea       Seated Ankle sup/pron     "    Toe flx/ext curls towel                Ther Activity        Bike/NuStep        Treadmill  X10' incline walk 10' incline walk     Forward/backward heel to toe Walk                Gait Training                        Modalities        MHP 7' with ankle in DF at wall increased pain medial of achilles   10' with ankle in DF at wall 10' with ankle in DF at wall   CP CP to R plantar of foot x 10 min Ice Massage x10' 5' ice massage  Ice w/water    US/Stim                     Breath sounds clear and equal bilaterally.

## 2024-07-15 NOTE — PROGRESS NOTE ADULT - PROBLEM SELECTOR PLAN 3
Amulet deployed into the lobe of the appendage.  on protonix infusion and holding heparin - GI bleed